# Patient Record
Sex: MALE | Race: BLACK OR AFRICAN AMERICAN | NOT HISPANIC OR LATINO | URBAN - METROPOLITAN AREA
[De-identification: names, ages, dates, MRNs, and addresses within clinical notes are randomized per-mention and may not be internally consistent; named-entity substitution may affect disease eponyms.]

---

## 2017-10-19 ENCOUNTER — HOSPITAL ENCOUNTER (OUTPATIENT)
Dept: INTENSIVE CARE | Facility: HOSPITAL | Age: 77
End: 2017-10-20
Attending: INTERNAL MEDICINE | Admitting: INTERNAL MEDICINE

## 2022-04-29 NOTE — DISCHARGE SUMMARY
DISCHARGE DATE:  10/20/2017    FINAL DIAGNOSES:    1. Atypical chest pain.  2. Hypertension.  3. Hypercholesterolemia.  4. Anemia.  5. Diabetes mellitus type 2.  6. Thrombocytopenia.    HOSPITAL COURSE:  Mr. Morgan is a 76-year-old  male who was admitted because of chest pain lasting about approximately one-half an hour off and on.  At this point, we did troponin levels on the patient, which has been basically negative on two occasions.  The EKG revealed no acute changes and we consulted cardiology.  The cardiologist saw the patient and thinks that he can work with the patient as an outpatient to do a nuclear stress test as an outpatient.  Since in the hospital, he has been fairly stable.  He is not complaining of any chest pain.  He seems to have responded to all the treatment, cooperative and patient, at this point in time, is ready to be discharged.    FINAL DISPOSITION:  Discharge to home in satisfactory physical condition with the following recommendation to see me in my office in two weeks and to follow up also with the cardiologist.        ______________________________  MD ROBYN Paz/PL  DD:  10/20/2017  Time:  04:42PM  DT:  10/21/2017  Time:  11:06AM  Job #:  [id number]

## 2022-04-29 NOTE — CONSULTS
DATE OF CONSULTATION:  10/19/2017    ATTENDING PHYSICIAN:  Dr. Abdulaziz Almazan MD  CONSULTING PHYSICIAN:  Emile Hopkins MD    REASON FOR CONSULTATION:  This consultation was requested by Dr. Almazan on 10/19/17.  The patient was seen and examined by me on 10/19/17.  Reason for consultation is evaluation of chest pain and shortness of breath for cardiovascular evaluation and management.    HISTORY OF PRESENT ILLNESS:  The patient is a very pleasant 76-year-old male patient well known to me with the following past medical history:  1. Hypertension, hyperlipidemia, diabetes mellitus and mildly elevated body mass index.  2. History of coronary artery disease.  The patient has had previous stents and also has had some symptoms suggestive of spasms in the past.  He also has very significant small vessel disease.  Last heart catheterization was 6/16/16.  At that time, he had diffuse small vessel in the mid and distal LAD, which was 1 mm in the mid LAD and 0.5 mm in the distal LAD.  This was to be treated medically. The patient is on high dose ______, as well as Ranexa.  At that time, the other arteries were okay.  There was 30 to 40% in the diagonal and 30% in small high OM and small OM2, and in the distal circumflex artery and in 30 to 40% in the right coronary artery, PD with small vessel disease.  3. Hypertensive heart disease with left ventricular hypertrophy and left ventricular diastolic dysfunction.  4. Moderate severe left ventricular hypertrophy, diastolic dysfunction and he has moderate left atrial enlargement.  He is on hydrochlorothiazide as part of lisinopril/hydrochlorothiazide, which his used also has a diuretic to help with shortness of breath.  He may have a mild component of mild chronic diastolic heart failure.  5. History of valvular heart disease with mild to moderate mitral regurgitation, mild tricuspid regurgitation and mild to moderate pulmonary hypertension.    6. History of iron deficiency  anemia.  The patient has had previous admissions where iron level was as low as 8.  Today, it is around 12, which is still mildly anemic, but much better than previous admissions.  The patient does receive IV iron by the hematology service and is due for an effusion soon.  7. History of gastritis.  8. History of obstructive sleep apnea.  The patient uses a CPAP.  9. History of slow GI bleed.  The patient needed 4 units of blood for hemoglobin of 7 in 11/16.    10. History of appendectomy, cataract extraction OU, colonoscopy, _____ stents as detailed.     Mr. Morgan has the history as detailed above, who presented to the hospital because of chest discomfort, as well as shortness of breath.  He has noted that around two to three weeks, the patient had ______ which she takes daily, and Effient which she takes two days a week for about one week to undergo teeth extraction for some bad teeth.  He got back on the medication after that.  The patient does report the discomfort is in the mid chest area, it is associated with some shortness of breath.  No nausea, vomiting or sweating.  These episodes have been intermittent.  They had occurred with exertion, as well as at rest at times.  Cardiac enzymes have so far been negative.  Alkaline phosphatase was mildly elevated, which is higher than all previous admission.  The patient does report he still has is gallbladder.  He does not know of any history of gallstones.  Amylase and lipase were okay.  The patient does not report any specific relationship with this discomfort with eating.  The patient does admit that he feels weak when he stands and walks.  He feels like his legs become like jelly and he feels weakness in the legs and some discomfort in the legs and feels like falling.  Blood pressure has been relatively in the low normal side lately.  I feel that it may have been too low and maybe he is getting some orthostasis.  I will decrease some of his medications.  Some of  his symptoms seem to be suggestive of this.       I discussed with him the different options regarding his coronary artery disease in this pattern of symptoms.  We discussed if does show any evidence of _____, such has _____ cardiac enzymes or ongoing dynamic changes in EKG or ongoing symptoms, we may consider an angiogram. However, if no high risk features develop, we may adjust his medications and consider doing some outpatient ischemia evaluation.  Last stress test was done two years ago, and his last angiogram was around 1-1/2 years ago.  The patient, however, given his alkaline phosphatase will check him out also for some gallbladder pathology with right upper quadrant ultrasound.  Given his orthostatic symptoms, I am going to discontinue hydralazine and discontinue Diltiazem as the isosorbide dose is quite high and should sufficing to take care of his spasms.  I will change Coreg from 3.5 to 5 b.i.d. to three times a day given his extensive small vessel disease.  I will also check D-dimer, and if positive will obtain CTA of chest and venous upper and lower extremities.    PAST MEDICAL HISTORY:  As above.    PAST SURGICAL HISTORY:  As above.    REVIEW OF SYSTEMS:  Complete comprehensive 12-system review of systems was obtained.  It is all negative, except for what is mentioned below and what is mentioned above in history of present illness.  I am not listing all of those 12 systems as I obtained comprehensively and listing some of them here.   GENERAL:   Generalized weakness and fatigue.   PULMONARY:  No cough or pleuritic chest pain.   CARDIOVASCULAR:  Dyspnea on exertion and chest discomfort, weakness in legs with some discomfort in legs with walker.   GI:  No hematochezia, melena or hematemesis.  History of GI bleeding.  History of chronic slow oozing with anemia and receives IV iron.   :  No dysuria, frequency or urgency.   MUSCULOSKELETAL:  Nonspecific aches and pains.    SOCIAL HISTORY:  Denies any  alcohol use.  Denies any tobacco use.  He is  with four children.  He lives in [place].  He is retired.  He used to work at the [employer].    FAMILY HISTORY:  Positive for coronary artery disease in father and mother.    MEDICATIONS:  Reviewed. I performed his MedRec for all medications since I was the first physician to come and see him to make sure he gets his medications.    ALLERGIES:  REVIEWED.    PHYSICAL EXAMINATION:  VITAL SIGNS:  Pulse 50s, respirations 18, pulse ox 96% on room air, blood pressure 138/83.  Despite the fact that he has not taken his medications since morning, blood pressure was only 144/85, thus supporting the possibility with may have dropped blood pressure too low contributing to orthostasis.     GENERAL:   Well-developed, well-nourished, in no acute distress.     HEENT:   Extraocular movements are intact.     NECK:  Supple.  No lymphadenopathy.  No appreciable jugular venous distention.  soft carotid bruit.   LUNGS:  Clear to auscultation bilaterally.   HEART:  Regular rate and rhythm.  No rubs.  Soft S4.  He has a holosystolic murmur, grade 1 to 2/6 at the apex on lateral sternal border.   ABDOMEN:  Soft, nontender and nondistended with normal active bowel sounds.  No Hooper's sign.   EXTREMITIES:  No clubbing, cyanosis or edema. Mildly decreased pulses in feet.    LABORATORY DATA:  Sodium 137, potassium 4.5, chloride 104, CO2 22, BUN 16, creatinine 1.2, glucose 199.  Alkaline phosphatase is elevated at 184.  On previous admission, 56, 62 and 69, so this is definitely a sudden increase.  I do not see any D-dimer ordered.  We will order that.  I do not see any BNP ordered, I will order that.  Lipase was 277, within normal limits.  Three sets of cardiac enzymes are so far negative.  White blood cell count 3.0, hemoglobin mildly decreased at 12.2, although on previous admissions, he was 10.5 and one time down to 6.7, platelet count 260.       Urinalysis was no suggestive of a  urinary tract infection.    ASSESSMENT/PLAN:    1. Chest pain appears to be multifactorial, partly could be angina.  We need to also rule out the possibility of pulmonary embolism.  We will check a screening D-dimer.  It could be related to his gallbladder given his alkaline phosphatase.  We will rule him out for myocardial infarction with serial EKG and enzymes.  I told the patient if any high risk features developed, then we will consider an angiogram.  However, if the cardiac enzymes are negative, and he continues to be chest pain free and no features develop on EKG, then we will proceed with adjustment with the medications and with outpatient ischemia evaluation.  This was the patient's wishes and we will respect that.  In the meantime, we will adjust his medications given his orthostasis.  I will go ahead and discontinue Diltiazem as the isosorbide should be taking care of his possibility of coronary vasospasm.  I will increase Coreg to 3.25 three times a day to take care of his significant small vessel disease in the mid and distal LAD.  I feel that she can better control with exertion of symptoms. I am not afraid of it causing any spasm.  It was just suspected because of his recurrent symptoms, although those could be coming from small vessel disease as well.  Again the possibility of him coming off of aspirin and Effient for one week to undergo his teeth extraction, may have ______ some pathology as well in the coronary arteries.  However, there is no evidence of any acute coronary syndrome at this stage yet.  2. Angina pectoris, as detailed above.  3. Elevated alkaline phosphatase, as detailed above.  Will obtain right upper quadrant ultrasound to make sure there is no gallbladder pathology.  This is a new findings in the labs for the patient.  4. Will check D-dimer and if it is positive, will obtain CTA of chest and venous Doppler of lower extremities.  5. Orthostasis.  I will discontinue hydralazine.  I  will discontinue Diltiazem.  I will change Coreg from 3.1 to 5 b.i.d. to three times a day.  Will monitor blood pressure and check on systolic in the morning.  I will discontinue lisinopril hydrochlorothiazide and will put him on lisinopril 5 mg, and instead of the hydrochlorothiazide for diuresis, we will use Lasix 10 mg daily.  That will achieve the amount of diuresis needed without dropping his blood pressure further.  6. History of hypertensive heart disease with left ventricular hypertrophy and diastolic dysfunction, possibility of a history of mild chronic diastolic heart failure whereby he had been on hydrochlorothiazide to achieve some amount of diuresis to prevent swelling in the legs and shortness of breath.  Will check BMP.  Will use Lasix instead of hydrochlorothiazide to avoid dropping blood pressure.  7. Valvular heart disease with mitral regurgitation, tricuspid regurgitation and pulmonary hypertension.    8. Mild anemia, much better than previous admission.  The patient is on chronic IV iron, he has slow GI bleed.  However, nothing to do interventionally, that is why he is on the chronic iron.  Effient has been decreased to two days a week because of that.  9. Diabetes, I will cover the patient with insulin sliding scale.  10.   Hypertension.  Will adjust medications as detailed above.  11. Hyperlipidemia.  Will resume the patient's statin.        ______________________________  MD YUDITH Mazariegos/SIMEON  DD:  10/19/2017  Time:  08:29PM  DT:  10/20/2017  Time:  07:19AM  Job #:  [id number]    cc: Dr. Abdulaziz Almazan MD

## 2022-04-29 NOTE — ED PROVIDER NOTES
"   Patient:   Alex Morgan             MRN: [id number]            FIN: [id number]               Age:   76 years     Sex:  Male     :  1940   Associated Diagnoses:   Acute chest pain; History of myocardial infarction   Author:   Malena Phelps MD      Basic Information   Time seen: Date & time 10/19/2017 09:48:00.   History source: Patient.   History limitation: None.   Additional information: Patient's physician(s): Sandeep PERERA, Emile JUÁREZ, Tha PERERA, Abdulaziz SENIOR, Chief Complaint from Nursing Triage Note : Chief Complaint   10/19/2017 9:43 CDT      Chief Complaint           pt c/o SOB and midline CP that started at 0900; reports pain is "tight", took 3 nitro PTA, CP intermettent x days with +weakness; hx of stents, MI, anemia; +effient  , Room #29.      History of Present Illness   The patient presents with 75 yo M who presents with CC of chest pain, pt took nitro.  Hx of anemia.  A michael pac  and       I, Dr. Phelps, assumed care for this patient at 1133.    76 year old black male with a hx of DM, HLD, and anemia presents to the ED complaining of intermittent centralized chest tightness with associated weakness and SOB onset 1 week ago. Patient states he was at Dr. Almazan' office at 0850 this morning when his symptoms began to worsen and he was told to come to the ED. He reports taking 3 NTG this morning and an ASA when he arrived at the ED and is currently pain free. He does not recall the date of his last angiogram. Patient denies n/v or diaphoresis.  The onset was 1  weeks ago.  The course/duration of symptoms is fluctuating in intensity.  Location: Central chest. Radiating pain: none. The character of symptoms is tightness.  The degree at onset was moderate.  The degree at maximum was moderate.  The degree at present is none.  The exacerbating factor is none.  The relieving factor is nitroglycerin.  Risk factors consist of diabetes mellitus and hyperlipidemia.  Prior episodes: none.  Therapy today " Nitroglycerin and Aspirin.  Associated symptoms: shortness of breath, denies nausea, denies vomiting and denies diaphoresis.        Review of Systems   Constitutional symptoms:  Weakness, no fever, no chills.    Skin symptoms:  No jaundice, no rash.    Eye symptoms:  Vision unchanged, No blurred vision,    Respiratory symptoms:  Shortness of breath, no orthopnea, no cough, no sputum production.    Cardiovascular symptoms:  Chest pain, central, moderate pain, tightness, no palpitations, no diaphoresis, no peripheral edema.    Gastrointestinal symptoms:  No abdominal pain, no nausea, no vomiting, no diarrhea, no constipation.    Genitourinary symptoms:  No dysuria, no hematuria.    Neurologic symptoms:  No headache, no dizziness.    Hematologic/Lymphatic symptoms:  Bleeding tendency negative,    Allergy/immunologic symptoms:  No impaired immunity,              Additional review of systems information: All other systems reviewed and otherwise negative.      Health Status   Allergies:    Allergic Reactions (Selected)  No Known Allergies.   Medications:  (Selected)   Prescriptions  Prescribed  Imdur 120 mg oral tablet, extended release: 120 mg = 1 tab(s), Oral, At Bedtime, # 30 tab(s), 6 Refill(s)  ferrous sulfate 220 mg/5 mL (44 mg elemental iron) oral elixir: 220 mg = 5 mL, Oral, Daily, # 480 mL, 2 Refill(s), Pharmacy: [facility]  Documented Medications  Documented  B-12 Resin 1000 mcg oral tablet: 1,000 mcg = 1 tab(s), Oral, Daily, # 90 tab(s), 0 Refill(s)  COMBIGAN SOL 0.2/0.5%: 1 drop(s), Eye-Both, q12hr  Effient 10 mg oral tablet: See Instructions, 1 tab(s) Oral Daily, 0 Refill(s)  GLIPIZIDE TAB 5MG: 10 mg = 2 tab(s), Oral, BID  MAGNESIUM OXIDE 400 MG TABLET: 400 mg, 1 tab(s) Oral bid  NITROSTAT SUB 0.4M.4 mg = 1 tab(s), SL, q5min  PANTOPRAZOLE SOD DR 40 MG TAB: 40 mg = 1 tab(s), Oral, Daily  RANITIDINE TAB 150M mg = 1 tab(s), Oral, BID  Ranexa 1000 mg oral tablet, extended release: 1,000 mg = 1  tab(s), Oral, BID, # 60 tab(s), 0 Refill(s)  SIMVASTATIN TAB 40M mg = 1 tab(s), Oral, Once a day (at bedtime)  carvedilol 3.125 mg oral tablet: 3.125 mg = 1 tab(s), Oral, BID, # 180 tab(s), 0 Refill(s)  diltiazem 120 mg/24 hours oral CAPsule extended release: 120 mg = 1 cap(s), Oral, Daily, # 30 cap(s), 0 Refill(s)  hydrALAZINE 10 mg oral tablet: 10 mg = 1 tab(s), Oral, TID, # 90 tab(s), 0 Refill(s)  hydrochlorothiazide-lisinopril 12.5 mg-10 mg oral tablet: 1/2 tab(s), Oral, Daily, 0 Refill(s).      Past Medical/ Family/ Social History   Medical history:    Active  Anemia ([id number])  Diabetes mellitus type II (12384373)  Hyperlipidemia (94584874)  Resolved  Angina (721816861):  Resolved.  Cataracts (5110821679):  Resolved.  CAD - Coronary artery disease (4906603643):  Resolved.  HTN (hypertension) ([id number]):  Resolved.  Heartburn ([id number]):  Resolved.  History of stomach ulcers ([id number]):  Resolved..   Surgical history:    stent placement.  appenedectomy.  Angiogram (600524370)..   Family history:    Hypertension  Father  Diabetes mellitus type 2  Father  Brother  Sister  CHF (congestive heart failure)...  Father  Pacemaker...  Mother  .      Physical Examination               Vital Signs   Vital Signs   10/19/2017 11:33 CDT     Peripheral Pulse Rate     55 bpm  LOW                             Heart Rate Monitored      55 bpm  LOW                             Respiratory Rate          23 br/min                             SpO2                      98 %                             Systolic Blood Pressure   146 mmHg  HI                             Diastolic Blood Pressure  79 mmHg                             Mean Arterial Pressure, Cuff              101 mmHg    10/19/2017 11:01 CDT     Peripheral Pulse Rate     56 bpm  LOW                             Heart Rate Monitored      55 bpm  LOW                             Respiratory  Rate          16 br/min                             SpO2                      99 %                             Oxygen Therapy            Room air                             Systolic Blood Pressure   136 mmHg                             Diastolic Blood Pressure  80 mmHg                             Mean Arterial Pressure, Cuff              99 mmHg    10/19/2017 10:15 CDT     Peripheral Pulse Rate     55 bpm  LOW                             Heart Rate Monitored      55 bpm  LOW                             Respiratory Rate          19 br/min                             SpO2                      100 %                             Oxygen Therapy            Room air                             Systolic Blood Pressure   152 mmHg  HI                             Diastolic Blood Pressure  79 mmHg                             Mean Arterial Pressure, Cuff              103 mmHg    10/19/2017 9:43 CDT      Temperature Oral          36.4 DegC                             Peripheral Pulse Rate     54 bpm  LOW                             Respiratory Rate          18 br/min                             SpO2                      96 %                             Oxygen Therapy            Room air                             Systolic Blood Pressure   138 mmHg                             Diastolic Blood Pressure  83 mmHg  .   Measurements   10/19/2017 9:43 CDT      Weight Dosing             88.5 kg                             Weight Measured and Calculated in Lbs     195.11 lb                             Weight Estimated          88.5 kg                             Height/Length Dosing      173 cm                             Height/Length Estimated   173 cm                             Body Mass Index Estimated 29.57 kg/m2  .   Basic Oxygen Information   10/19/2017 11:33 CDT     SpO2                      98 %    10/19/2017 11:01 CDT     SpO2                      99 %                             Oxygen Therapy            Room air    10/19/2017  10:15 CDT     SpO2                      100 %                             Oxygen Therapy            Room air    10/19/2017 9:43 CDT      SpO2                      96 %                             Oxygen Therapy            Room air  .   General:  Alert, no acute distress.    Skin:  Warm, dry.    Head:  Normocephalic, atraumatic.    Neck:  Supple, trachea midline.    Eye:  Normal conjunctiva.   Ears, nose, mouth and throat:  Oral mucosa moist.   Cardiovascular:  Regular rate and rhythm, No murmur, Normal peripheral perfusion, No edema.    Respiratory:  Lungs are clear to auscultation, respirations are non-labored.    Gastrointestinal:  Soft, Nontender, Non distended, Normal bowel sounds.    Neurological:  Alert and oriented to person, place, time, and situation.   Psychiatric:  Cooperative.      Medical Decision Making   Documents reviewed:  Emergency department nurses' notes.   Orders  Laboratory    CBC w/ Auto Diff, Phuong Albarran PA-C, 10/19/17, 09:49, Completed    CMP, Phuong Albarran PA-C, 10/19/17, 09:49, Completed    Urinalysis Complete a reflex to culture, Phuong Albarran PA-C, 10/19/17, 09:49, Completed    Troponin-I, Malena Phelps MD, 10/19/17, 10:11, Completed    Lipase Level, Malena Phelps MD, 10/19/17, 10:11, Completed    POC Istat ER Troponin, ER, Physician, 10/19/17, 10:40, Completed    Estimated Glomerular Filtration Rate, Phuong Albarran PA-C, 10/19/17, 11:19, Completed    POC CBG, ER, Physician, 10/19/17, 12:10, Completed    POC iSTAT ER Troponin request, Phuong Albarran PA-C, 10/19/17, 09:49, Ordered    Manual Diff, Phuong Albarran PA-C, 10/19/17, 10:53, Ordered  Xray    XR Chest 2 Views, Phuong Albarran PA-C, 10/19/17, 09:49, Completed .   Electrocardiogram:  Time 10/19/2017 09:45:00, rate 57, No ST-T changes, no ectopy, EP Interp, The Rhythm is sinus bradycardia.  , QT interval Lateral Q waves, QRS interval Right bundle branch block, Previous EKG available No changes, compared with 10/23/2015  04:30:00.    Electrocardiogram:  Time 10/19/2017 13:49:00, rate 56, The Rhythm is sinus bradycardia.  , QRS interval Right bundle branch block, Previous EKG available No changes, compared with 10/19/2017 09:45:00, Interpretation by Emergency Physician No significant interval changes.    Results review:  Lab results : Lab View   10/19/2017 13:47 CDT     Troponin-I                0.09 ng/mL    10/19/2017 10:35 CDT     Lipase Lvl                277 unit/L                             Troponin-I                0.08 ng/mL    10/19/2017 10:28 CDT     POC Troponin              0.00 ng/mL    10/19/2017 10:17 CDT     Sodium Lvl                137 mmol/L                             Potassium Lvl             4.5 mmol/L                             Chloride                  104 mmol/L                             CO2                       22.0 mmol/L                             Calcium Lvl               8.8 mg/dL                             Glucose Lvl               199 mg/dL  HI                             BUN                       16.0 mg/dL                             Creatinine                1.20 mg/dL                             eGFR-AA                   >60 mL/min/1.73 m2  NA                             eGFR-JAIME                  >60 mL/min/1.73 m2  NA                             Bili Total                0.4 mg/dL                             Bili Direct               0.10 mg/dL                             Bili Indirect             0.30 mg/dL                             AST                       27 unit/L                             ALT                       26 unit/L                             Alk Phos                  184 unit/L  HI                             Total Protein             7.4 gm/dL                             Albumin Lvl               4.10 gm/dL                             Globulin                  3.30 gm/dL                             A/G Ratio                 1.2  NA                             WBC                        3.0 x10(3)/mcL  LOW                             RBC                       3.77 x10(6)/mcL  LOW                             Hgb                       12.2 gm/dL  LOW                             Hct                       36.7 %  LOW                             Platelet                  260 x10(3)/mcL                             MCV                       97.3 fL  HI                             MCH                       32.4 pg  HI                             MCHC                      33.2 gm/dL                             RDW                       14.5 %                             MPV                       9.5 fL                             Abs Neut                  1.59 x10(3)/mcL  LOW                             Neut Man                  62 %                             Lymph Man                 15 %                             Monocyte Man              9 %                             Eos Man                   5 %                             Basophil Man              1 %                             Abn Lymph Man             7 %  HI                             Platelet Est              Normal                             Anisocyte                 1  NA                             Poik                      1  NA                             [place] Cells                1    10/19/2017 9:39 CDT      UA Appear                 CLOUDY                             UA Color                  YELLOW                             UA Spec Grav              1.019                             UA Bili                   1+                             UA pH                     6.0                             UA Urobilinogen           0.2                             UA Blood                  Negative                             UA Glucose                1+                             UA Ketones                Negative                             UA Protein                Negative                             UA Nitrite                 Negative                             UA Leuk Est               Negative                             UA WBC                    NONE SEEN /HPF                             UA RBC                    NONE SEEN                             UA Bacteria               NONE SEEN /HPF                             UA Squam Epithelial       NONE SEEN  , Lab results : Lab View   10/19/2017 13:47 CDT     Troponin-I                0.09 ng/mL  .    Chest X-Ray:  Time reported 10/19/2017 10:28:00,     * Final Report *    Reason For Exam  Chest Pain    Radiology Report     EXAM: XR Chest 2 Views     INDICATION: Chest Pain     TECHNIQUE: Frontal and lateral views of the chest are obtained.     COMPARISON: 10/22/2015 and CT thorax on 12/2/2016     FINDINGS: The cardiomediastinal silhouette is normal in size and  contour. The thoracic aorta is mildly atherosclerotic. Mild elevation  of the left hemidiaphragm is unchanged. There is no focal  consolidation, pleural effusion or pneumothorax. Degenerative changes  are noted along the spine, with multilevel ventral bridging  osteophytes, suggestive of diffuse idiopathic skeletal hyperostosis.        IMPRESSION:   No acute cardiopulmonary process.       Signature Line  Electronically Signed By: Jaclyn Crisostomo DO  Date/Time Signed: 10/19/2017 10:28  .       Reexamination/ Reevaluation   Vital signs   results included from flowsheet : Vital Signs   10/19/2017 16:30 CDT     Peripheral Pulse Rate     62 bpm                             Heart Rate Monitored      63 bpm                             Respiratory Rate          21 br/min                             SpO2                      98 %                             Oxygen Therapy            Room air                             Systolic Blood Pressure   142 mmHg  HI                             Diastolic Blood Pressure  79 mmHg                             Mean Arterial Pressure, Cuff              100 mmHg    10/19/2017 13:29 CDT      Peripheral Pulse Rate     60 bpm                             Heart Rate Monitored      60 bpm                             Respiratory Rate          20 br/min                             SpO2                      98 %                             Oxygen Therapy            Room air    10/19/2017 12:30 CDT     Peripheral Pulse Rate     57 bpm  LOW                             Heart Rate Monitored      57 bpm  LOW                             Respiratory Rate          19 br/min                             SpO2                      100 %                             Oxygen Therapy            Room air                             Systolic Blood Pressure   133 mmHg                             Diastolic Blood Pressure  84 mmHg                             Mean Arterial Pressure, Cuff              100 mmHg    10/19/2017 11:33 CDT     Peripheral Pulse Rate     55 bpm  LOW                             Heart Rate Monitored      55 bpm  LOW                             Respiratory Rate          23 br/min                             SpO2                      98 %                             Systolic Blood Pressure   146 mmHg  HI                             Diastolic Blood Pressure  79 mmHg                             Mean Arterial Pressure, Cuff              101 mmHg     Course: improving.   Notes: Patient's chest pain still intermittently occurring, lasts only seconds to minutes. ECG on arrival and after 3 hour interval w/ no acute abnormalities. Labs notable for minimally elevated troponin but still within the normal range, no change on interval measurement. Remainder of labsa nd CXR w/ no acute abnormalities. Discussed iwnaveed guy, given hx CAD w/ prior MI, ongoing episodic chest pain, will admit for further observation, serial cardiac enzymes while in house to r/o ACS. Consideration of inpatient cardiology consultation. Findings and plan discussed with the patient, and he is agreeable to admission at this time.   .      Impression  and Plan   Diagnosis   Acute chest pain (KRO72-IZ R07.9)   History of myocardial infarction (WSS28-HV I25.2)   Plan   Disposition: Admit time  10/19/2017 15:20:00, Place in Observation Telemetry Unit, Abdulaziz Almazan MD, Reason for delay: observation.    Counseled: Patient, Regarding diagnosis, Regarding diagnostic results, Regarding treatment plan, Patient indicated understanding of instructions.    Notes: IGonzalo, acted solely as a scribe for and in the presence of Dr. Phelps who performed the service., I, Malena Phelps, have independently performed the history, physical, medical decision making and procedures as documented above and agree with the scribe's documentation. .

## 2022-04-29 NOTE — H&P
CHIEF COMPLAINT:  Chest pain.    HISTORY OF PRESENT ILLNESS:  Mr. Johnson is a 76-year-old  male states that since over the weekend he started to experience chest pain lasting 1/2 hour or less, however, the pain continued to be on the a frequent basis off and on and he came to the Emergency Room for evaluation and treatment.  I was notified due the patient's history and due to at that troponin appeared at that time to be somewhat elevated.  We admitted the patient and consulted the cardiologist.    PAST MEDICAL HISTORY:  Significant for diabetes mellitus, pulmonary artery disease, anemia and hypercholesterolemia.    SOCIAL HISTORY:  Patient is , has two boys and one girl.  Completed 12th grade at school.  He doesn't drink, doesn't smoke, denies IV drug abuse.    FAMILY HISTORY:  Significant for hypertension, heart problems, diabetes mellitus.  No cancer in the family.    ALLERGIES:  No known allergies    CURRENT MEDICATIONS:  Patient is taking ranitidine 150 mg twice a day, isosorbide mono 120 mg once a day, diltiazem 10 mg b.i.d., hydralazine 10 mg t.i.d., taking also Coreg 2.125 mg twice a day, aspirin 325 mg once a day, lisinopril 2.5 mg b.i.d., ferrous sulfate 220 once a day, he is on glipizide 10 mg b.i.d..    REVIEW OF SYSTEMS:  CARDIOVASCULAR:  Negative for chest pain. Cardiologist describes the patient has frequent chest pain lasting approximately 1/2 hour.  RESPIRATORY: Negative for shortness of breath.  GASTROINTESTINAL:  No diarrhea or vomiting.  ENDOCRINE:  Patient has diabetes mellitus type II.  NEUROLOGIC:  Negative.  PSYCHIATRIC:  Negative.    PHYSICAL EXAMINATION:  VITAL SIGNS:  At the time the patient was seen, blood pressure 128/69, pulse 59, respirations 16, temperature 36.6.     HEAD:  Normocephalic with no acute trauma to the head.       EYES:  Pupils react to light and accommodation, seem to be satisfactory.       EARS, NOSE, AND THROAT:  No current pathology.      NECK:  Supple.  No JVD or adenopathy.     HEART:  S1, S2.  No significant murmur or gallop.     CHEST:  Appeared to be clear.  No wheezing or rales.     ABDOMEN:  Soft, nontender, good bowel sounds.     EXTREMITIES:  Superior and inferior, fairly good range of motion.     NEUROLOGICAL:  No neurological deficit.  Cranial nerves 2-12 appear to be intact.    LABORATORY:  Patient has basically a troponin level of 0.08, that keeps increasing slightly, today 0.14.  Hemoglobin 11.7, hematocrit 36.8, white blood cell count 2.2.       I ordered a chest x-ray for this patient which showed that she had no acute cardiopulmonary process.    IMPRESSION:    1. Atypical chest pain.  2. History of coronary artery disease.  3. Hypercholesterolemia.  4. Hypertension.  5. Anemia.  6. Diabetes mellitus type II.  7. Thrombocytopenia.      PLAN:  We will wait for cardiologist decision.  If his troponin continues to be elevated the patient will need a left heart catheterization.        ______________________________  MD ROBYN Paz/JOSE ANTONIO  DD:  10/20/2017  Time:  08:16AM  DT:  10/20/2017  Time:  09:51AM  Job #:  [id number]    The H&P was reviewed, the patient was examined, and the following changes to the patients condition are noted:  ______________________________________________________________________________  ______________________________________________________________________________  ______________________________________________________________________________  [  ] No changes to the patient's condition:      ______________________________                                             ___________________  PHYSICIAN SIGNATURE                                                             DATE/TIME

## 2022-05-10 ENCOUNTER — OFFICE VISIT (OUTPATIENT)
Dept: HEMATOLOGY/ONCOLOGY | Facility: CLINIC | Age: 82
End: 2022-05-10
Payer: MEDICARE

## 2022-05-10 VITALS — BODY MASS INDEX: 31.34 KG/M2 | WEIGHT: 195 LBS | HEIGHT: 66 IN

## 2022-05-10 DIAGNOSIS — D50.0 IRON DEFICIENCY ANEMIA DUE TO CHRONIC BLOOD LOSS: Primary | ICD-10-CM

## 2022-05-10 PROCEDURE — 99442 PR PHYSICIAN TELEPHONE EVALUATION 11-20 MIN: CPT | Mod: 95,,, | Performed by: INTERNAL MEDICINE

## 2022-05-10 PROCEDURE — 99442 PR PHYSICIAN TELEPHONE EVALUATION 11-20 MIN: ICD-10-PCS | Mod: 95,,, | Performed by: INTERNAL MEDICINE

## 2022-05-10 RX ORDER — FUROSEMIDE 20 MG/1
TABLET ORAL
COMMUNITY
Start: 2022-02-07

## 2022-05-10 RX ORDER — FERROUS SULFATE TAB 325 MG (65 MG ELEMENTAL FE) 325 (65 FE) MG
325 TAB ORAL 3 TIMES DAILY
Qty: 90 TABLET | Refills: 11 | Status: SHIPPED | OUTPATIENT
Start: 2022-05-10 | End: 2022-06-09

## 2022-05-10 RX ORDER — LISINOPRIL 20 MG/1
20 TABLET ORAL
COMMUNITY
Start: 2022-01-24

## 2022-05-10 RX ORDER — TRIAMCINOLONE ACETONIDE 0.25 MG/ML
LOTION TOPICAL
COMMUNITY
Start: 2022-01-29

## 2022-05-10 RX ORDER — METFORMIN HYDROCHLORIDE 500 MG/1
TABLET, EXTENDED RELEASE ORAL
COMMUNITY
Start: 2022-03-17

## 2022-05-10 RX ORDER — GLIPIZIDE 10 MG/1
10 TABLET ORAL 2 TIMES DAILY
COMMUNITY
Start: 2022-04-03

## 2022-05-10 RX ORDER — AMLODIPINE BESYLATE 5 MG/1
TABLET ORAL
COMMUNITY
Start: 2022-04-12

## 2022-05-10 RX ORDER — CARVEDILOL 6.25 MG/1
TABLET ORAL
COMMUNITY
Start: 2022-02-11

## 2022-05-10 RX ORDER — ISOSORBIDE MONONITRATE 60 MG/1
120 TABLET, EXTENDED RELEASE ORAL NIGHTLY
COMMUNITY
Start: 2022-04-12

## 2022-05-10 RX ORDER — ALBUTEROL SULFATE 90 UG/1
1 AEROSOL, METERED RESPIRATORY (INHALATION) 2 TIMES DAILY
COMMUNITY
Start: 2022-03-24

## 2022-05-10 RX ORDER — CLONIDINE HYDROCHLORIDE 0.1 MG/1
TABLET ORAL
COMMUNITY
Start: 2022-02-15

## 2022-05-10 RX ORDER — ROSUVASTATIN CALCIUM 20 MG/1
20 TABLET, COATED ORAL NIGHTLY
COMMUNITY
Start: 2022-05-03

## 2022-05-10 RX ORDER — LANOLIN ALCOHOL/MO/W.PET/CERES
1 CREAM (GRAM) TOPICAL 2 TIMES DAILY
COMMUNITY
Start: 2022-05-03

## 2022-05-10 RX ORDER — SPIRONOLACTONE 25 MG/1
TABLET ORAL
COMMUNITY
Start: 2022-02-07

## 2022-05-10 RX ORDER — BRIMONIDINE TARTRATE, TIMOLOL MALEATE 2; 5 MG/ML; MG/ML
SOLUTION/ DROPS OPHTHALMIC
COMMUNITY
Start: 2022-02-15

## 2022-05-10 RX ORDER — FERROUS SULFATE TAB 325 MG (65 MG ELEMENTAL FE) 325 (65 FE) MG
TAB ORAL 2 TIMES DAILY
COMMUNITY
Start: 2022-04-01 | End: 2022-05-10 | Stop reason: SDUPTHER

## 2022-05-10 NOTE — PROGRESS NOTES
Established Patient - Audio Only Telehealth Visit     The patient location is: home  The chief complaint leading to consultation is: iron deficiency anemia due to blood loss and thrombocytopenia  Visit type: Virtual visit with audio only (telephone)  Total time spent with patient: 15 minutes     The reason for the audio only service rather than synchronous audio and video virtual visit was related to technical difficulties or patient preference/necessity.     Each patient to whom I provide medical services by telemedicine is:  (1) informed of the relationship between the physician and patient and the respective role of any other health care provider with respect to management of the patient; and (2) notified that they may decline to receive medical services by telemedicine and may withdraw from such care at any time. Patient verbally consented to receive this service via voice-only telephone call.    PCP: Dr. 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 Lost to follow up    Has previously received multiple courses of IV iron. Multiple PRBC transfusions.    In 6/2019 he had EGD with Dr Fajardo that showed hiatal hernia, schatzki's ring, esophagitis, gastric ulcer and gastritis.    Most recently presented to [facility] 8/2019 with CP and found to have severe anemia with Hg of 3.2, FOBT+. He was admitted and received a total of 6u of PRBC. He had EGD with Dr Hilario and found to have a few benign sessile polyps in antrum and fundus. He also had colonoscopy with Dr Fajardo that showed colon polyp and internal and external hemorrhoids. 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  During admission he was noted to have thrombocytopenia as well w/ PLT count dropping to ~50k prior to discharge.    He was referred for outpatient capsule endoscopy which as per pt he had 9/2019, results not available.    Interval History  Today 5/10/22: Patient via telephone today for follow up visit. He is taking oral iron twice daily as directed without any issues but states he has been out for about 1 week. Denies any excessive fatigue, abnormal bleeding or bruising. Denies N/V/D/C, melena, or hematochezia. Continues to have chronic ALVES unchanged from prior. He continues to follow up with cardiology. He reports improvement in SOB with albuterol inhaler and rest. He reports compliance with BP medications.  Denies headaches or dizziness. No other complaints or concerns reported.    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93%  6/7/21 Cr 1.16, Alb 4.1, TP 7.3, iron 52, TIBC 371, Ferritin 21, Folic acid 13.76, B12 >2000, WBC 3.74, Hgb 11.9, MCV 93.2, RDW 13.7, , retic 3.34  4/12/21 Cr 1.17 Alb 4.2 TP 7.6 Iron 55 TIBC 342. Ferritin 40 Folate 12.68 B12>2000 WBC 3.72 RBC 4.56 Hg 14 MCV 92.1 RDW 13.6  Retic 120k ANC 2.06  2/18/21 Glu 239, Cr 1.24, TP 7.1 Alb 4.0, Ca 9.1 Iron 86 TIBC 322 Iron sat 27% Ferritin 35 Folic acid 17.37, B12 1204, WBC 4.08,  Hgb 13.1, , ANC  2.51, retic 2.43%  12/9/20 , CR 1.18, TP 6.5, ALB 3.7, calcium 8.6, iron 100, TIBC 295, iron sat 34%, ferritin 31, folic acid 15.54, B12 1187, WBC 3.64, Hg 12.3, , ANC 2.16, retic 1K  8/18/20 Cr 1.3 Alb 4 TP 6.9 AST 12 ALT 10 Iron 83 TIBC 295 Ferritin 50 Folate 14.91 B12 1671 WBC 4.46 Hg 12.6 RBC 4.13 MCV 95.6 RDW 14.1  Retic % 2.53 Abs retic 100k  3/25/20 Cr 1.13 iron 86 TIBC 278 Ferritin 119 Folate 16 B12 >2000 WBC 2.80 RBC 3.91 Hgb 12.2 MCV 97  retic 2.45% ANC 1.70  1/31/20 Cr 1.23 Alb 4.2 Iron 57 TIBC 285 Ferritin 158 WBC 3.31 Hg 13 RBC 4.16 MCV 97.1  Retic ~85k ANC 1.75  12/6/19 Iron 89, TIBC 288, iron sat 31% Ferritin 378, Folic acid 18.04, b12 1535, Hgb 12.4, , WBC 3.54, ANC 1.96  10/22/19 WBC 3.5 Hg 11.1 MCV 92 RDW 17.6  Ferritin 12 Iron 61 TIBC 378  9/25/19 Iron 55 TIBC 393 Ferritin 32 Folate 18.8 B12 1453  8/13/19 Hg 9.5 MCV 80.4 WBC 4.75 PLT 52k RBC 3.78  8/9/19 Hg 3.2 MCV 65 RBC 1.89 RDW 22.4 PLT 119k    Lab History:  Iron 114, ferritin 10-- Hgb 10.5-- 1/21/2014  Iron 48, ferritin 9, iron sat 12%, Hgb 9.6-- 3/25/2014  Iron 31, ferritin 6, iron sat 8%, Hgb 8.7--5/22/2014  Iron 60, ferritin 49, iron sat 20%, Hgb 12.7- TIBC  305- 7/23/2014  Iron 12, ferritin 5, iron sat 3%, Hgb 6.4 Hct 22.5 TIBC 388--12/22/2014.  Iron 11, TIBC 446, iron sat 2.5%, ferritin 4.8-- 5/28/2015  Iron 75, TIBC 317, iron sat 24%, ferritin 538-- 6/23/2015  Iron 71, TIBC 355, iron sat 20%, ferritin 17--9/03/2015  Iron 107, TIBC 285, iron sat 38%, ferritin 255-- 10/15/2015  Iron 11, TIBC 305, iron sat 4%, ferritin 6 -- 02/12/2016  Iron 126, TIBC 335, iron sat 38%, ferritin 18- 3/14/16  Retic 4%, Ferritin 18- 4/19/16  Ferritin 14, retic 3%- 5/10/16  Iron  141, TIBC 333 , Iron sat  42%, Ferritin 18,  Retic 2.66  -6/6/16  Iron 209, TIBC 360, iron sat 58%, ferritin 21 - 8/30/16  10/2016 Ferritin 14, iron 24, iron sat 7% -->Injectafer ordered 12/7/16 11/29/16  HGB 7.0--:> transfused  1/9/17  HGB 11.8, MCV 96, Ferritin 257  2/20/17 Ferritin 18, HGB 11.8  4/12/17 WBC 3.1, HGB 7.1, MCV 80, PLT 282K, Ferritin 6--> Injectafer 4/12/17 and 4/16/17 4/29/17 Ferritin 266  6/5/17 WBC 2.4, Hgb 7.4, MCV 87, , ferritin 10.--> Injectafer for 6/5/17 and 2 units packed red blood cells  7/5/17 WBC 8.1, Hgb 10.9, PLT 2:30 1K, MCV 96, ferritin 247  9/11/2017 WBC 2.8, Hgb 11.5, , ferritin 421  1/24/17 WBC 3.71, HGB 11.3, PLT 319K Ferritin 319  3/28/18 WBC 2.5, HGB 11.3, MCV 93, PLT 151K Ferritin 294    EGD 11/8/2010--diffuse hemorrhagic gastritis, dede negative  EGD 10/09/12 -- hemorrhagic gastritis  Colonoscopies done in 2004 and 2010 (2010 benign polyps found)        [facility]  EGD 6/2019 esophageal hiatal hernia, ring in GEJ, erythema in lower 3rd of esophagus, ulcer in antrum, gastritis. Normal duodenum. Repeat EGD 8/10/19 normal stomach and esophagus with few benign gastric polyps  Colonoscopy 8/13/19 with polyps, internal and external hemorrhoids       Imaging: none pertinent    Path:  8/9/19 sigmoid colon biopsy: polypoid colonic mucosa with benign lymphoid aggregate and reactive mucosal changes. no hyperplastic polyp, adenomatous change or malignancy    Review of Systems  CONSTITUTIONAL: no fevers, no chills, no weight loss, +mild chronic fatigue, no weakness  HEMATOLOGIC: no abnormal bleeding, no abnormal bruising, no drenching  night sweats  ONCOLOGIC: no new masses or lumps  HEENT: no vision loss, no tinnitus or hearing loss, no nose bleeding, no dysphagia, no odynophagia  CVS: no chest pain, no palpitations, +mild chronic dyspnea on exertion  RESP: + shortness of breath, no hemoptysis, no cough  GI: no nausea, no vomiting, no diarrhea, no constipation, no melena, no hematochezia, no hematemesis, no abdominal pain, no increase in abdominal girth  : no dysuria, no hematuria, no hesitancy, no scrotal swelling, no  discharge  INTEGUMENT: no rashes, no abnormal bruising, no nail pitting, no hyperpigmentation  NEURO: no falls, no memory loss, no paresthesias or dysesthesias, no urofecal incontinence or retention, no loss of strength on any extremity  MSK: no back pain, no new joint pain, no joint swelling  PSYCH: no suicidal or homicidal ideation, no depression, no insomnia, no anhedonia  ENDOCRINE: no heat or cold intolerance, no polyuria, no polydipsia    Physical Exam  Telephone visit, not performed    Assessment/Plan  1. Anemia due to iron deficiency D50.0  Given chronic CAD Hg should be >8/dl  MJ is due to recurrent GI blood loss.  Received IV Injectafer x 2 on 11/13/19 and 11/20/19 with appropriate response.  Ferritin remains relatively stable though somewhat lower than last visit, Hg also somewhat lower than last visit though still adequate. Retic remains elevated suggesting ongoing blood loss  C/w oral iron BID, he will attempt to increase to TID if tolerated  Referred back to GI but he wishes to hold off on referral at this time. Will continue to monitor closely  Creatinine stable  Continue to f/u with cardiology as recommended, C/w inhaler PRN SOB  Return in 4 months with CBC, CMP, Iron panel, Ferritin and retic, folate, B12  Call if any questions or concerns    2. Thrombocytopenia D69.6  Multifactorial. During hospitalization at least partially dilutional due to transfusion of 6 u of PRBC as well as consumptive due to ongoing bleeding. Also likely due to medications including lasix, PPI and h2 blockers  PLT count has now normalized    3. Hypertension I10  States good control at home, c/w BP meds as prescribed     This service was not originating from a related E/M service provided within the previous 7 days nor will  to an E/M service or procedure within the next 24 hours or my soonest available appointment.  Prevailing standard of care was able to be met in this audio-only visit.

## 2022-09-08 PROBLEM — D50.0 IRON DEFICIENCY ANEMIA DUE TO CHRONIC BLOOD LOSS: Status: ACTIVE | Noted: 2022-09-08

## 2022-09-08 PROBLEM — D69.6 THROMBOCYTOPENIA: Status: ACTIVE | Noted: 2022-09-08

## 2022-09-08 PROBLEM — I10 HYPERTENSION: Status: ACTIVE | Noted: 2022-09-08

## 2022-09-08 NOTE — PROGRESS NOTES
PCP: Dr. 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 Lost to follow up    Has previously received multiple courses of IV iron. Multiple PRBC transfusions.    In 6/2019 he had EGD with Dr Fajardo that showed hiatal hernia, schatzki's ring, esophagitis, gastric ulcer and gastritis.    Most recently [place] [facility] [place] with CP and found to have severe anemia with Hg of 3.2, FOBT+. He was admitted and received a total of 6u of PRBC. He had EGD with Dr Hilario and found to have a few benign sessile polyps in antrum and fundus. He also had colonoscopy with Dr Fajardo that showed colon polyp and internal and external hemorrhoids. 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 During admission he was noted to have thrombocytopenia as well w/ PLT count dropping to ~50k prior to discharge.    He was referred for outpatient capsule endoscopy which as per pt he had 9/2019, results not available.    Interval History  Today 9/9/22: Patient here today for follow up visit. He is taking oral iron three times daily as directed without any issues. Denies any excessive fatigue, abnormal bleeding or bruising. Denies N/V/D/C, melena, or hematochezia. Continues to have chronic ALVES unchanged from prior. He continues to follow up with cardiology. He reports improvement in SOB with albuterol inhaler and rest. He reports compliance with BP medications.  Denies headaches or dizziness. He does not wish to see GI at this time. No other complaints or concerns reported.    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, hemorrhagic gastritis  PSHx: PCA, cataract,  appendectomy  Social Hx: no ETOH, drugs, or tobacco  Allergies: NKDA  Meds: reviewed    Family Hx: none pertinent    Labs:  9/8/22 Cr 1.18, Alb 3.9, , iron 56, TIBC 333, Ferritin 34, folic acid 10.87, B12 1872, WBC 3.46, Hgb 11.7,  MCV 93, , retic 2.13  5/6/22 Cr 1.33 Alb 3.9  Iron 77 TIBC 345 Ferritin 42 WBC 3.41 RBC 3.91 Hg 11.9 MCV 94.1 RDW 13.5  Retic 2.65%  1/17/22 Cr 1.24, Alb 4.1, Iron 57, TIBC 324, iron sat 18%, Ferritin 56, folic acid 9.29, B12 1799, WBC 3.60, Hgb 13.0, MCV  93.8,   9/20/21 Cr 1.25 Alb 4.3  Iron 75 TIBC 345 Ferritin 48 WBC 4.09 Hg 13.3   7/20/21 Cr 1.55, Alb 3.8, TP 6.8, iron 47, TIBC 334, Ferritin 30, WBC 3.71, Hgb 12.6, MCV 92.3, RDW 13.0, , retic 1.93%  6/7/21 Cr 1.16, Alb 4.1, TP 7.3, iron 52, TIBC 371, Ferritin 21, Folic acid 13.76, B12 >2000, WBC 3.74, Hgb 11.9, MCV 93.2, RDW 13.7, , retic 3.34  4/12/21 Cr 1.17 Alb 4.2 TP 7.6 Iron 55 TIBC 342. Ferritin 40 Folate 12.68 B12>2000 WBC 3.72 RBC 4.56 Hg 14 MCV 92.1 RDW 13.6  Retic 120k ANC 2.06  2/18/21 Glu 239, Cr 1.24, TP 7.1 Alb 4.0, Ca 9.1 Iron 86 TIBC 322 Iron sat 27% Ferritin 35 Folic acid 17.37, B12 1204, WBC 4.08, Hgb 13.1, , ANC  2.51, retic 2.43%  12/9/20 , CR 1.18, TP 6.5, ALB 3.7, calcium 8.6, iron 100, TIBC 295, iron sat 34%, ferritin 31, folic acid 15.54, B12 1187, WBC 3.64, Hg 12.3, , ANC 2.16, retic 1K  8/18/20 Cr 1.3 Alb 4 TP 6.9 AST 12 ALT 10 Iron 83 TIBC 295 Ferritin 50 Folate 14.91 B12 1671 WBC 4.46 Hg 12.6 RBC 4.13 MCV 95.6 RDW 14.1  Retic % 2.53 Abs retic 100k  3/25/20 Cr 1.13 iron 86 TIBC 278 Ferritin 119 Folate 16 B12 >2000 WBC 2.80 RBC 3.91 Hgb 12.2 MCV 97  retic 2.45% ANC 1.70  1/31/20 Cr 1.23 Alb 4.2 Iron 57 TIBC 285 Ferritin 158 WBC 3.31 Hg 13 RBC 4.16 MCV 97.1  Retic ~85k ANC 1.75  12/6/19 Iron 89, TIBC 288, iron sat 31% Ferritin 378, Folic acid 18.04, b12 1535, Hgb 12.4, , WBC 3.54, ANC  1.96  10/22/19 WBC 3.5 Hg 11.1 MCV 92 RDW 17.6  Ferritin 12 Iron 61 TIBC 378  9/25/19 Iron 55 TIBC 393 Ferritin 32 Folate 18.8 B12 1453  8/13/19 Hg 9.5 MCV 80.4 WBC 4.75 PLT 52k RBC 3.78  8/9/19 Hg 3.2 MCV 65 RBC 1.89 RDW 22.4 PLT 119k    Lab History:  Iron 114, ferritin 10-- Hgb 10.5-- 1/21/2014  Iron 48, ferritin 9, iron sat 12%, Hgb 9.6-- 3/25/2014  Iron 31, ferritin 6, iron sat 8%, Hgb 8.7--5/22/2014  Iron 60, ferritin 49, iron sat 20%, Hgb 12.7- TIBC  305- 7/23/2014  Iron 12, ferritin 5, iron sat 3%, Hgb 6.4 Hct 22.5 TIBC 388--12/22/2014.  Iron 11, TIBC 446, iron sat 2.5%, ferritin 4.8-- 5/28/2015  Iron 75, TIBC 317, iron sat 24%, ferritin 538-- 6/23/2015  Iron 71, TIBC 355, iron sat 20%, ferritin 17--9/03/2015  Iron 107, TIBC 285, iron sat 38%, ferritin 255-- 10/15/2015  Iron 11, TIBC 305, iron sat 4%, ferritin 6 -- 02/12/2016  Iron 126, TIBC 335, iron sat 38%, ferritin 18- 3/14/16  Retic 4%, Ferritin 18- 4/19/16  Ferritin 14, retic 3%- 5/10/16  Iron  141, TIBC 333 , Iron sat  42%, Ferritin 18,  Retic 2.66  -6/6/16  Iron 209, TIBC 360, iron sat 58%, ferritin 21 - 8/30/16  10/2016 Ferritin 14, iron 24, iron sat 7% -->Injectafer ordered 12/7/16 11/29/16 HGB 7.0--:> transfused  1/9/17  HGB 11.8, MCV 96, Ferritin 257  2/20/17 Ferritin 18, HGB 11.8  4/12/17 WBC 3.1, HGB 7.1, MCV 80, PLT 282K, Ferritin 6--> Injectafer 4/12/17 and 4/16/17 4/29/17 Ferritin 266  6/5/17 WBC 2.4, Hgb 7.4, MCV 87, , ferritin 10.--> Injectafer for 6/5/17 and 2 units packed red blood cells  7/5/17 WBC 8.1, Hgb 10.9, PLT 2:30 1K, MCV 96, ferritin 247  9/11/2017 WBC 2.8, Hgb 11.5, , ferritin 421  1/24/17 WBC 3.71, HGB 11.3, PLT 319K Ferritin 319  3/28/18 WBC 2.5, HGB 11.3, MCV 93, PLT 151K Ferritin 294    EGD 11/8/2010--diffuse hemorrhagic gastritis, dede negative  EGD 10/09/12 -- hemorrhagic gastritis  Colonoscopies done in 2004 and 2010 (2010 benign polyps found)        EGD 04/07/15 -- 4 bleeding AVMs in  [place]  EGD 6/2019 esophageal hiatal hernia, ring in GEJ, erythema in lower 3rd of esophagus, ulcer in antrum, gastritis. Normal duodenum. Repeat EGD 8/10/19 normal stomach and esophagus with few benign gastric polyps  Colonoscopy 8/13/19 with polyps, internal and external hemorrhoids       Imaging: none pertinent    Path:  8/9/19 sigmoid colon biopsy: polypoid colonic mucosa with benign lymphoid aggregate and reactive mucosal changes. no hyperplastic polyp, adenomatous change or malignancy    Review of Systems  CONSTITUTIONAL: no fevers, no chills, no weight loss, +mild chronic fatigue, no weakness  HEMATOLOGIC: no abnormal bleeding, no abnormal bruising, no drenching  night sweats  ONCOLOGIC: no new masses or lumps  HEENT: no vision loss, no tinnitus or hearing loss, no nose bleeding, no dysphagia, no odynophagia  CVS: no chest pain, no palpitations, +mild chronic dyspnea on exertion  RESP: + shortness of breath, no hemoptysis, no cough  GI: no nausea, no vomiting, no diarrhea, no constipation, no melena, no hematochezia, no hematemesis, no abdominal pain, no increase in abdominal girth  : no dysuria, no hematuria, no hesitancy, no scrotal swelling, no discharge  INTEGUMENT: no rashes, no abnormal bruising, no nail pitting, no hyperpigmentation  NEURO: no falls, no memory loss, no paresthesias or dysesthesias, no urofecal incontinence or retention, no loss of strength on any extremity  MSK: no back pain, no new joint pain, no joint swelling  PSYCH: no suicidal or homicidal ideation, no depression, no insomnia, no anhedonia  ENDOCRINE: no heat or cold intolerance, no polyuria, no polydipsia    Physical Exam  Vitals:    09/09/22 1021   BP: (!) 154/80   Pulse: 61   Resp: 18   Temp: 98.2 °F (36.8 °C)      ECOG PS 1  GA: AAOx3, NAD  HEENT: NCAT, PERRLA, EOMI, no oral ulcers  LYMPH: no cervical, axillary or supraclavicular adenopathy  CVS: s1s2 RRR, no M/R/G  RESP: CTA b/l, expiratory wheeze (noted prior), no  crackles or rhonchi  ABD: soft, NT, ND, BS+, no hepatosplenomegaly  EXT: no deformities, mild b/l pitting pedal edema  SKIN: no rashes, no bruises or purpura, warm and dry  NEURO: normal mentation, strength 5/5 on all 4 extremities, no sensory deficits     Assessment/Plan  1. Anemia due to iron deficiency D50.0  Given chronic CAD Hg should be >8/dl  MJ is due to recurrent GI blood loss.  Received IV Injectafer x 2 on 11/13/19 and 11/20/19 with appropriate response.  Ferritin remains relatively stable though somewhat lower than last visit, Hg stable from prior.  Retic remains elevated suggesting ongoing blood loss  C/w oral iron TID for now, tolerating well  Will consider IV iron on return if ferritin continues to drop  Referred back to GI previously but he did not follow up, Recommended he follow up with GI again, however he wishes to hold off on referral at this time. Will continue to monitor closely  Continue to f/u with cardiology as recommended, C/w inhaler PRN SOB  Return in 2 months with CBC, CMP, Iron panel, Ferritin and retic, folate, B12  Call if any questions or concerns    2. Thrombocytopenia D69.6  Multifactorial. During hospitalization at least partially dilutional due to transfusion of 6 u of PRBC as well as consumptive due to ongoing bleeding. Also likely due to medications including lasix, PPI and h2 blockers  PLT count has now normalized    3. Hypertension I10  States good control at home, c/w BP meds as prescribed       CINDY Gray

## 2022-09-09 ENCOUNTER — OFFICE VISIT (OUTPATIENT)
Dept: HEMATOLOGY/ONCOLOGY | Facility: CLINIC | Age: 82
End: 2022-09-09
Payer: MEDICARE

## 2022-09-09 VITALS
BODY MASS INDEX: 28.49 KG/M2 | TEMPERATURE: 98 F | SYSTOLIC BLOOD PRESSURE: 154 MMHG | DIASTOLIC BLOOD PRESSURE: 80 MMHG | HEART RATE: 61 BPM | RESPIRATION RATE: 18 BRPM | HEIGHT: 68 IN | WEIGHT: 188 LBS | OXYGEN SATURATION: 100 %

## 2022-09-09 DIAGNOSIS — I10 HYPERTENSION, UNSPECIFIED TYPE: ICD-10-CM

## 2022-09-09 DIAGNOSIS — D69.6 THROMBOCYTOPENIA: ICD-10-CM

## 2022-09-09 DIAGNOSIS — D50.0 IRON DEFICIENCY ANEMIA DUE TO CHRONIC BLOOD LOSS: Primary | ICD-10-CM

## 2022-09-09 PROCEDURE — 99213 PR OFFICE/OUTPT VISIT, EST, LEVL III, 20-29 MIN: ICD-10-PCS | Mod: ,,, | Performed by: NURSE PRACTITIONER

## 2022-09-09 PROCEDURE — 99213 OFFICE O/P EST LOW 20 MIN: CPT | Mod: ,,, | Performed by: NURSE PRACTITIONER

## 2022-09-09 RX ORDER — RANOLAZINE 1000 MG/1
500 TABLET, EXTENDED RELEASE ORAL 2 TIMES DAILY
COMMUNITY

## 2022-09-09 RX ORDER — FERROUS SULFATE 325(65) MG
325 TABLET ORAL 3 TIMES DAILY
COMMUNITY
End: 2023-03-16 | Stop reason: SDUPTHER

## 2022-11-30 ENCOUNTER — EXTERNAL HOME HEALTH (OUTPATIENT)
Dept: HOME HEALTH SERVICES | Facility: HOSPITAL | Age: 82
End: 2022-11-30
Payer: MEDICARE

## 2022-12-09 ENCOUNTER — DOCUMENT SCAN (OUTPATIENT)
Dept: HOME HEALTH SERVICES | Facility: HOSPITAL | Age: 82
End: 2022-12-09
Payer: MEDICARE

## 2023-01-13 ENCOUNTER — OFFICE VISIT (OUTPATIENT)
Dept: HEMATOLOGY/ONCOLOGY | Facility: CLINIC | Age: 83
End: 2023-01-13
Payer: MEDICARE

## 2023-01-13 VITALS
DIASTOLIC BLOOD PRESSURE: 77 MMHG | HEIGHT: 68 IN | OXYGEN SATURATION: 100 % | WEIGHT: 187.88 LBS | SYSTOLIC BLOOD PRESSURE: 139 MMHG | RESPIRATION RATE: 18 BRPM | BODY MASS INDEX: 28.47 KG/M2 | TEMPERATURE: 99 F | HEART RATE: 63 BPM

## 2023-01-13 DIAGNOSIS — D50.0 IRON DEFICIENCY ANEMIA DUE TO CHRONIC BLOOD LOSS: Primary | ICD-10-CM

## 2023-01-13 PROCEDURE — 99215 OFFICE O/P EST HI 40 MIN: CPT | Mod: ,,, | Performed by: STUDENT IN AN ORGANIZED HEALTH CARE EDUCATION/TRAINING PROGRAM

## 2023-01-13 PROCEDURE — 99215 PR OFFICE/OUTPT VISIT, EST, LEVL V, 40-54 MIN: ICD-10-PCS | Mod: ,,, | Performed by: STUDENT IN AN ORGANIZED HEALTH CARE EDUCATION/TRAINING PROGRAM

## 2023-01-13 RX ORDER — ISOSORBIDE MONONITRATE 60 MG/1
2 TABLET, EXTENDED RELEASE ORAL NIGHTLY
COMMUNITY

## 2023-01-13 RX ORDER — LANOLIN ALCOHOL/MO/W.PET/CERES
400 CREAM (GRAM) TOPICAL
COMMUNITY

## 2023-01-13 RX ORDER — METFORMIN HYDROCHLORIDE 500 MG/1
500 TABLET ORAL
COMMUNITY

## 2023-01-13 RX ORDER — MULTIVIT WITH MINERALS/HERBS
1 TABLET ORAL DAILY
COMMUNITY

## 2023-01-13 RX ORDER — LISINOPRIL 20 MG/1
20 TABLET ORAL
COMMUNITY

## 2023-01-13 RX ORDER — FLUTICASONE FUROATE, UMECLIDINIUM BROMIDE AND VILANTEROL TRIFENATATE 200; 62.5; 25 UG/1; UG/1; UG/1
POWDER RESPIRATORY (INHALATION)
COMMUNITY

## 2023-01-13 RX ORDER — RANOLAZINE 1000 MG/1
1000 TABLET, EXTENDED RELEASE ORAL
COMMUNITY

## 2023-01-13 RX ORDER — ROSUVASTATIN CALCIUM 20 MG/1
1 TABLET, COATED ORAL NIGHTLY
COMMUNITY

## 2023-01-13 RX ORDER — CARVEDILOL 6.25 MG/1
6.25 TABLET ORAL
COMMUNITY
Start: 2021-09-27 | End: 2023-10-24

## 2023-01-13 RX ORDER — GLIPIZIDE 10 MG/1
10 TABLET ORAL
COMMUNITY

## 2023-01-13 RX ORDER — AMLODIPINE BESYLATE 5 MG/1
7.5 TABLET ORAL
COMMUNITY
Start: 2021-09-27 | End: 2023-10-24

## 2023-01-13 NOTE — PROGRESS NOTES
PCP: Dr. 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 Lost to follow up    Has previously received multiple courses of IV iron. Multiple PRBC transfusions.    In 6/2019 he had EGD with Dr Fajardo that showed hiatal hernia, schatzki's ring, esophagitis, gastric ulcer and gastritis.    Most recently presented to [facility] [place] ER 8/2019 with CP and found to have severe anemia with Hg of 3.2, FOBT+. He was admitted and received a total of 6u of PRBC. He had EGD with Dr Hilario and found to have a few benign sessile polyps in antrum and fundus. He also had colonoscopy with Dr Fajardo that showed colon polyp and internal and external hemorrhoids. 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 During admission he was noted to have thrombocytopenia as well w/ PLT count dropping to ~50k prior to discharge.    He was referred for outpatient capsule endoscopy which as per pt he had 9/2019, results not available.    Interval History  Today, 01/13/2023, patient is status post cardiac catheterization in November 2022.  He reports being on aspirin currently under the direction of his cardiologist.  Patient reports intermittent symptoms of dark tarry stools.  He denies any chest pain, shortness of breath or chest tightness.  He reports compliance with oral iron.    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  9/8/22 Cr 1.18, Alb 3.9, , iron 56, TIBC 333, Ferritin 34, folic acid 10.87, B12 1872, WBC 3.46, Hgb 11.7,  MCV 93, , retic 2.13  5/6/22 Cr 1.33 Alb 3.9  Iron 77 TIBC 345 Ferritin 42 WBC 3.41 RBC 3.91 Hg 11.9 MCV 94.1 RDW 13.5  Retic 2.65%  1/17/22 Cr 1.24, Alb 4.1, Iron 57, TIBC 324, iron sat 18%, Ferritin 56, folic acid 9.29, B12 1799, WBC 3.60, Hgb 13.0, MCV  93.8,   9/20/21 Cr 1.25 Alb 4.3  Iron 75 TIBC 345 Ferritin 48 WBC 4.09 Hg 13.3   7/20/21 Cr 1.55, Alb 3.8, TP 6.8, iron 47, TIBC 334, Ferritin 30, WBC 3.71, Hgb 12.6, MCV 92.3, RDW 13.0, , retic 1.93%  6/7/21 Cr 1.16, Alb 4.1, TP 7.3, iron 52, TIBC 371, Ferritin 21, Folic acid 13.76, B12 >2000, WBC 3.74, Hgb 11.9, MCV 93.2, RDW 13.7, , retic 3.34  4/12/21 Cr 1.17 Alb 4.2 TP 7.6 Iron 55 TIBC 342. Ferritin 40 Folate 12.68 B12>2000 WBC 3.72 RBC 4.56 Hg 14 MCV 92.1 RDW 13.6  Retic 120k ANC 2.06  2/18/21 Glu 239, Cr 1.24, TP 7.1 Alb 4.0, Ca 9.1 Iron 86 TIBC 322 Iron sat 27% Ferritin 35 Folic acid 17.37, B12 1204, WBC 4.08, Hgb 13.1, , ANC  2.51, retic 2.43%  12/9/20 , CR 1.18, TP 6.5, ALB 3.7, calcium 8.6, iron 100, TIBC 295, iron sat 34%, ferritin 31, folic acid 15.54, B12 1187, WBC 3.64, Hg 12.3, , ANC 2.16, retic 1K  8/18/20 Cr 1.3 Alb 4 TP 6.9 AST 12 ALT 10 Iron 83 TIBC 295 Ferritin 50 Folate 14.91 B12 1671 WBC 4.46 Hg 12.6 RBC 4.13 MCV 95.6 RDW 14.1  Retic % 2.53 Abs retic 100k  3/25/20 Cr 1.13 iron 86 TIBC 278 Ferritin 119 Folate 16 B12 >2000 WBC 2.80 RBC 3.91 Hgb 12.2 MCV 97  retic 2.45% ANC 1.70  1/31/20 Cr 1.23 Alb 4.2 Iron 57 TIBC 285 Ferritin 158 WBC 3.31 Hg 13 RBC 4.16 MCV 97.1  Retic ~85k ANC 1.75  12/6/19 Iron 89, TIBC 288, iron sat 31% Ferritin 378, Folic acid 18.04, b12  1535, Hgb 12.4, , WBC 3.54, ANC 1.96  10/22/19 WBC 3.5 Hg 11.1 MCV 92 RDW 17.6  Ferritin 12 Iron 61 TIBC 378  9/25/19 Iron 55 TIBC 393 Ferritin 32 Folate 18.8 B12 1453  8/13/19 Hg 9.5 MCV 80.4 WBC 4.75 PLT 52k RBC 3.78  8/9/19 Hg 3.2 MCV 65 RBC 1.89 RDW 22.4 PLT 119k    Lab History:  Iron 114, ferritin 10-- Hgb 10.5-- 1/21/2014  Iron 48, ferritin 9, iron sat 12%, Hgb 9.6-- 3/25/2014  Iron 31, ferritin 6, iron sat 8%, Hgb 8.7--5/22/2014  Iron 60, ferritin 49, iron sat 20%, Hgb 12.7- TIBC  305- 7/23/2014  Iron 12, ferritin 5, iron sat 3%, Hgb 6.4 Hct 22.5 TIBC 388--12/22/2014.  Iron 11, TIBC 446, iron sat 2.5%, ferritin 4.8-- 5/28/2015  Iron 75, TIBC 317, iron sat 24%, ferritin 538-- 6/23/2015  Iron 71, TIBC 355, iron sat 20%, ferritin 17--9/03/2015  Iron 107, TIBC 285, iron sat 38%, ferritin 255-- 10/15/2015  Iron 11, TIBC 305, iron sat 4%, ferritin 6 -- 02/12/2016  Iron 126, TIBC 335, iron sat 38%, ferritin 18- 3/14/16  Retic 4%, Ferritin 18- 4/19/16  Ferritin 14, retic 3%- 5/10/16  Iron  141, TIBC 333 , Iron sat  42%, Ferritin 18,  Retic 2.66  -6/6/16  Iron 209, TIBC 360, iron sat 58%, ferritin 21 - 8/30/16  10/2016 Ferritin 14, iron 24, iron sat 7% -->Injectafer ordered 12/7/16 11/29/16 HGB 7.0--:> transfused  1/9/17  HGB 11.8, MCV 96, Ferritin 257  2/20/17 Ferritin 18, HGB 11.8  4/12/17 WBC 3.1, HGB 7.1, MCV 80, PLT 282K, Ferritin 6--> Injectafer 4/12/17 and 4/16/17 4/29/17 Ferritin 266  6/5/17 WBC 2.4, Hgb 7.4, MCV 87, , ferritin 10.--> Injectafer for 6/5/17 and 2 units packed red blood cells  7/5/17 WBC 8.1, Hgb 10.9, PLT 2:30 1K, MCV 96, ferritin 247  9/11/2017 WBC 2.8, Hgb 11.5, , ferritin 421  1/24/17 WBC 3.71, HGB 11.3, PLT 319K Ferritin 319  3/28/18 WBC 2.5, HGB 11.3, MCV 93, PLT 151K Ferritin 294    EGD 11/8/2010--diffuse hemorrhagic gastritis, dede negative  EGD 10/09/12 -- hemorrhagic gastritis  Colonoscopies done in 2004 and 2010 (2010 benign polyps found)        EGD  04/07/15 -- 4 bleeding AVMs in [place]  EGD 6/2019 esophageal hiatal hernia, ring in GEJ, erythema in lower 3rd of esophagus, ulcer in antrum, gastritis. Normal duodenum. Repeat EGD 8/10/19 normal stomach and esophagus with few benign gastric polyps  Colonoscopy 8/13/19 with polyps, internal and external hemorrhoids       Imaging: none pertinent    Path:  8/9/19 sigmoid colon biopsy: polypoid colonic mucosa with benign lymphoid aggregate and reactive mucosal changes. no hyperplastic polyp, adenomatous change or malignancy    Review of Systems  CONSTITUTIONAL: no fevers, no chills, no weight loss, +mild chronic fatigue, no weakness  HEMATOLOGIC: no abnormal bleeding, no abnormal bruising, no drenching  night sweats  ONCOLOGIC: no new masses or lumps  HEENT: no vision loss, no tinnitus or hearing loss, no nose bleeding, no dysphagia, no odynophagia  CVS: no chest pain, no palpitations, +mild chronic dyspnea on exertion  RESP: + shortness of breath, no hemoptysis, no cough  GI: no nausea, no vomiting, no diarrhea, no constipation, no melena, no hematochezia, no hematemesis, no abdominal pain, no increase in abdominal girth  : no dysuria, no hematuria, no hesitancy, no scrotal swelling, no discharge  INTEGUMENT: no rashes, no abnormal bruising, no nail pitting, no hyperpigmentation  NEURO: no falls, no memory loss, no paresthesias or dysesthesias, no urofecal incontinence or retention, no loss of strength on any extremity  MSK: no back pain, no new joint pain, no joint swelling  PSYCH: no suicidal or homicidal ideation, no depression, no insomnia, no anhedonia  ENDOCRINE: no heat or cold intolerance, no polyuria, no polydipsia    Physical Exam  Vitals:    01/13/23 1039   BP: 139/77   Pulse: 63   Resp: 18   Temp: 98.8 °F (37.1 °C)        ECOG PS 1  GA: AAOx3, NAD  HEENT: NCAT, PERRLA, EOMI, no oral ulcers  LYMPH: no cervical, axillary or supraclavicular adenopathy  CVS: s1s2 RRR, no M/R/G  RESP: CTA b/l, expiratory  wheeze (noted prior), no crackles or rhonchi  ABD: soft, NT, ND, BS+, no hepatosplenomegaly  EXT: no deformities, mild b/l pitting pedal edema  SKIN: no rashes, no bruises or purpura, warm and dry  NEURO: normal mentation, strength 5/5 on all 4 extremities, no sensory deficits     Assessment/Plan  1. Anemia due to iron deficiency D50.0  Given chronic CAD Hg should be >8/dl  MJ is due to recurrent GI blood loss.  Received IV Injectafer x 2 on 11/13/19 and 11/20/19 with appropriate response.  Continue to f/u with cardiology as recommended, C/w inhaler PRN SOB    2. Thrombocytopenia D69.6  Multifactorial. During hospitalization at least partially dilutional due to transfusion of 6 u of PRBC as well as consumptive due to ongoing bleeding. Also likely due to medications including lasix, PPI and h2 blockers  PLT count has now normalized      Plan   Reviewed labs, noted declining hemoglobin 9.3 grams/deciliter with a ferritin at 14, with % saturation 21 and TIBC 418.  Will plan for IV Injectafer x2 given decrease in ferritin and hemoglobin despite oral iron repletion   Referral to Dr. Fajardo, GI for EGD/colonoscopy given symptoms of dark tarry stools.  Follow-up in clinic in 8 weeks with repeat CBC, CMP, iron panel, ferritin,       A total of  40 minutes were spent in review of records, interpretation of test, coordination of care, discussion and counseling with the patient.          Portions of the record may have been created with voice recognition software. Occasional wrong-word or sound-a-like substitutions may have occurred due to the inherent limitations of voice recognition software. Read the chart carefully and recognize, using context, where substitutions have occurred.

## 2023-01-23 RX ORDER — SODIUM CHLORIDE 9 MG/ML
INJECTION, SOLUTION INTRAVENOUS CONTINUOUS
Status: CANCELLED | OUTPATIENT
Start: 2023-01-23

## 2023-01-23 RX ORDER — HEPARIN 100 UNIT/ML
5 SYRINGE INTRAVENOUS
Status: CANCELLED | OUTPATIENT
Start: 2023-01-23

## 2023-01-23 RX ORDER — SODIUM CHLORIDE 0.9 % (FLUSH) 0.9 %
10 SYRINGE (ML) INJECTION
Status: CANCELLED | OUTPATIENT
Start: 2023-01-23

## 2023-03-16 ENCOUNTER — OFFICE VISIT (OUTPATIENT)
Dept: HEMATOLOGY/ONCOLOGY | Facility: CLINIC | Age: 83
End: 2023-03-16
Payer: MEDICARE

## 2023-03-16 VITALS
TEMPERATURE: 98 F | HEART RATE: 89 BPM | WEIGHT: 184.38 LBS | HEIGHT: 68 IN | OXYGEN SATURATION: 100 % | SYSTOLIC BLOOD PRESSURE: 120 MMHG | BODY MASS INDEX: 27.94 KG/M2 | DIASTOLIC BLOOD PRESSURE: 69 MMHG | RESPIRATION RATE: 18 BRPM

## 2023-03-16 DIAGNOSIS — D50.0 IRON DEFICIENCY ANEMIA DUE TO CHRONIC BLOOD LOSS: Primary | ICD-10-CM

## 2023-03-16 PROCEDURE — 99215 PR OFFICE/OUTPT VISIT, EST, LEVL V, 40-54 MIN: ICD-10-PCS | Mod: ,,,

## 2023-03-16 PROCEDURE — 99215 OFFICE O/P EST HI 40 MIN: CPT | Mod: ,,,

## 2023-03-16 RX ORDER — ASCORBIC ACID 500 MG
TABLET ORAL
Qty: 60 TABLET | Refills: 3 | Status: SHIPPED | OUTPATIENT
Start: 2023-03-16 | End: 2023-05-11 | Stop reason: SDUPTHER

## 2023-03-16 RX ORDER — FERROUS SULFATE 325(65) MG
TABLET ORAL
Qty: 60 TABLET | Refills: 2 | Status: SHIPPED | OUTPATIENT
Start: 2023-03-16 | End: 2023-05-11 | Stop reason: SDUPTHER

## 2023-03-16 RX ORDER — BRIMONIDINE TARTRATE 2 MG/ML
1 SOLUTION/ DROPS OPHTHALMIC EVERY 8 HOURS
COMMUNITY

## 2023-03-16 NOTE — PROGRESS NOTES
PCP: Dr. 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 Lost to follow up    Has previously received multiple courses of IV iron. Multiple PRBC transfusions.    In 6/2019 he had EGD with Dr Fajardo that showed hiatal hernia, schatzki's ring, esophagitis, gastric ulcer and gastritis.    Most recently presented to [facility] [place] ER 8/2019 with CP and found to have severe anemia with Hg of 3.2, FOBT+. He was admitted and received a total of 6u of PRBC. He had EGD with Dr Hilario and found to have a few benign sessile polyps in antrum and fundus. He also had colonoscopy with Dr Fajardo that showed colon polyp and internal and external hemorrhoids. 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 During admission he was noted to have thrombocytopenia as well w/ PLT count dropping to ~50k prior to discharge.    He was referred for outpatient capsule endoscopy which as per pt he had 9/2019, results not available.    Interval History  Today, 03/16/23,  Patient continues with intermittent symptoms of dark tarry stools.  He was referred to GI last visit but was unaware of an appt. For visit. He denies any chest pain, shortness of breath or chest tightness.  He is s/p IV Injectafer received at end of January 2023.      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  9/8/22 Cr 1.18, Alb 3.9, , iron 56, TIBC 333, Ferritin 34, folic acid 10.87, B12 1872, WBC 3.46, Hgb 11.7,  MCV 93, , retic 2.13  5/6/22 Cr 1.33 Alb 3.9  Iron 77 TIBC 345 Ferritin 42 WBC 3.41 RBC 3.91 Hg 11.9 MCV 94.1 RDW 13.5  Retic 2.65%  1/17/22 Cr 1.24, Alb 4.1, Iron 57, TIBC 324, iron sat 18%, Ferritin 56, folic acid 9.29, B12 1799, WBC 3.60, Hgb 13.0, MCV  93.8,   9/20/21 Cr 1.25 Alb 4.3  Iron 75 TIBC 345 Ferritin 48 WBC 4.09 Hg 13.3   7/20/21 Cr 1.55, Alb 3.8, TP 6.8, iron 47, TIBC 334, Ferritin 30, WBC 3.71, Hgb 12.6, MCV 92.3, RDW 13.0, , retic 1.93%  6/7/21 Cr 1.16, Alb 4.1, TP 7.3, iron 52, TIBC 371, Ferritin 21, Folic acid 13.76, B12 >2000, WBC 3.74, Hgb 11.9, MCV 93.2, RDW 13.7, , retic 3.34  4/12/21 Cr 1.17 Alb 4.2 TP 7.6 Iron 55 TIBC 342. Ferritin 40 Folate 12.68 B12>2000 WBC 3.72 RBC 4.56 Hg 14 MCV 92.1 RDW 13.6  Retic 120k ANC 2.06  2/18/21 Glu 239, Cr 1.24, TP 7.1 Alb 4.0, Ca 9.1 Iron 86 TIBC 322 Iron sat 27% Ferritin 35 Folic acid 17.37, B12 1204, WBC 4.08, Hgb 13.1, , ANC  2.51, retic 2.43%  12/9/20 , CR 1.18, TP 6.5, ALB 3.7, calcium 8.6, iron 100, TIBC 295, iron sat 34%, ferritin 31, folic acid 15.54, B12 1187, WBC 3.64, Hg 12.3, , ANC 2.16, retic 1K  8/18/20 Cr 1.3 Alb 4 TP 6.9 AST 12 ALT 10 Iron 83 TIBC 295 Ferritin 50 Folate 14.91 B12 1671 WBC 4.46 Hg 12.6 RBC 4.13 MCV 95.6 RDW 14.1  Retic % 2.53 Abs retic 100k  3/25/20 Cr 1.13 iron 86 TIBC 278 Ferritin 119 Folate 16 B12 >2000 WBC 2.80 RBC 3.91 Hgb 12.2 MCV 97  retic 2.45% ANC 1.70  1/31/20 Cr 1.23 Alb 4.2 Iron 57 TIBC 285 Ferritin 158 WBC 3.31 Hg 13 RBC 4.16 MCV 97.1  Retic ~85k ANC 1.75  12/6/19 Iron 89, TIBC  288, iron sat 31% Ferritin 378, Folic acid 18.04, b12 1535, Hgb 12.4, , WBC 3.54, ANC 1.96  10/22/19 WBC 3.5 Hg 11.1 MCV 92 RDW 17.6  Ferritin 12 Iron 61 TIBC 378  9/25/19 Iron 55 TIBC 393 Ferritin 32 Folate 18.8 B12 1453  8/13/19 Hg 9.5 MCV 80.4 WBC 4.75 PLT 52k RBC 3.78  8/9/19 Hg 3.2 MCV 65 RBC 1.89 RDW 22.4 PLT 119k    Lab History:  Iron 114, ferritin 10-- Hgb 10.5-- 1/21/2014  Iron 48, ferritin 9, iron sat 12%, Hgb 9.6-- 3/25/2014  Iron 31, ferritin 6, iron sat 8%, Hgb 8.7--5/22/2014  Iron 60, ferritin 49, iron sat 20%, Hgb 12.7- TIBC  305- 7/23/2014  Iron 12, ferritin 5, iron sat 3%, Hgb 6.4 Hct 22.5 TIBC 388--12/22/2014.  Iron 11, TIBC 446, iron sat 2.5%, ferritin 4.8-- 5/28/2015  Iron 75, TIBC 317, iron sat 24%, ferritin 538-- 6/23/2015  Iron 71, TIBC 355, iron sat 20%, ferritin 17--9/03/2015  Iron 107, TIBC 285, iron sat 38%, ferritin 255-- 10/15/2015  Iron 11, TIBC 305, iron sat 4%, ferritin 6 -- 02/12/2016  Iron 126, TIBC 335, iron sat 38%, ferritin 18- 3/14/16  Retic 4%, Ferritin 18- 4/19/16  Ferritin 14, retic 3%- 5/10/16  Iron  141, TIBC 333 , Iron sat  42%, Ferritin 18,  Retic 2.66  -6/6/16  Iron 209, TIBC 360, iron sat 58%, ferritin 21 - 8/30/16  10/2016 Ferritin 14, iron 24, iron sat 7% -->Injectafer ordered 12/7/16 11/29/16 HGB 7.0--:> transfused  1/9/17  HGB 11.8, MCV 96, Ferritin 257  2/20/17 Ferritin 18, HGB 11.8  4/12/17 WBC 3.1, HGB 7.1, MCV 80, PLT 282K, Ferritin 6--> Injectafer 4/12/17 and 4/16/17 4/29/17 Ferritin 266  6/5/17 WBC 2.4, Hgb 7.4, MCV 87, , ferritin 10.--> Injectafer for 6/5/17 and 2 units packed red blood cells  7/5/17 WBC 8.1, Hgb 10.9, PLT 2:30 1K, MCV 96, ferritin 247  9/11/2017 WBC 2.8, Hgb 11.5, , ferritin 421  1/24/17 WBC 3.71, HGB 11.3, PLT 319K Ferritin 319  3/28/18 WBC 2.5, HGB 11.3, MCV 93, PLT 151K Ferritin 294    EGD 11/8/2010--diffuse hemorrhagic gastritis, dede negative  EGD 10/09/12 -- hemorrhagic gastritis  Colonoscopies done  in 2004 and 2010 (2010 benign polyps found)        EGD 04/07/15 -- 4 bleeding AVMs in [place]  EGD 6/2019 esophageal hiatal hernia, ring in GEJ, erythema in lower 3rd of esophagus, ulcer in antrum, gastritis. Normal duodenum. Repeat EGD 8/10/19 normal stomach and esophagus with few benign gastric polyps  Colonoscopy 8/13/19 with polyps, internal and external hemorrhoids       Imaging: none pertinent    Path:  8/9/19 sigmoid colon biopsy: polypoid colonic mucosa with benign lymphoid aggregate and reactive mucosal changes. no hyperplastic polyp, adenomatous change or malignancy    Review of Systems  CONSTITUTIONAL: no fevers, no chills, no weight loss, +mild chronic fatigue, no weakness  HEMATOLOGIC: no abnormal bleeding, no abnormal bruising, no drenching  night sweats  ONCOLOGIC: no new masses or lumps  HEENT: no vision loss, no tinnitus or hearing loss, no nose bleeding, no dysphagia, no odynophagia  CVS: no chest pain, no palpitations, +mild chronic dyspnea on exertion  RESP: + shortness of breath, no hemoptysis, no cough  GI: no nausea, no vomiting, no diarrhea, no constipation, no melena, no hematochezia, no hematemesis, no abdominal pain, no increase in abdominal girth  : no dysuria, no hematuria, no hesitancy, no scrotal swelling, no discharge  INTEGUMENT: no rashes, no abnormal bruising, no nail pitting, no hyperpigmentation  NEURO: no falls, no memory loss, no paresthesias or dysesthesias, no urofecal incontinence or retention, no loss of strength on any extremity  MSK: no back pain, no new joint pain, no joint swelling  PSYCH: no suicidal or homicidal ideation, no depression, no insomnia, no anhedonia  ENDOCRINE: no heat or cold intolerance, no polyuria, no polydipsia    Physical Exam  Vitals:    03/16/23 1317   BP: 120/69   Pulse: 89   Resp: 18   Temp: 98.2 °F (36.8 °C)        ECOG PS 1  GA: AAOx3, NAD  HEENT: NCAT, PERRLA, EOMI, no oral ulcers  LYMPH: no cervical, axillary or supraclavicular  adenopathy  CVS: s1s2 RRR, no M/R/G  RESP: CTA b/l, expiratory wheeze (noted prior), no crackles or rhonchi  ABD: soft, NT, ND, BS+, no hepatosplenomegaly  EXT: no deformities, mild b/l pitting pedal edema  SKIN: no rashes, no bruises or purpura, warm and dry  NEURO: normal mentation, strength 5/5 on all 4 extremities, no sensory deficits     Assessment/Plan  1. Anemia due to iron deficiency D50.0  Given chronic CAD Hg should be >8/dl  MJ is due to recurrent GI blood loss.  Received IV Injectafer x 2 on 11/13/19 and 11/20/19 with appropriate response.  Continue to f/u with cardiology as recommended, C/w inhaler PRN SOB    2. Thrombocytopenia D69.6  Multifactorial. During hospitalization at least partially dilutional due to transfusion of 6 u of PRBC as well as consumptive due to ongoing bleeding. Also likely due to medications including lasix, PPI and h2 blockers  PLT count has now normalized      Plan   Reviewed labs, iron low 56 mcg/dL and hgb 10.0  Will resume oral iron to keep iron levels stable if possible  Resend referral to Dr. Fajardo, GI for EGD/colonoscopy given symptoms of dark tarry stools.  Oral iron refill sent to pharmacy with vit C 500 mg tablets.  Follow-up in clinic in 8 weeks with repeat CBC, CMP, iron panel, ferritin

## 2023-05-11 ENCOUNTER — OFFICE VISIT (OUTPATIENT)
Dept: HEMATOLOGY/ONCOLOGY | Facility: CLINIC | Age: 83
End: 2023-05-11
Payer: MEDICARE

## 2023-05-11 VITALS
OXYGEN SATURATION: 100 % | BODY MASS INDEX: 28.34 KG/M2 | RESPIRATION RATE: 18 BRPM | HEIGHT: 68 IN | SYSTOLIC BLOOD PRESSURE: 123 MMHG | TEMPERATURE: 98 F | WEIGHT: 187 LBS | DIASTOLIC BLOOD PRESSURE: 68 MMHG | HEART RATE: 60 BPM

## 2023-05-11 DIAGNOSIS — D50.0 IRON DEFICIENCY ANEMIA DUE TO CHRONIC BLOOD LOSS: ICD-10-CM

## 2023-05-11 PROCEDURE — 99214 OFFICE O/P EST MOD 30 MIN: CPT | Mod: ,,,

## 2023-05-11 PROCEDURE — 99214 PR OFFICE/OUTPT VISIT, EST, LEVL IV, 30-39 MIN: ICD-10-PCS | Mod: ,,,

## 2023-05-11 RX ORDER — FERROUS SULFATE 325(65) MG
1 TABLET ORAL DAILY
COMMUNITY
End: 2023-05-11

## 2023-05-11 RX ORDER — POLYETHYLENE GLYCOL 3350 17 G/17G
17 POWDER, FOR SOLUTION ORAL
COMMUNITY
Start: 2023-03-23

## 2023-05-11 RX ORDER — FERROUS SULFATE 325(65) MG
TABLET ORAL
Qty: 60 TABLET | Refills: 3 | Status: SHIPPED | OUTPATIENT
Start: 2023-05-11 | End: 2023-06-22 | Stop reason: SDUPTHER

## 2023-05-11 RX ORDER — ASCORBIC ACID 500 MG
TABLET ORAL
Qty: 60 TABLET | Refills: 3 | Status: SHIPPED | OUTPATIENT
Start: 2023-05-11 | End: 2023-06-22 | Stop reason: SDUPTHER

## 2023-05-11 NOTE — PROGRESS NOTES
PCP: Dr. 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 Lost to follow up    Has previously received multiple courses of IV iron. Multiple PRBC transfusions.    In 6/2019 he had EGD with Dr Fajardo that showed hiatal hernia, schatzki's ring, esophagitis, gastric ulcer and gastritis.    Most recently presented to [facility] [place] ER 8/2019 with CP and found to have severe anemia with Hg of 3.2, FOBT+. He was admitted and received a total of 6u of PRBC. He had EGD with Dr Hilario and found to have a few benign sessile polyps in antrum and fundus. He also had colonoscopy with Dr Fajardo that showed colon polyp and internal and external hemorrhoids. 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 During admission he was noted to have thrombocytopenia as well w/ PLT count dropping to ~50k prior to discharge.    He was referred for outpatient capsule endoscopy which as per pt he had 9/2019, results not available.    Interval History  Today, 05/11/23,  Patient continues with intermittent symptoms of dark tarry stools.  He had a consult with GI several weeks ago and is scheduled for a colonoscopy 5/15/23. He does report sob.  He denies any chest pain or chest tightness.  He continues to take oral iron BID.    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, hemorrhagic gastritis  PSHx: PCA, cataract, appendectomy  Social Hx: no ETOH, drugs, or tobacco  Allergies: NKDA  Meds: reviewed    Family Hx: none pertinent    Labs:  05/09/23: CMP unremarkable, iron 67, iron sat 21%, ferritin 21, wbc 4.82, hgb 11.1, plt 203, ANC 3.01  03/07/23: cr 1.16, ca 8.7, iron 56, ironsat 20%, ferritin  97, wbc 3.94, hgb 10.0, plt 228, ANC 2.59  01/05/2023:  Creatinine 1.26, albumin 4.1, calcium 9.2, alkaline phosphatase 56, total bilirubin 0.2, AST 12, ALT 13, , iron 89, TIBC 418, % saturation 21, ferritin 14, folic acid 10.7, vitamin B12 983, WBC count 3.47, hemoglobin 9.3, MCV 90.5, platelet count 237.  9/8/22 Cr 1.18, Alb 3.9, , iron 56, TIBC 333, Ferritin 34, folic acid 10.87, B12 1872, WBC 3.46, Hgb 11.7,  MCV 93, , retic 2.13  5/6/22 Cr 1.33 Alb 3.9  Iron 77 TIBC 345 Ferritin 42 WBC 3.41 RBC 3.91 Hg 11.9 MCV 94.1 RDW 13.5  Retic 2.65%  1/17/22 Cr 1.24, Alb 4.1, Iron 57, TIBC 324, iron sat 18%, Ferritin 56, folic acid 9.29, B12 1799, WBC 3.60, Hgb 13.0, MCV  93.8,   9/20/21 Cr 1.25 Alb 4.3  Iron 75 TIBC 345 Ferritin 48 WBC 4.09 Hg 13.3   7/20/21 Cr 1.55, Alb 3.8, TP 6.8, iron 47, TIBC 334, Ferritin 30, WBC 3.71, Hgb 12.6, MCV 92.3, RDW 13.0, , retic 1.93%  6/7/21 Cr 1.16, Alb 4.1, TP 7.3, iron 52, TIBC 371, Ferritin 21, Folic acid 13.76, B12 >2000, WBC 3.74, Hgb 11.9, MCV 93.2, RDW 13.7, , retic 3.34  4/12/21 Cr 1.17 Alb 4.2 TP 7.6 Iron 55 TIBC 342. Ferritin 40 Folate 12.68 B12>2000 WBC 3.72 RBC 4.56 Hg 14 MCV 92.1 RDW 13.6  Retic 120k ANC 2.06  2/18/21 Glu 239, Cr 1.24, TP 7.1 Alb 4.0, Ca 9.1 Iron 86 TIBC 322 Iron sat 27% Ferritin 35 Folic acid 17.37, B12 1204, WBC 4.08, Hgb 13.1, , ANC  2.51, retic 2.43%  12/9/20 , CR 1.18, TP 6.5, ALB 3.7, calcium 8.6, iron 100, TIBC 295, iron sat 34%, ferritin 31, folic acid 15.54, B12 1187, WBC 3.64, Hg 12.3, , ANC 2.16, retic 1K  8/18/20 Cr 1.3 Alb 4 TP 6.9 AST 12 ALT 10 Iron 83 TIBC 295 Ferritin 50 Folate 14.91 B12 1671 WBC 4.46 Hg 12.6 RBC 4.13 MCV 95.6 RDW 14.1  Retic % 2.53 Abs retic 100k  3/25/20 Cr 1.13 iron 86 TIBC 278 Ferritin 119 Folate 16 B12 >2000 WBC 2.80 RBC 3.91 Hgb 12.2 MCV 97  retic 2.45% ANC 1.70  1/31/20 Cr 1.23 Alb 4.2 Iron 57 TIBC 285  Ferritin 158 WBC 3.31 Hg 13 RBC 4.16 MCV 97.1  Retic ~85k ANC 1.75  12/6/19 Iron 89, TIBC 288, iron sat 31% Ferritin 378, Folic acid 18.04, b12 1535, Hgb 12.4, , WBC 3.54, ANC 1.96  10/22/19 WBC 3.5 Hg 11.1 MCV 92 RDW 17.6  Ferritin 12 Iron 61 TIBC 378  9/25/19 Iron 55 TIBC 393 Ferritin 32 Folate 18.8 B12 1453  8/13/19 Hg 9.5 MCV 80.4 WBC 4.75 PLT 52k RBC 3.78  8/9/19 Hg 3.2 MCV 65 RBC 1.89 RDW 22.4 PLT 119k    Lab History:  Iron 114, ferritin 10-- Hgb 10.5-- 1/21/2014  Iron 48, ferritin 9, iron sat 12%, Hgb 9.6-- 3/25/2014  Iron 31, ferritin 6, iron sat 8%, Hgb 8.7--5/22/2014  Iron 60, ferritin 49, iron sat 20%, Hgb 12.7- TIBC  305- 7/23/2014  Iron 12, ferritin 5, iron sat 3%, Hgb 6.4 Hct 22.5 TIBC 388--12/22/2014.  Iron 11, TIBC 446, iron sat 2.5%, ferritin 4.8-- 5/28/2015  Iron 75, TIBC 317, iron sat 24%, ferritin 538-- 6/23/2015  Iron 71, TIBC 355, iron sat 20%, ferritin 17--9/03/2015  Iron 107, TIBC 285, iron sat 38%, ferritin 255-- 10/15/2015  Iron 11, TIBC 305, iron sat 4%, ferritin 6 -- 02/12/2016  Iron 126, TIBC 335, iron sat 38%, ferritin 18- 3/14/16  Retic 4%, Ferritin 18- 4/19/16  Ferritin 14, retic 3%- 5/10/16  Iron  141, TIBC 333 , Iron sat  42%, Ferritin 18,  Retic 2.66  -6/6/16  Iron 209, TIBC 360, iron sat 58%, ferritin 21 - 8/30/16  10/2016 Ferritin 14, iron 24, iron sat 7% -->Injectafer ordered 12/7/16 11/29/16 HGB 7.0--:> transfused  1/9/17  HGB 11.8, MCV 96, Ferritin 257  2/20/17 Ferritin 18, HGB 11.8  4/12/17 WBC 3.1, HGB 7.1, MCV 80, PLT 282K, Ferritin 6--> Injectafer 4/12/17 and 4/16/17 4/29/17 Ferritin 266  6/5/17 WBC 2.4, Hgb 7.4, MCV 87, , ferritin 10.--> Injectafer for 6/5/17 and 2 units packed red blood cells  7/5/17 WBC 8.1, Hgb 10.9, PLT 2:30 1K, MCV 96, ferritin 247  9/11/2017 WBC 2.8, Hgb 11.5, , ferritin 421  1/24/17 WBC 3.71, HGB 11.3, PLT 319K Ferritin 319  3/28/18 WBC 2.5, HGB 11.3, MCV 93, PLT 151K Ferritin 294    EGD  11/8/2010--diffuse hemorrhagic gastritis, dede negative  EGD 10/09/12 -- hemorrhagic gastritis  Colonoscopies done in 2004 and 2010 (2010 benign polyps found)        EGD 04/07/15 -- 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 Normal duodenum. Repeat EGD 8/10/19 normal stomach and esophagus with few benign gastric polyps  Colonoscopy 8/13/19 with polyps, internal and external hemorrhoids       Imaging: none pertinent    Path:  8/9/19 sigmoid colon biopsy: polypoid colonic mucosa with benign lymphoid aggregate and reactive mucosal changes. no hyperplastic polyp, adenomatous change or malignancy    Review of Systems  CONSTITUTIONAL: no fevers, no chills, no weight loss, +mild chronic fatigue, no weakness  HEMATOLOGIC: no abnormal bleeding, no abnormal bruising, no drenching  night sweats  ONCOLOGIC: no new masses or lumps  HEENT: no vision loss, no tinnitus or hearing loss, no nose bleeding, no dysphagia, no odynophagia  CVS: no chest pain, no palpitations, +mild chronic dyspnea on exertion  RESP: + shortness of breath, no hemoptysis, no cough  GI: no nausea, no vomiting, no diarrhea, no constipation, no melena, no hematochezia, no hematemesis, no abdominal pain, no increase in abdominal girth  : no dysuria, no hematuria, no hesitancy, no scrotal swelling, no discharge  INTEGUMENT: no rashes, no abnormal bruising, no nail pitting, no hyperpigmentation  NEURO: no falls, no memory loss, no paresthesias or dysesthesias, no urofecal incontinence or retention, no loss of strength on any extremity  MSK: no back pain, no new joint pain, no joint swelling  PSYCH: no suicidal or homicidal ideation, no depression, no insomnia, no anhedonia  ENDOCRINE: no heat or cold intolerance, no polyuria, no polydipsia    Physical Exam  Vitals:    05/11/23 1445   BP: 123/68   Pulse: 60   Resp: 18   Temp: 97.9 °F (36.6 °C)          ECOG PS  1  GA: AAOx3, NAD  HEENT: NCAT, PERRLA, EOMI, no oral ulcers  LYMPH: no cervical, axillary or supraclavicular adenopathy  CVS: s1s2 RRR, no M/R/G  RESP: CTA b/l, expiratory wheeze (noted prior), no crackles or rhonchi  ABD: soft, NT, ND, BS+, no hepatosplenomegaly  EXT: no deformities, mild b/l pitting pedal edema  SKIN: no rashes, no bruises or purpura, warm and dry  NEURO: normal mentation, strength 5/5 on all 4 extremities, no sensory deficits     Assessment/Plan  1. Anemia due to iron deficiency D50.0  Given chronic CAD Hg should be >8/dl  MJ is due to recurrent GI blood loss.  Received IV Injectafer x 2 on 11/13/19 and 11/20/19 with appropriate response.  Continue to f/u with cardiology as recommended, C/w inhaler PRN SOB  Currently taking oral iron BID    2. Thrombocytopenia D69.6  Multifactorial. During hospitalization at least partially dilutional due to transfusion of 6 u of PRBC as well as consumptive due to ongoing bleeding. Also likely due to medications including lasix, PPI and h2 blockers  PLT count has now normalized      Plan   Reviewed labs, iron low 67 mcg/dL and hgb 11.1  Continue oral iron BID to keep iron levels stable if possible  Follow-up with colonoscopy with Dr Fajardo on 5/11/23  Oral iron refill sent to pharmacy with vit C 500 mg tablets today  Follow-up in clinic in 6 weeks with repeat CBC, CMP, iron panel, ferritin

## 2023-06-22 ENCOUNTER — OFFICE VISIT (OUTPATIENT)
Dept: HEMATOLOGY/ONCOLOGY | Facility: CLINIC | Age: 83
End: 2023-06-22
Payer: MEDICARE

## 2023-06-22 VITALS
DIASTOLIC BLOOD PRESSURE: 65 MMHG | HEART RATE: 78 BPM | BODY MASS INDEX: 27.96 KG/M2 | RESPIRATION RATE: 20 BRPM | OXYGEN SATURATION: 98 % | WEIGHT: 184.5 LBS | TEMPERATURE: 98 F | SYSTOLIC BLOOD PRESSURE: 112 MMHG | HEIGHT: 68 IN

## 2023-06-22 DIAGNOSIS — D50.0 IRON DEFICIENCY ANEMIA DUE TO CHRONIC BLOOD LOSS: Primary | ICD-10-CM

## 2023-06-22 PROCEDURE — 99215 PR OFFICE/OUTPT VISIT, EST, LEVL V, 40-54 MIN: ICD-10-PCS | Mod: ,,,

## 2023-06-22 PROCEDURE — 99215 OFFICE O/P EST HI 40 MIN: CPT | Mod: ,,,

## 2023-06-22 RX ORDER — PANTOPRAZOLE SODIUM 40 MG/1
40 TABLET, DELAYED RELEASE ORAL EVERY MORNING
COMMUNITY
Start: 2023-05-15

## 2023-06-22 RX ORDER — FERROUS SULFATE 325(65) MG
TABLET ORAL
Qty: 60 TABLET | Refills: 3 | Status: SHIPPED | OUTPATIENT
Start: 2023-06-22 | End: 2024-01-03

## 2023-06-22 RX ORDER — ASCORBIC ACID 500 MG
TABLET ORAL
Qty: 60 TABLET | Refills: 3 | Status: SHIPPED | OUTPATIENT
Start: 2023-06-22 | End: 2024-01-03 | Stop reason: SDUPTHER

## 2023-06-22 NOTE — PROGRESS NOTES
PCP: Dr. 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 Lost to follow up    Has previously received multiple courses of IV iron. Multiple PRBC transfusions.    In 6/2019 he had EGD with Dr Fajardo that showed hiatal hernia, schatzki's ring, esophagitis, gastric ulcer and gastritis.    Most recently presented to [facility] [place] ER 8/2019 with CP and found to have severe anemia with Hg of 3.2, FOBT+. He was admitted and received a total of 6u of PRBC. He had EGD with Dr Hilario and found to have a few benign sessile polyps in antrum and fundus. He also had colonoscopy with Dr Fajardo that showed colon polyp and internal and external hemorrhoids. 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 During admission he was noted to have thrombocytopenia as well w/ PLT count dropping to ~50k prior to discharge.    He was referred for outpatient capsule endoscopy which as per pt he had 9/2019, results not available.    Interval History  Today, 06/22/23,  Patient continues with intermittent symptoms of dark tarry stools.  He had a consult with GI several weeks ago and is scheduled for a colonoscopy 5/15/23. He does report sob.  He denies any chest pain or chest tightness.  He continues to take oral iron BID.    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, hemorrhagic gastritis  PSHx: PCA, cataract, appendectomy  Social Hx: no ETOH, drugs, or tobacco  Allergies: NKDA  Meds: reviewed    Family Hx: none pertinent    Labs:  CMP unremarkable, iron 115, iron sat 31%, ferritin 17, wbc 3.59, hgb 10.1, plt 187, ANC 2.07   05/09/23: CMP unremarkable, iron 67, iron sat 21%, ferritin 21, wbc  4.82, hgb 11.1, plt 203, ANC 3.01  03/07/23: cr 1.16, ca 8.7, iron 56, ironsat 20%, ferritin 97, wbc 3.94, hgb 10.0, plt 228, ANC 2.59  01/05/2023:  Creatinine 1.26, albumin 4.1, calcium 9.2, alkaline phosphatase 56, total bilirubin 0.2, AST 12, ALT 13, , iron 89, TIBC 418, % saturation 21, ferritin 14, folic acid 10.7, vitamin B12 983, WBC count 3.47, hemoglobin 9.3, MCV 90.5, platelet count 237.  9/8/22 Cr 1.18, Alb 3.9, , iron 56, TIBC 333, Ferritin 34, folic acid 10.87, B12 1872, WBC 3.46, Hgb 11.7,  MCV 93, , retic 2.13  5/6/22 Cr 1.33 Alb 3.9  Iron 77 TIBC 345 Ferritin 42 WBC 3.41 RBC 3.91 Hg 11.9 MCV 94.1 RDW 13.5  Retic 2.65%  1/17/22 Cr 1.24, Alb 4.1, Iron 57, TIBC 324, iron sat 18%, Ferritin 56, folic acid 9.29, B12 1799, WBC 3.60, Hgb 13.0, MCV  93.8,   9/20/21 Cr 1.25 Alb 4.3  Iron 75 TIBC 345 Ferritin 48 WBC 4.09 Hg 13.3   7/20/21 Cr 1.55, Alb 3.8, TP 6.8, iron 47, TIBC 334, Ferritin 30, WBC 3.71, Hgb 12.6, MCV 92.3, RDW 13.0, , retic 1.93%  6/7/21 Cr 1.16, Alb 4.1, TP 7.3, iron 52, TIBC 371, Ferritin 21, Folic acid 13.76, B12 >2000, WBC 3.74, Hgb 11.9, MCV 93.2, RDW 13.7, , retic 3.34  4/12/21 Cr 1.17 Alb 4.2 TP 7.6 Iron 55 TIBC 342. Ferritin 40 Folate 12.68 B12>2000 WBC 3.72 RBC 4.56 Hg 14 MCV 92.1 RDW 13.6  Retic 120k ANC 2.06  2/18/21 Glu 239, Cr 1.24, TP 7.1 Alb 4.0, Ca 9.1 Iron 86 TIBC 322 Iron sat 27% Ferritin 35 Folic acid 17.37, B12 1204, WBC 4.08, Hgb 13.1, , ANC  2.51, retic 2.43%  12/9/20 , CR 1.18, TP 6.5, ALB 3.7, calcium 8.6, iron 100, TIBC 295, iron sat 34%, ferritin 31, folic acid 15.54, B12 1187, WBC 3.64, Hg 12.3, , ANC 2.16, retic 1K  8/18/20 Cr 1.3 Alb 4 TP 6.9 AST 12 ALT 10 Iron 83 TIBC 295 Ferritin 50 Folate 14.91 B12 1671 WBC 4.46 Hg 12.6 RBC 4.13 MCV 95.6 RDW 14.1  Retic % 2.53 Abs retic 100k  3/25/20 Cr 1.13 iron 86 TIBC 278 Ferritin 119 Folate 16 B12 >2000 WBC 2.80 RBC 3.91  Hgb 12.2 MCV 97  retic 2.45% ANC 1.70  1/31/20 Cr 1.23 Alb 4.2 Iron 57 TIBC 285 Ferritin 158 WBC 3.31 Hg 13 RBC 4.16 MCV 97.1  Retic ~85k ANC 1.75  12/6/19 Iron 89, TIBC 288, iron sat 31% Ferritin 378, Folic acid 18.04, b12 1535, Hgb 12.4, , WBC 3.54, ANC 1.96  10/22/19 WBC 3.5 Hg 11.1 MCV 92 RDW 17.6  Ferritin 12 Iron 61 TIBC 378  9/25/19 Iron 55 TIBC 393 Ferritin 32 Folate 18.8 B12 1453  8/13/19 Hg 9.5 MCV 80.4 WBC 4.75 PLT 52k RBC 3.78  8/9/19 Hg 3.2 MCV 65 RBC 1.89 RDW 22.4 PLT 119k    Lab History:  Iron 114, ferritin 10-- Hgb 10.5-- 1/21/2014  Iron 48, ferritin 9, iron sat 12%, Hgb 9.6-- 3/25/2014  Iron 31, ferritin 6, iron sat 8%, Hgb 8.7--5/22/2014  Iron 60, ferritin 49, iron sat 20%, Hgb 12.7- TIBC  305- 7/23/2014  Iron 12, ferritin 5, iron sat 3%, Hgb 6.4 Hct 22.5 TIBC 388--12/22/2014.  Iron 11, TIBC 446, iron sat 2.5%, ferritin 4.8-- 5/28/2015  Iron 75, TIBC 317, iron sat 24%, ferritin 538-- 6/23/2015  Iron 71, TIBC 355, iron sat 20%, ferritin 17--9/03/2015  Iron 107, TIBC 285, iron sat 38%, ferritin 255-- 10/15/2015  Iron 11, TIBC 305, iron sat 4%, ferritin 6 -- 02/12/2016  Iron 126, TIBC 335, iron sat 38%, ferritin 18- 3/14/16  Retic 4%, Ferritin 18- 4/19/16  Ferritin 14, retic 3%- 5/10/16  Iron  141, TIBC 333 , Iron sat  42%, Ferritin 18,  Retic 2.66  -6/6/16  Iron 209, TIBC 360, iron sat 58%, ferritin 21 - 8/30/16  10/2016 Ferritin 14, iron 24, iron sat 7% -->Injectafer ordered 12/7/16 11/29/16 HGB 7.0--:> transfused  1/9/17  HGB 11.8, MCV 96, Ferritin 257  2/20/17 Ferritin 18, HGB 11.8  4/12/17 WBC 3.1, HGB 7.1, MCV 80, PLT 282K, Ferritin 6--> Injectafer 4/12/17 and 4/16/17 4/29/17 Ferritin 266  6/5/17 WBC 2.4, Hgb 7.4, MCV 87, , ferritin 10.--> Injectafer for 6/5/17 and 2 units packed red blood cells  7/5/17 WBC 8.1, Hgb 10.9, PLT 2:30 1K, MCV 96, ferritin 247  9/11/2017 WBC 2.8, Hgb 11.5, , ferritin 421  1/24/17 WBC 3.71, HGB 11.3, PLT 319K Ferritin  319  3/28/18 WBC 2.5, HGB 11.3, MCV 93, PLT 151K Ferritin 294    EGD 11/8/2010--diffuse hemorrhagic gastritis, dede negative  EGD 10/09/12 -- hemorrhagic gastritis  Colonoscopies done in 2004 and 2010 (2010 benign polyps found)        EGD 04/07/15 -- 4 bleeding AVMs in domingajeunum  EGD 6/2019 esophageal hiatal hernia, ring in GEJ, erythema in lower 3rd of esophagus, ulcer in antrum, gastritis. Normal duodenum. Repeat EGD 8/10/19 normal stomach and esophagus with few benign gastric polyps  Colonoscopy 8/13/19 with polyps, internal and external hemorrhoids       Imaging: None    Path:  8/9/19 sigmoid colon biopsy: polypoid colonic mucosa with benign lymphoid aggregate and reactive mucosal changes. no hyperplastic polyp, adenomatous change or malignancy    Review of Systems  CONSTITUTIONAL: no fevers, no chills, no weight loss, +mild chronic fatigue, no weakness  HEMATOLOGIC: no abnormal bleeding, no abnormal bruising, no drenching  night sweats  ONCOLOGIC: no new masses or lumps  HEENT: no vision loss, no tinnitus or hearing loss, no nose bleeding, no dysphagia, no odynophagia  CVS: no chest pain, no palpitations, +mild chronic dyspnea on exertion  RESP: + shortness of breath, no hemoptysis, no cough  GI: no nausea, no vomiting, no diarrhea, no constipation, no melena, no hematochezia, no hematemesis, no abdominal pain, no increase in abdominal girth  : no dysuria, no hematuria, no hesitancy, no scrotal swelling, no discharge  INTEGUMENT: no rashes, no abnormal bruising, no nail pitting, no hyperpigmentation  NEURO: no falls, no memory loss, no paresthesias or dysesthesias, no urofecal incontinence or retention, no loss of strength on any extremity  MSK: no back pain, no new joint pain, no joint swelling  PSYCH: no suicidal or homicidal ideation, no depression, no insomnia, no anhedonia  ENDOCRINE: no heat or cold intolerance, no polyuria, no polydipsia    Physical Exam  Vitals:    06/22/23 1314   BP: 112/65   Pulse: 78    Resp: 20   Temp: 97.9 °F (36.6 °C)          ECOG PS 1  GA: AAOx3, NAD  HEENT: NCAT, PERRLA, EOMI, no oral ulcers  LYMPH: no cervical, axillary or supraclavicular adenopathy  CVS: s1s2 RRR, no M/R/G  RESP: CTA b/l, expiratory wheeze (noted prior), no crackles or rhonchi  ABD: soft, NT, ND, BS+, no hepatosplenomegaly  EXT: no deformities, mild b/l pitting pedal edema  SKIN: no rashes, no bruises or purpura, warm and dry  NEURO: normal mentation, strength 5/5 on all 4 extremities, no sensory deficits     Assessment/Plan  1. Anemia due to iron deficiency D50.0  Given chronic CAD Hg should be >8/dl  MJ is due to recurrent GI blood loss.  Received IV Injectafer x 2 on 11/13/19 and 11/20/19 with appropriate response.  Continue to f/u with cardiology as recommended, C/w inhaler PRN SOB  Currently taking oral iron BID    2. Thrombocytopenia D69.6  Multifactorial. During hospitalization at least partially dilutional due to transfusion of 6 u of PRBC as well as consumptive due to ongoing bleeding. Also likely due to medications including lasix, PPI and h2 blockers  PLT count has now normalized      Plan   Reviewed labs, Normal iron level with deficient ferritin at 17. Hgb decreased from 11.1 to 10.1.   Continue oral iron BID to keep iron levels stable if possible  Records requested from colonoscopy with Dr Fajardo on 5/11/23  Referral placed to the [facility] for capsule endoscopy recommended by Dr. Fajardo (referral not sent)  Oral iron refill sent to pharmacy with vit C 500 mg tablets today  Follow-up in clinic in 8 weeks with repeat CBC, CMP, iron panel, ferritin      Instructed patient to contact clinic for questions or concerns.

## 2023-08-14 NOTE — PROGRESS NOTES
PCP: Dr. 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 Lost to follow up    Has previously received multiple courses of IV iron. Multiple PRBC transfusions.    In 6/2019 he had EGD with Dr Fajardo that showed hiatal hernia, schatzki's ring, esophagitis, gastric ulcer and gastritis.    Most recently presented to [facility] 8/2019 with CP and found to have severe anemia with Hg of 3.2, FOBT+. He was admitted and received a total of 6u of PRBC. He had EGD with Dr Hilario and found to have a few benign sessile polyps in antrum and fundus. He also had colonoscopy with Dr Fajardo that showed colon polyp and internal and external hemorrhoids. 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 During admission he was noted to have thrombocytopenia as well w/ PLT count dropping to ~50k prior to discharge.    He was referred for outpatient capsule endoscopy which as per pt he had 9/2019, results not available.    Interval History  Today, 08/15/23,  Patient continues with intermittent symptoms of dark tarry stools.  He had  a capsule endoscopy via the gastro clinic about 6 weeks ago, but patient did not get results of exam. He states Dr. Fajardo would like to repeat an EGD in a few weeks. He continues with ALVES. He denies any chest pain or chest tightness.  He continues to take oral iron BID.    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, hemorrhagic gastritis  PSHx: PCA, cataract, appendectomy  Social Hx: no ETOH, drugs, or tobacco  Allergies: NKDA  Meds: reviewed    Family Hx: none pertinent    Labs:  08/09/2023: CMP unremarkable, iron59, iron sat 16%, ferritin 14, wbc  3.21, hgb 9.2, plt 233, ANC 1.84  06/2023: CMP unremarkable, iron 115, iron sat 31%, ferritin 17, wbc 3.59, hgb 10.1, plt 187, ANC 2.07   05/09/23: CMP unremarkable, iron 67, iron sat 21%, ferritin 21, wbc 4.82, hgb 11.1, plt 203, ANC 3.01  03/07/23: cr 1.16, ca 8.7, iron 56, ironsat 20%, ferritin 97, wbc 3.94, hgb 10.0, plt 228, ANC 2.59  01/05/2023:  Creatinine 1.26, albumin 4.1, calcium 9.2, alkaline phosphatase 56, total bilirubin 0.2, AST 12, ALT 13, , iron 89, TIBC 418, % saturation 21, ferritin 14, folic acid 10.7, vitamin B12 983, WBC count 3.47, hemoglobin 9.3, MCV 90.5, platelet count 237.  9/8/22 Cr 1.18, Alb 3.9, , iron 56, TIBC 333, Ferritin 34, folic acid 10.87, B12 1872, WBC 3.46, Hgb 11.7,  MCV 93, , retic 2.13  5/6/22 Cr 1.33 Alb 3.9  Iron 77 TIBC 345 Ferritin 42 WBC 3.41 RBC 3.91 Hg 11.9 MCV 94.1 RDW 13.5  Retic 2.65%  1/17/22 Cr 1.24, Alb 4.1, Iron 57, TIBC 324, iron sat 18%, Ferritin 56, folic acid 9.29, B12 1799, WBC 3.60, Hgb 13.0, MCV  93.8,   9/20/21 Cr 1.25 Alb 4.3  Iron 75 TIBC 345 Ferritin 48 WBC 4.09 Hg 13.3   7/20/21 Cr 1.55, Alb 3.8, TP 6.8, iron 47, TIBC 334, Ferritin 30, WBC 3.71, Hgb 12.6, MCV 92.3, RDW 13.0, , retic 1.93%  6/7/21 Cr 1.16, Alb 4.1, TP 7.3, iron 52, TIBC 371, Ferritin 21, Folic acid 13.76, B12 >2000, WBC 3.74, Hgb 11.9, MCV 93.2, RDW 13.7, , retic 3.34  4/12/21 Cr 1.17 Alb 4.2 TP 7.6 Iron 55 TIBC 342. Ferritin 40 Folate 12.68 B12>2000 WBC 3.72 RBC 4.56 Hg 14 MCV 92.1 RDW 13.6  Retic 120k ANC 2.06  2/18/21 Glu 239, Cr 1.24, TP 7.1 Alb 4.0, Ca 9.1 Iron 86 TIBC 322 Iron sat 27% Ferritin 35 Folic acid 17.37, B12 1204, WBC 4.08, Hgb 13.1, , ANC  2.51, retic 2.43%  12/9/20 , CR 1.18, TP 6.5, ALB 3.7, calcium 8.6, iron 100, TIBC 295, iron sat 34%, ferritin 31, folic acid 15.54, B12 1187, WBC 3.64, Hg 12.3, , ANC 2.16, retic 1K  8/18/20 Cr 1.3 Alb 4 TP 6.9 AST 12 ALT 10 Iron 83  TIBC 295 Ferritin 50 Folate 14.91 B12 1671 WBC 4.46 Hg 12.6 RBC 4.13 MCV 95.6 RDW 14.1  Retic % 2.53 Abs retic 100k  3/25/20 Cr 1.13 iron 86 TIBC 278 Ferritin 119 Folate 16 B12 >2000 WBC 2.80 RBC 3.91 Hgb 12.2 MCV 97  retic 2.45% ANC 1.70  1/31/20 Cr 1.23 Alb 4.2 Iron 57 TIBC 285 Ferritin 158 WBC 3.31 Hg 13 RBC 4.16 MCV 97.1  Retic ~85k ANC 1.75  12/6/19 Iron 89, TIBC 288, iron sat 31% Ferritin 378, Folic acid 18.04, b12 1535, Hgb 12.4, , WBC 3.54, ANC 1.96  10/22/19 WBC 3.5 Hg 11.1 MCV 92 RDW 17.6  Ferritin 12 Iron 61 TIBC 378  9/25/19 Iron 55 TIBC 393 Ferritin 32 Folate 18.8 B12 1453  8/13/19 Hg 9.5 MCV 80.4 WBC 4.75 PLT 52k RBC 3.78  8/9/19 Hg 3.2 MCV 65 RBC 1.89 RDW 22.4 PLT 119k    Lab History:  Iron 114, ferritin 10-- Hgb 10.5-- 1/21/2014  Iron 48, ferritin 9, iron sat 12%, Hgb 9.6-- 3/25/2014  Iron 31, ferritin 6, iron sat 8%, Hgb 8.7--5/22/2014  Iron 60, ferritin 49, iron sat 20%, Hgb 12.7- TIBC  305- 7/23/2014  Iron 12, ferritin 5, iron sat 3%, Hgb 6.4 Hct 22.5 TIBC 388--12/22/2014.  Iron 11, TIBC 446, iron sat 2.5%, ferritin 4.8-- 5/28/2015  Iron 75, TIBC 317, iron sat 24%, ferritin 538-- 6/23/2015  Iron 71, TIBC 355, iron sat 20%, ferritin 17--9/03/2015  Iron 107, TIBC 285, iron sat 38%, ferritin 255-- 10/15/2015  Iron 11, TIBC 305, iron sat 4%, ferritin 6 -- 02/12/2016  Iron 126, TIBC 335, iron sat 38%, ferritin 18- 3/14/16  Retic 4%, Ferritin 18- 4/19/16  Ferritin 14, retic 3%- 5/10/16  Iron  141, TIBC 333 , Iron sat  42%, Ferritin 18,  Retic 2.66  -6/6/16  Iron 209, TIBC 360, iron sat 58%, ferritin 21 - 8/30/16  10/2016 Ferritin 14, iron 24, iron sat 7% -->Injectafer ordered 12/7/16 11/29/16 HGB 7.0--:> transfused  1/9/17  HGB 11.8, MCV 96, Ferritin 257  2/20/17 Ferritin 18, HGB 11.8  4/12/17 WBC 3.1, HGB 7.1, MCV 80, PLT 282K, Ferritin 6--> Injectafer 4/12/17 and 4/16/17 4/29/17 Ferritin 266  6/5/17 WBC 2.4, Hgb 7.4, MCV 87, , ferritin 10.-->  Injectafer for 6/5/17 and 2 units packed red blood cells  7/5/17 WBC 8.1, Hgb 10.9, PLT 2:30 1K, MCV 96, ferritin 247  9/11/2017 WBC 2.8, Hgb 11.5, , ferritin 421  1/24/17 WBC 3.71, HGB 11.3, PLT 319K Ferritin 319  3/28/18 WBC 2.5, HGB 11.3, MCV 93, PLT 151K Ferritin 294    EGD 11/8/2010--diffuse hemorrhagic gastritis, dede negative  EGD 10/09/12 -- hemorrhagic gastritis  Colonoscopies done in 2004 and 2010 (2010 benign polyps found)        EGD 04/07/15 -- 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 Normal duodenum. Repeat EGD 8/10/19 normal stomach and esophagus with few benign gastric polyps  Colonoscopy 8/13/19 with polyps, internal and external hemorrhoids       Imaging: None    Path:  8/9/19 sigmoid colon biopsy: polypoid colonic mucosa with benign lymphoid aggregate and reactive mucosal changes. no hyperplastic polyp, adenomatous change or malignancy    Review of Systems  CONSTITUTIONAL: no fevers, no chills, no weight loss, +mild chronic fatigue, no weakness  HEMATOLOGIC: no abnormal bleeding, no abnormal bruising, no drenching  night sweats  ONCOLOGIC: no new masses or lumps  HEENT: no vision loss, no tinnitus or hearing loss, no nose bleeding, no dysphagia, no odynophagia  CVS: no chest pain, no palpitations, +mild chronic dyspnea on exertion  RESP: + shortness of breath, no hemoptysis, no cough  GI: no nausea, no vomiting, no diarrhea, no constipation, no melena, no hematochezia, no hematemesis, no abdominal pain, no increase in abdominal girth  : no dysuria, no hematuria, no hesitancy, no scrotal swelling, no discharge  INTEGUMENT: no rashes, no abnormal bruising, no nail pitting, no hyperpigmentation  NEURO: no falls, no memory loss, no paresthesias or dysesthesias, no urofecal incontinence or retention, no loss of strength on any extremity  MSK: no back pain, no new joint pain, no joint  swelling  PSYCH: no suicidal or homicidal ideation, no depression, no insomnia, no anhedonia  ENDOCRINE: no heat or cold intolerance, no polyuria, no polydipsia    Physical Exam  Vitals:    08/15/23 1409   BP: (!) 121/58   Pulse: 61   Resp: 20   Temp: 97.9 °F (36.6 °C)          ECOG PS 1  GA: AAOx3, NAD  HEENT: NCAT, PERRLA, EOMI, no oral ulcers  LYMPH: no cervical, axillary or supraclavicular adenopathy  CVS: s1s2 RRR, no M/R/G  RESP: CTA b/l, expiratory wheeze (noted prior), no crackles or rhonchi  ABD: soft, NT, ND, BS+, no hepatosplenomegaly  EXT: no deformities, mild b/l pitting pedal edema  SKIN: no rashes, no bruises or purpura, warm and dry  NEURO: normal mentation, strength 5/5 on all 4 extremities, no sensory deficits     Assessment/Plan  1. Anemia due to iron deficiency D50.0  Given chronic CAD Hg should be >8/dl  MJ is due to recurrent GI blood loss.  Received IV Injectafer x 2 on 11/13/19 and 11/20/19 with appropriate response. IV iron also received 1/2023  Continue to f/u with cardiology as recommended, C/w inhaler PRN SOB  Currently taking oral iron BID    2. Thrombocytopenia D69.6  Multifactorial. During hospitalization at least partially dilutional due to transfusion of 6 u of PRBC as well as consumptive due to ongoing bleeding. Also likely due to medications including lasix, PPI and h2 blockers  PLT count has now normalized      Plan   Reviewed labs with worsening iron, ferritin, and hgb levels since last visit.  Ok to stop oral iron for now  Will replete iron levels, orders placed for Injectafer X 2 doses pending insurance approval  Continue with follow EGD via Dr. Fajardo.   [facility] from [facility]  Follow-up in clinic in 10 weeks with repeat CBC, CMP, iron panel, ferritin      Instructed patient to contact clinic for questions or concerns.

## 2023-08-15 ENCOUNTER — OFFICE VISIT (OUTPATIENT)
Dept: HEMATOLOGY/ONCOLOGY | Facility: CLINIC | Age: 83
End: 2023-08-15
Payer: MEDICARE

## 2023-08-15 VITALS
OXYGEN SATURATION: 98 % | RESPIRATION RATE: 20 BRPM | BODY MASS INDEX: 27.92 KG/M2 | HEART RATE: 61 BPM | DIASTOLIC BLOOD PRESSURE: 58 MMHG | TEMPERATURE: 98 F | HEIGHT: 68 IN | WEIGHT: 184.19 LBS | SYSTOLIC BLOOD PRESSURE: 121 MMHG

## 2023-08-15 DIAGNOSIS — D50.0 IRON DEFICIENCY ANEMIA DUE TO CHRONIC BLOOD LOSS: Primary | ICD-10-CM

## 2023-08-15 PROCEDURE — 99215 OFFICE O/P EST HI 40 MIN: CPT | Mod: ,,,

## 2023-08-15 PROCEDURE — 99215 PR OFFICE/OUTPT VISIT, EST, LEVL V, 40-54 MIN: ICD-10-PCS | Mod: ,,,

## 2023-08-15 RX ORDER — HEPARIN 100 UNIT/ML
5 SYRINGE INTRAVENOUS
OUTPATIENT
Start: 2023-08-16

## 2023-08-15 RX ORDER — DIPHENHYDRAMINE HYDROCHLORIDE 50 MG/ML
50 INJECTION INTRAMUSCULAR; INTRAVENOUS ONCE AS NEEDED
OUTPATIENT
Start: 2023-08-16

## 2023-08-15 RX ORDER — SODIUM CHLORIDE 0.9 % (FLUSH) 0.9 %
10 SYRINGE (ML) INJECTION
OUTPATIENT
Start: 2023-08-16

## 2023-08-15 RX ORDER — EPINEPHRINE 0.3 MG/.3ML
0.3 INJECTION SUBCUTANEOUS ONCE AS NEEDED
OUTPATIENT
Start: 2023-08-16

## 2023-08-15 RX ORDER — SODIUM CHLORIDE 9 MG/ML
INJECTION, SOLUTION INTRAVENOUS CONTINUOUS
OUTPATIENT
Start: 2023-08-16

## 2023-10-18 DIAGNOSIS — D50.0 IRON DEFICIENCY ANEMIA DUE TO CHRONIC BLOOD LOSS: Primary | ICD-10-CM

## 2023-10-24 ENCOUNTER — OFFICE VISIT (OUTPATIENT)
Dept: HEMATOLOGY/ONCOLOGY | Facility: CLINIC | Age: 83
End: 2023-10-24
Payer: MEDICARE

## 2023-10-24 VITALS
WEIGHT: 185 LBS | DIASTOLIC BLOOD PRESSURE: 68 MMHG | TEMPERATURE: 98 F | BODY MASS INDEX: 28.04 KG/M2 | SYSTOLIC BLOOD PRESSURE: 118 MMHG | HEIGHT: 68 IN | HEART RATE: 54 BPM | RESPIRATION RATE: 18 BRPM | OXYGEN SATURATION: 100 %

## 2023-10-24 DIAGNOSIS — D50.0 IRON DEFICIENCY ANEMIA DUE TO CHRONIC BLOOD LOSS: Primary | ICD-10-CM

## 2023-10-24 PROCEDURE — 99213 OFFICE O/P EST LOW 20 MIN: CPT | Mod: ,,, | Performed by: NURSE PRACTITIONER

## 2023-10-24 PROCEDURE — 99213 PR OFFICE/OUTPT VISIT, EST, LEVL III, 20-29 MIN: ICD-10-PCS | Mod: ,,, | Performed by: NURSE PRACTITIONER

## 2023-10-24 RX ORDER — FERROUS GLUCONATE 324(38)MG
324 TABLET ORAL
Qty: 90 TABLET | Refills: 3 | Status: SHIPPED | OUTPATIENT
Start: 2023-10-24 | End: 2024-01-03 | Stop reason: SDUPTHER

## 2023-10-24 NOTE — PROGRESS NOTES
PCP: Dr. 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 Lost to follow up    Has previously received multiple courses of IV iron. Multiple PRBC transfusions.    In 6/2019 he had EGD with Dr Fajardo that showed hiatal hernia, schatzki's ring, esophagitis, gastric ulcer and gastritis.    Most recently presented to [facility] 8/2019 with CP and found to have severe anemia with Hg of 3.2, FOBT+. He was admitted and received a total of 6u of PRBC. He had EGD with Dr Hilario and found to have a few benign sessile polyps in antrum and fundus. He also had colonoscopy with Dr Fajardo that showed colon polyp and internal and external hemorrhoids. 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 During admission he was noted to have thrombocytopenia as well w/ PLT count dropping to ~50k prior to discharge.    He was referred for outpatient capsule endoscopy which as per pt he had 9/2019, results not available.    Interval History:    10/24/2023:  Mr. Morgan is here today for his follow-up and labs regarding anemia with chronic blood loss.  He denies any headaches, dizziness, lightheadedness, fatigue, SOB, chest palpitations, chest pain, abnormal bleeding, no melena, PICA, leg cramps, numbness and tingling of the extremities.   We discussed his capsule endoscopy report dated 7/12/2023:  Multiple small nonbleeding AVMs in the distal duodenum and proximal jejunum and 1 actively oozing AVM in the proximal.  We reviewed labs dated 10/18/2023:  WBC 4.00, Hgb 13.2, Hct 41.0, Plt 170,000, Na+ 135, K+ 5.1, glucose 224 (non-fasting), creatinine 1.13, TIBC 293, Ferritin 168.  We discussed restarting oral  iron, as AVM's are known to rupture any time, which cause chronic bleeding with iron loss.  No recent hospitalizations, infections, or illnesses.    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, hemorrhagic gastritis  PSHx: PCA, cataract, appendectomy  Social Hx: no ETOH, drugs, or tobacco  Allergies: NKDA  Meds: reviewed    Family Hx: none pertinent    Labs:    10/    08/09/2023: CMP unremarkable, iron59, iron sat 16%, ferritin 14, wbc 3.21, hgb 9.2, plt 233, ANC 1.84  06/2023: CMP unremarkable, iron 115, iron sat 31%, ferritin 17, wbc 3.59, hgb 10.1, plt 187, ANC 2.07   05/09/23: CMP unremarkable, iron 67, iron sat 21%, ferritin 21, wbc 4.82, hgb 11.1, plt 203, ANC 3.01  03/07/23: cr 1.16, ca 8.7, iron 56, ironsat 20%, ferritin 97, wbc 3.94, hgb 10.0, plt 228, ANC 2.59  01/05/2023:  Creatinine 1.26, albumin 4.1, calcium 9.2, alkaline phosphatase 56, total bilirubin 0.2, AST 12, ALT 13, , iron 89, TIBC 418, % saturation 21, ferritin 14, folic acid 10.7, vitamin B12 983, WBC count 3.47, hemoglobin 9.3, MCV 90.5, platelet count 237.  9/8/22 Cr 1.18, Alb 3.9, , iron 56, TIBC 333, Ferritin 34, folic acid 10.87, B12 1872, WBC 3.46, Hgb 11.7,  MCV 93, , retic 2.13  5/6/22 Cr 1.33 Alb 3.9  Iron 77 TIBC 345 Ferritin 42 WBC 3.41 RBC 3.91 Hg 11.9 MCV 94.1 RDW 13.5  Retic 2.65%  1/17/22 Cr 1.24, Alb 4.1, Iron 57, TIBC 324, iron sat 18%, Ferritin 56, folic acid 9.29, B12 1799, WBC 3.60, Hgb 13.0, MCV  93.8,   9/20/21 Cr 1.25 Alb 4.3  Iron 75 TIBC 345 Ferritin 48 WBC 4.09 Hg 13.3   7/20/21 Cr 1.55, Alb 3.8, TP 6.8, iron 47, TIBC 334, Ferritin 30, WBC 3.71, Hgb 12.6, MCV 92.3, RDW 13.0, , retic 1.93%  6/7/21 Cr 1.16, Alb 4.1, TP 7.3, iron 52, TIBC 371, Ferritin 21, Folic acid 13.76, B12 >2000, WBC 3.74, Hgb 11.9, MCV 93.2, RDW 13.7, , retic 3.34  4/12/21 Cr 1.17 Alb 4.2 TP 7.6 Iron 55 TIBC 342. Ferritin 40 Folate 12.68 B12>2000 WBC  3.72 RBC 4.56 Hg 14 MCV 92.1 RDW 13.6  Retic 120k ANC 2.06  2/18/21 Glu 239, Cr 1.24, TP 7.1 Alb 4.0, Ca 9.1 Iron 86 TIBC 322 Iron sat 27% Ferritin 35 Folic acid 17.37, B12 1204, WBC 4.08, Hgb 13.1, , ANC  2.51, retic 2.43%  12/9/20 , CR 1.18, TP 6.5, ALB 3.7, calcium 8.6, iron 100, TIBC 295, iron sat 34%, ferritin 31, folic acid 15.54, B12 1187, WBC 3.64, Hg 12.3, , ANC 2.16, retic 1K  8/18/20 Cr 1.3 Alb 4 TP 6.9 AST 12 ALT 10 Iron 83 TIBC 295 Ferritin 50 Folate 14.91 B12 1671 WBC 4.46 Hg 12.6 RBC 4.13 MCV 95.6 RDW 14.1  Retic % 2.53 Abs retic 100k  3/25/20 Cr 1.13 iron 86 TIBC 278 Ferritin 119 Folate 16 B12 >2000 WBC 2.80 RBC 3.91 Hgb 12.2 MCV 97  retic 2.45% ANC 1.70  1/31/20 Cr 1.23 Alb 4.2 Iron 57 TIBC 285 Ferritin 158 WBC 3.31 Hg 13 RBC 4.16 MCV 97.1  Retic ~85k ANC 1.75  12/6/19 Iron 89, TIBC 288, iron sat 31% Ferritin 378, Folic acid 18.04, b12 1535, Hgb 12.4, , WBC 3.54, ANC 1.96  10/22/19 WBC 3.5 Hg 11.1 MCV 92 RDW 17.6  Ferritin 12 Iron 61 TIBC 378  9/25/19 Iron 55 TIBC 393 Ferritin 32 Folate 18.8 B12 1453  8/13/19 Hg 9.5 MCV 80.4 WBC 4.75 PLT 52k RBC 3.78  8/9/19 Hg 3.2 MCV 65 RBC 1.89 RDW 22.4 PLT 119k    Lab History:  Iron 114, ferritin 10-- Hgb 10.5-- 1/21/2014  Iron 48, ferritin 9, iron sat 12%, Hgb 9.6-- 3/25/2014  Iron 31, ferritin 6, iron sat 8%, Hgb 8.7--5/22/2014  Iron 60, ferritin 49, iron sat 20%, Hgb 12.7- TIBC  305- 7/23/2014  Iron 12, ferritin 5, iron sat 3%, Hgb 6.4 Hct 22.5 TIBC 388--12/22/2014.  Iron 11, TIBC 446, iron sat 2.5%, ferritin 4.8-- 5/28/2015  Iron 75, TIBC 317, iron sat 24%, ferritin 538-- 6/23/2015  Iron 71, TIBC 355, iron sat 20%, ferritin 17--9/03/2015  Iron 107, TIBC 285, iron sat 38%, ferritin 255-- 10/15/2015  Iron 11, TIBC 305, iron sat 4%, ferritin 6 -- 02/12/2016  Iron 126, TIBC 335, iron sat 38%, ferritin 18- 3/14/16  Retic 4%, Ferritin 18- 4/19/16  Ferritin 14, retic 3%- 5/10/16  Iron  141, TIBC 333 ,  Iron sat  42%, Ferritin 18,  Retic 2.66  -6/6/16  Iron 209, TIBC 360, iron sat 58%, ferritin 21 - 8/30/16  10/2016 Ferritin 14, iron 24, iron sat 7% -->Injectafer ordered 12/7/16 11/29/16 HGB 7.0--:> transfused  1/9/17  HGB 11.8, MCV 96, Ferritin 257  2/20/17 Ferritin 18, HGB 11.8  4/12/17 WBC 3.1, HGB 7.1, MCV 80, PLT 282K, Ferritin 6--> Injectafer 4/12/17 and 4/16/17 4/29/17 Ferritin 266  6/5/17 WBC 2.4, Hgb 7.4, MCV 87, , ferritin 10.--> Injectafer for 6/5/17 and 2 units packed red blood cells  7/5/17 WBC 8.1, Hgb 10.9, PLT 2:30 1K, MCV 96, ferritin 247  9/11/2017 WBC 2.8, Hgb 11.5, , ferritin 421  1/24/17 WBC 3.71, HGB 11.3, PLT 319K Ferritin 319  3/28/18 WBC 2.5, HGB 11.3, MCV 93, PLT 151K Ferritin 294  10/18/2023:  WBC 4.00, Hgb 13.2, Hct 41.0, Plt 170,000, Na+ 135, K+ 5.1, glucose 224 (non-fasting), creatinine 1.13, TIBC 293, Ferritin 168.     Procedures:  Capsule endoscopy report dated 7/12/2023:  Multiple small nonbleeding AVMs in the distal duodenum and proximal jejunum and 1 actively oozing AVM in the proximal.   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 Normal duodenum. Repeat EGD 8/10/19 normal stomach and esophagus with few benign gastric polyps  Colonoscopy 8/13/19 with polyps, internal and external hemorrhoids       Imaging: None    Path:  8/9/19 sigmoid colon biopsy: polypoid colonic mucosa with benign lymphoid aggregate and reactive mucosal changes. no hyperplastic polyp, adenomatous change or malignancy    Review of Systems  CONSTITUTIONAL: no fevers, no chills, no weight loss, +mild chronic fatigue, no weakness  HEMATOLOGIC: no abnormal bleeding, no abnormal bruising, no drenching  night  sweats  ONCOLOGIC: no new masses or lumps  HEENT: no vision loss, no tinnitus or hearing loss, no nose bleeding, no dysphagia, no odynophagia  CVS: no chest pain, no palpitations, +mild chronic dyspnea on exertion  RESP: + shortness of breath, no hemoptysis, no cough  GI: no nausea, no vomiting, no diarrhea, no constipation, no melena, no hematochezia, no hematemesis, no abdominal pain, no increase in abdominal girth  : no dysuria, no hematuria, no hesitancy, no scrotal swelling, no discharge  INTEGUMENT: no rashes, no abnormal bruising, no nail pitting, no hyperpigmentation  NEURO: no falls, no memory loss, no paresthesias or dysesthesias, no urofecal incontinence or retention, no loss of strength on any extremity  MSK: no back pain, no new joint pain, no joint swelling  PSYCH: no suicidal or homicidal ideation, no depression, no insomnia, no anhedonia  ENDOCRINE: no heat or cold intolerance, no polyuria, no polydipsia    Physical Exam  Vitals:    10/24/23 1109   BP: 118/68   Pulse: (!) 54   Resp: 18   Temp: 98.2 °F (36.8 °C)            ECOG PS 1  GA: AAOx3, NAD  HEENT: NCAT, PERRLA, EOMI, no oral ulcers  LYMPH: no cervical, axillary or supraclavicular adenopathy  CVS: s1s2 RRR, no M/R/G  RESP: CTA b/l, expiratory wheeze (noted prior), no crackles or rhonchi  ABD: soft, NT, ND, BS+, no hepatosplenomegaly  EXT: no deformities, mild b/l pitting pedal edema  SKIN: no rashes, no bruises or purpura, warm and dry  NEURO: normal mentation, strength 5/5 on all 4 extremities, no sensory deficits     Assessment/Plan  1. Anemia due to iron deficiency D50.0  Given chronic CAD Hg should be >8/dl  MJ is due to recurrent GI blood loss.  Received IV Injectafer x 2 on 11/13/19 and 11/20/19 with appropriate response. IV iron also received 1/2023  Continue to f/u with cardiology as recommended, C/w inhaler PRN SOB  Currently taking oral iron BID    10/24/2023 Assessment:    Patient doing well by his report.  No s/s of MJ.  No  melena.  Capsule endoscopy with AVM's, one actively bleeding.  Labs Stable.    2. Thrombocytopenia D69.6  Multifactorial. During hospitalization at least partially dilutional due to transfusion of 6 u of PRBC as well as consumptive due to ongoing bleeding. Also likely due to medications including lasix, PPI and h2 blockers  PLT count has now normalized      Plan   Will send prescription for Ferrous Gluconate 324 mg PO QD.  Continue to follow with Dr. Fajardo, GI.   Follow-up in clinic in 3 months for office visit and repeat CBC, CMP, iron panel, ferritin.      The patient is in agreement with today's plan of care.  All questions answered.  Patient is aware to contact our office for any problems or concerns prior to next visit.      Sarita Bernal, MSN-ED, APRN, AGACNP-BC, OCN

## 2024-01-03 ENCOUNTER — OFFICE VISIT (OUTPATIENT)
Dept: HEMATOLOGY/ONCOLOGY | Facility: CLINIC | Age: 84
End: 2024-01-03
Payer: MEDICARE

## 2024-01-03 VITALS
BODY MASS INDEX: 27.81 KG/M2 | WEIGHT: 183.5 LBS | HEIGHT: 68 IN | HEART RATE: 63 BPM | RESPIRATION RATE: 20 BRPM | TEMPERATURE: 98 F | DIASTOLIC BLOOD PRESSURE: 68 MMHG | OXYGEN SATURATION: 99 % | SYSTOLIC BLOOD PRESSURE: 144 MMHG

## 2024-01-03 DIAGNOSIS — D50.0 IRON DEFICIENCY ANEMIA DUE TO CHRONIC BLOOD LOSS: Primary | ICD-10-CM

## 2024-01-03 PROCEDURE — 99214 OFFICE O/P EST MOD 30 MIN: CPT | Mod: ,,,

## 2024-01-03 RX ORDER — ASCORBIC ACID 500 MG
TABLET ORAL
Qty: 60 TABLET | Refills: 6 | Status: SHIPPED | OUTPATIENT
Start: 2024-01-03

## 2024-01-03 RX ORDER — FERROUS GLUCONATE 324(38)MG
324 TABLET ORAL 2 TIMES DAILY
Qty: 60 TABLET | Refills: 6 | Status: SHIPPED | OUTPATIENT
Start: 2024-01-03

## 2024-01-03 RX ORDER — LEVOCETIRIZINE DIHYDROCHLORIDE 5 MG/1
5 TABLET, FILM COATED ORAL
COMMUNITY
Start: 2023-12-06

## 2024-01-03 RX ORDER — RANOLAZINE 1000 MG/1
1000 TABLET, EXTENDED RELEASE ORAL 2 TIMES DAILY
COMMUNITY

## 2024-01-03 NOTE — PROGRESS NOTES
PCP: Dr. 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 Lost to follow up    Has previously received multiple courses of IV iron. Multiple PRBC transfusions.    In 6/2019 he had EGD with Dr Fajardo that showed hiatal hernia, schatzki's ring, esophagitis, gastric ulcer and gastritis.    Most recently presented to [facility] 8/2019 with CP and found to have severe anemia with Hg of 3.2, FOBT+. He was admitted and received a total of 6u of PRBC. He had EGD with Dr Hilario and found to have a few benign sessile polyps in antrum and fundus. He also had colonoscopy with Dr Fajardo that showed colon polyp and internal and external hemorrhoids. 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 During admission he was noted to have thrombocytopenia as well w/ PLT count dropping to ~50k prior to discharge.    He was referred for outpatient capsule endoscopy which as per pt he had 9/2019, results not available.    Interval History:    01/03/2024:  Mr. Morgan is here today for his follow-up and labs regarding anemia with chronic blood loss. He does complain of fatigue but admits he has not been sleeping well.  He denies any headaches, dizziness, lightheadedness, SOB, chest palpitations, chest pain, abnormal bleeding, no melena, PICA, leg cramps, numbness and tingling of the extremities.  He is currently taking ferrous gluconate daily. No recent hospitalizations, infections, or illnesses.    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, hemorrhagic gastritis  PSHx: PCA, cataract, appendectomy  Social Hx: no ETOH, drugs, or tobacco  Allergies: NKDA  Meds: reviewed    Family  Hx: none pertinent    Labs:    12/26/23: iron 52, iron sat 16, ferritin 61, wbc 3.50, hgb 12.4, plt 190, ANC 1.88  10/18/23: iron 74, ferritin 168, wbc 4.00, hgb 13.2, plt 170  08/09/2023: CMP unremarkable, iron59, iron sat 16%, ferritin 14, wbc 3.21, hgb 9.2, plt 233, ANC 1.84  06/2023: CMP unremarkable, iron 115, iron sat 31%, ferritin 17, wbc 3.59, hgb 10.1, plt 187, ANC 2.07   05/09/23: CMP unremarkable, iron 67, iron sat 21%, ferritin 21, wbc 4.82, hgb 11.1, plt 203, ANC 3.01  03/07/23: cr 1.16, ca 8.7, iron 56, ironsat 20%, ferritin 97, wbc 3.94, hgb 10.0, plt 228, ANC 2.59  01/05/2023:  Creatinine 1.26, albumin 4.1, calcium 9.2, alkaline phosphatase 56, total bilirubin 0.2, AST 12, ALT 13, , iron 89, TIBC 418, % saturation 21, ferritin 14, folic acid 10.7, vitamin B12 983, WBC count 3.47, hemoglobin 9.3, MCV 90.5, platelet count 237.  9/8/22 Cr 1.18, Alb 3.9, , iron 56, TIBC 333, Ferritin 34, folic acid 10.87, B12 1872, WBC 3.46, Hgb 11.7,  MCV 93, , retic 2.13  5/6/22 Cr 1.33 Alb 3.9  Iron 77 TIBC 345 Ferritin 42 WBC 3.41 RBC 3.91 Hg 11.9 MCV 94.1 RDW 13.5  Retic 2.65%  1/17/22 Cr 1.24, Alb 4.1, Iron 57, TIBC 324, iron sat 18%, Ferritin 56, folic acid 9.29, B12 1799, WBC 3.60, Hgb 13.0, MCV  93.8,   9/20/21 Cr 1.25 Alb 4.3  Iron 75 TIBC 345 Ferritin 48 WBC 4.09 Hg 13.3   7/20/21 Cr 1.55, Alb 3.8, TP 6.8, iron 47, TIBC 334, Ferritin 30, WBC 3.71, Hgb 12.6, MCV 92.3, RDW 13.0, , retic 1.93%  6/7/21 Cr 1.16, Alb 4.1, TP 7.3, iron 52, TIBC 371, Ferritin 21, Folic acid 13.76, B12 >2000, WBC 3.74, Hgb 11.9, MCV 93.2, RDW 13.7, , retic 3.34  4/12/21 Cr 1.17 Alb 4.2 TP 7.6 Iron 55 TIBC 342. Ferritin 40 Folate 12.68 B12>2000 WBC 3.72 RBC 4.56 Hg 14 MCV 92.1 RDW 13.6  Retic 120k ANC 2.06  2/18/21 Glu 239, Cr 1.24, TP 7.1 Alb 4.0, Ca 9.1 Iron 86 TIBC 322 Iron sat 27% Ferritin 35 Folic acid 17.37, B12 1204, WBC 4.08, Hgb 13.1, , ANC   2.51, retic 2.43%  12/9/20 , CR 1.18, TP 6.5, ALB 3.7, calcium 8.6, iron 100, TIBC 295, iron sat 34%, ferritin 31, folic acid 15.54, B12 1187, WBC 3.64, Hg 12.3, , ANC 2.16, retic 1K  8/18/20 Cr 1.3 Alb 4 TP 6.9 AST 12 ALT 10 Iron 83 TIBC 295 Ferritin 50 Folate 14.91 B12 1671 WBC 4.46 Hg 12.6 RBC 4.13 MCV 95.6 RDW 14.1  Retic % 2.53 Abs retic 100k  3/25/20 Cr 1.13 iron 86 TIBC 278 Ferritin 119 Folate 16 B12 >2000 WBC 2.80 RBC 3.91 Hgb 12.2 MCV 97  retic 2.45% ANC 1.70  1/31/20 Cr 1.23 Alb 4.2 Iron 57 TIBC 285 Ferritin 158 WBC 3.31 Hg 13 RBC 4.16 MCV 97.1  Retic ~85k ANC 1.75  12/6/19 Iron 89, TIBC 288, iron sat 31% Ferritin 378, Folic acid 18.04, b12 1535, Hgb 12.4, , WBC 3.54, ANC 1.96  10/22/19 WBC 3.5 Hg 11.1 MCV 92 RDW 17.6  Ferritin 12 Iron 61 TIBC 378  9/25/19 Iron 55 TIBC 393 Ferritin 32 Folate 18.8 B12 1453  8/13/19 Hg 9.5 MCV 80.4 WBC 4.75 PLT 52k RBC 3.78  8/9/19 Hg 3.2 MCV 65 RBC 1.89 RDW 22.4 PLT 119k    Lab History:  Iron 114, ferritin 10-- Hgb 10.5-- 1/21/2014  Iron 48, ferritin 9, iron sat 12%, Hgb 9.6-- 3/25/2014  Iron 31, ferritin 6, iron sat 8%, Hgb 8.7--5/22/2014  Iron 60, ferritin 49, iron sat 20%, Hgb 12.7- TIBC  305- 7/23/2014  Iron 12, ferritin 5, iron sat 3%, Hgb 6.4 Hct 22.5 TIBC 388--12/22/2014.  Iron 11, TIBC 446, iron sat 2.5%, ferritin 4.8-- 5/28/2015  Iron 75, TIBC 317, iron sat 24%, ferritin 538-- 6/23/2015  Iron 71, TIBC 355, iron sat 20%, ferritin 17--9/03/2015  Iron 107, TIBC 285, iron sat 38%, ferritin 255-- 10/15/2015  Iron 11, TIBC 305, iron sat 4%, ferritin 6 -- 02/12/2016  Iron 126, TIBC 335, iron sat 38%, ferritin 18- 3/14/16  Retic 4%, Ferritin 18- 4/19/16  Ferritin 14, retic 3%- 5/10/16  Iron  141, TIBC 333 , Iron sat  42%, Ferritin 18,  Retic 2.66  -6/6/16  Iron 209, TIBC 360, iron sat 58%, ferritin 21 - 8/30/16  10/2016 Ferritin 14, iron 24, iron sat 7% -->Injectafer ordered 12/7/16 11/29/16 HGB 7.0--:>  transfused  1/9/17  HGB 11.8, MCV 96, Ferritin 257  2/20/17 Ferritin 18, HGB 11.8  4/12/17 WBC 3.1, HGB 7.1, MCV 80, PLT 282K, Ferritin 6--> Injectafer 4/12/17 and 4/16/17 4/29/17 Ferritin 266  6/5/17 WBC 2.4, Hgb 7.4, MCV 87, , ferritin 10.--> Injectafer for 6/5/17 and 2 units packed red blood cells  7/5/17 WBC 8.1, Hgb 10.9, PLT 2:30 1K, MCV 96, ferritin 247  9/11/2017 WBC 2.8, Hgb 11.5, , ferritin 421  1/24/17 WBC 3.71, HGB 11.3, PLT 319K Ferritin 319  3/28/18 WBC 2.5, HGB 11.3, MCV 93, PLT 151K Ferritin 294  10/18/2023:  WBC 4.00, Hgb 13.2, Hct 41.0, Plt 170,000, Na+ 135, K+ 5.1, glucose 224 (non-fasting), creatinine 1.13, TIBC 293, Ferritin 168.     Procedures:  Capsule endoscopy report dated 7/12/2023:  Multiple small nonbleeding AVMs in the distal duodenum and proximal jejunum and 1 actively oozing AVM in the proximal.   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 Normal duodenum. Repeat EGD 8/10/19 normal stomach and esophagus with few benign gastric polyps  Colonoscopy 8/13/19 with polyps, internal and external hemorrhoids       Imaging: None    Path:  8/9/19 sigmoid colon biopsy: polypoid colonic mucosa with benign lymphoid aggregate and reactive mucosal changes. no hyperplastic polyp, adenomatous change or malignancy    Review of Systems  CONSTITUTIONAL: no fevers, no chills, no weight loss, +mild chronic fatigue, no weakness  HEMATOLOGIC: no abnormal bleeding, no abnormal bruising, no drenching  night sweats  ONCOLOGIC: no new masses or lumps  HEENT: no vision loss, no tinnitus or hearing loss, no nose bleeding, no dysphagia, no odynophagia  CVS: no chest pain, no palpitations, +mild chronic dyspnea on  exertion  RESP: + shortness of breath, no hemoptysis, no cough  GI: no nausea, no vomiting, no diarrhea, no constipation, no melena, no hematochezia, no hematemesis, no abdominal pain, no increase in abdominal girth  : no dysuria, no hematuria, no hesitancy, no scrotal swelling, no discharge  INTEGUMENT: no rashes, no abnormal bruising, no nail pitting, no hyperpigmentation  NEURO: no falls, no memory loss, no paresthesias or dysesthesias, no urofecal incontinence or retention, no loss of strength on any extremity  MSK: no back pain, no new joint pain, no joint swelling  PSYCH: no suicidal or homicidal ideation, no depression, no insomnia, no anhedonia  ENDOCRINE: no heat or cold intolerance, no polyuria, no polydipsia    Physical Exam  Vitals:    01/03/24 1343   BP: (!) 144/68   Pulse: 63   Resp: 20   Temp: 97.8 °F (36.6 °C)            ECOG PS 1  GA: AAOx3, NAD  HEENT: NCAT, PERRLA, EOMI, no oral ulcers  LYMPH: no cervical, axillary or supraclavicular adenopathy  CVS: s1s2 RRR, no M/R/G  RESP: CTA b/l, expiratory wheeze (noted prior), no crackles or rhonchi  ABD: soft, NT, ND, BS+, no hepatosplenomegaly  EXT: no deformities, mild b/l pitting pedal edema  SKIN: no rashes, no bruises or purpura, warm and dry  NEURO: normal mentation, strength 5/5 on all 4 extremities, no sensory deficits     Assessment/Plan  1. Anemia due to iron deficiency D50.0  Given chronic CAD Hg should be >8/dl  MJ is due to recurrent GI blood loss.  Received IV Injectafer x 2 on 11/13/19 and 11/20/19 with appropriate response. IV iron also received 1/2023  Continue to f/u with cardiology as recommended, C/w inhaler PRN SOB  Currently taking oral iron once daily. Will change from once daily to BID. Recommend take 2 hours after eating with vit C.       2. Thrombocytopenia D69.6  Multifactorial. During hospitalization at least partially dilutional due to transfusion of 6 u of PRBC as well as consumptive due to ongoing bleeding. Also likely due  to medications including lasix, PPI and h2 blockers  PLT count has now normalized      Plan   Will send prescription for Ferrous Gluconate 324 mg PO BID. Take 2 hours after eating  Ascorbic acid 500 mg refilled also take with oral iron to improve absorption.   Continue to follow with Dr. Fajardo, GI.   Follow-up in clinic in 3 months for office visit and repeat CBC, CMP, iron panel, ferritin.      The patient is in agreement with today's plan of care.  All questions answered.  Patient is aware to contact our office for any problems or concerns prior to next visit.

## 2024-04-03 ENCOUNTER — OFFICE VISIT (OUTPATIENT)
Dept: HEMATOLOGY/ONCOLOGY | Facility: CLINIC | Age: 84
End: 2024-04-03
Payer: MEDICARE

## 2024-04-03 ENCOUNTER — TELEPHONE (OUTPATIENT)
Dept: HEMATOLOGY/ONCOLOGY | Facility: CLINIC | Age: 84
End: 2024-04-03

## 2024-04-03 VITALS
WEIGHT: 184.69 LBS | DIASTOLIC BLOOD PRESSURE: 67 MMHG | RESPIRATION RATE: 20 BRPM | SYSTOLIC BLOOD PRESSURE: 128 MMHG | OXYGEN SATURATION: 99 % | HEIGHT: 68 IN | BODY MASS INDEX: 27.99 KG/M2 | TEMPERATURE: 98 F | HEART RATE: 62 BPM

## 2024-04-03 DIAGNOSIS — D50.0 IRON DEFICIENCY ANEMIA DUE TO CHRONIC BLOOD LOSS: Primary | ICD-10-CM

## 2024-04-03 PROCEDURE — 99214 OFFICE O/P EST MOD 30 MIN: CPT | Mod: ,,,

## 2024-04-03 NOTE — PROGRESS NOTES
PCP: Dr. 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 Lost to follow up    Has previously received multiple courses of IV iron. Multiple PRBC transfusions.    In 6/2019 he had EGD with Dr Fajardo that showed hiatal hernia, schatzki's ring, esophagitis, gastric ulcer and gastritis.    Most recently presented to [facility] 8/2019 with CP and found to have severe anemia with Hg of 3.2, FOBT+. He was admitted and received a total of 6u of PRBC. He had EGD with Dr Hilario and found to have a few benign sessile polyps in antrum and fundus. He also had colonoscopy with Dr Fajardo that showed colon polyp and internal and external hemorrhoids. 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 During admission he was noted to have thrombocytopenia as well w/ PLT count dropping to ~50k prior to discharge.    He was referred for outpatient capsule endoscopy which as per pt he had 9/2019, results not available.    Interval History:    04/03/2024:  Mr. Morgan is here today for his follow-up and labs regarding anemia with chronic blood loss. He does report increased fatigue lately.  He denies any headaches, dizziness, lightheadedness, SOB, chest palpitations, chest pain, abnormal bleeding, no melena, PICA, leg cramps, numbness and tingling of the extremities.  He is compliant with ferrous gluconate BID daily. No recent hospitalizations, infections, or illnesses.    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, hemorrhagic gastritis  PSHx: PCA, cataract, appendectomy  Social Hx: no ETOH, drugs, or tobacco  Allergies: NKDA  Meds: reviewed    Family Hx: none  pertinent    Labs:    03/26/24: CMP unremarkable, iron 71, auqlotq14%, ferritin 30, wbc 3.79, hgb 11.3, plt 196,   12/26/23: iron 52, iron sat 16, ferritin 61, wbc 3.50, hgb 12.4, plt 190, ANC 1.88  10/18/23: iron 74, ferritin 168, wbc 4.00, hgb 13.2, plt 170  08/09/2023: CMP unremarkable, iron59, iron sat 16%, ferritin 14, wbc 3.21, hgb 9.2, plt 233, ANC 1.84  06/2023: CMP unremarkable, iron 115, iron sat 31%, ferritin 17, wbc 3.59, hgb 10.1, plt 187, ANC 2.07   05/09/23: CMP unremarkable, iron 67, iron sat 21%, ferritin 21, wbc 4.82, hgb 11.1, plt 203, ANC 3.01  03/07/23: cr 1.16, ca 8.7, iron 56, ironsat 20%, ferritin 97, wbc 3.94, hgb 10.0, plt 228, ANC 2.59  01/05/2023:  Creatinine 1.26, albumin 4.1, calcium 9.2, alkaline phosphatase 56, total bilirubin 0.2, AST 12, ALT 13, , iron 89, TIBC 418, % saturation 21, ferritin 14, folic acid 10.7, vitamin B12 983, WBC count 3.47, hemoglobin 9.3, MCV 90.5, platelet count 237.  9/8/22 Cr 1.18, Alb 3.9, , iron 56, TIBC 333, Ferritin 34, folic acid 10.87, B12 1872, WBC 3.46, Hgb 11.7,  MCV 93, , retic 2.13  5/6/22 Cr 1.33 Alb 3.9  Iron 77 TIBC 345 Ferritin 42 WBC 3.41 RBC 3.91 Hg 11.9 MCV 94.1 RDW 13.5  Retic 2.65%  1/17/22 Cr 1.24, Alb 4.1, Iron 57, TIBC 324, iron sat 18%, Ferritin 56, folic acid 9.29, B12 1799, WBC 3.60, Hgb 13.0, MCV  93.8,   9/20/21 Cr 1.25 Alb 4.3  Iron 75 TIBC 345 Ferritin 48 WBC 4.09 Hg 13.3   7/20/21 Cr 1.55, Alb 3.8, TP 6.8, iron 47, TIBC 334, Ferritin 30, WBC 3.71, Hgb 12.6, MCV 92.3, RDW 13.0, , retic 1.93%  6/7/21 Cr 1.16, Alb 4.1, TP 7.3, iron 52, TIBC 371, Ferritin 21, Folic acid 13.76, B12 >2000, WBC 3.74, Hgb 11.9, MCV 93.2, RDW 13.7, , retic 3.34  4/12/21 Cr 1.17 Alb 4.2 TP 7.6 Iron 55 TIBC 342. Ferritin 40 Folate 12.68 B12>2000 WBC 3.72 RBC 4.56 Hg 14 MCV 92.1 RDW 13.6  Retic 120k ANC 2.06  2/18/21 Glu 239, Cr 1.24, TP 7.1 Alb 4.0, Ca 9.1 Iron 86 TIBC 322 Iron  sat 27% Ferritin 35 Folic acid 17.37, B12 1204, WBC 4.08, Hgb 13.1, , ANC  2.51, retic 2.43%  12/9/20 , CR 1.18, TP 6.5, ALB 3.7, calcium 8.6, iron 100, TIBC 295, iron sat 34%, ferritin 31, folic acid 15.54, B12 1187, WBC 3.64, Hg 12.3, , ANC 2.16, retic 1K  8/18/20 Cr 1.3 Alb 4 TP 6.9 AST 12 ALT 10 Iron 83 TIBC 295 Ferritin 50 Folate 14.91 B12 1671 WBC 4.46 Hg 12.6 RBC 4.13 MCV 95.6 RDW 14.1  Retic % 2.53 Abs retic 100k  3/25/20 Cr 1.13 iron 86 TIBC 278 Ferritin 119 Folate 16 B12 >2000 WBC 2.80 RBC 3.91 Hgb 12.2 MCV 97  retic 2.45% ANC 1.70  1/31/20 Cr 1.23 Alb 4.2 Iron 57 TIBC 285 Ferritin 158 WBC 3.31 Hg 13 RBC 4.16 MCV 97.1  Retic ~85k ANC 1.75  12/6/19 Iron 89, TIBC 288, iron sat 31% Ferritin 378, Folic acid 18.04, b12 1535, Hgb 12.4, , WBC 3.54, ANC 1.96  10/22/19 WBC 3.5 Hg 11.1 MCV 92 RDW 17.6  Ferritin 12 Iron 61 TIBC 378  9/25/19 Iron 55 TIBC 393 Ferritin 32 Folate 18.8 B12 1453  8/13/19 Hg 9.5 MCV 80.4 WBC 4.75 PLT 52k RBC 3.78  8/9/19 Hg 3.2 MCV 65 RBC 1.89 RDW 22.4 PLT 119k    Lab History:  Iron 114, ferritin 10-- Hgb 10.5-- 1/21/2014  Iron 48, ferritin 9, iron sat 12%, Hgb 9.6-- 3/25/2014  Iron 31, ferritin 6, iron sat 8%, Hgb 8.7--5/22/2014  Iron 60, ferritin 49, iron sat 20%, Hgb 12.7- TIBC  305- 7/23/2014  Iron 12, ferritin 5, iron sat 3%, Hgb 6.4 Hct 22.5 TIBC 388--12/22/2014.  Iron 11, TIBC 446, iron sat 2.5%, ferritin 4.8-- 5/28/2015  Iron 75, TIBC 317, iron sat 24%, ferritin 538-- 6/23/2015  Iron 71, TIBC 355, iron sat 20%, ferritin 17--9/03/2015  Iron 107, TIBC 285, iron sat 38%, ferritin 255-- 10/15/2015  Iron 11, TIBC 305, iron sat 4%, ferritin 6 -- 02/12/2016  Iron 126, TIBC 335, iron sat 38%, ferritin 18- 3/14/16  Retic 4%, Ferritin 18- 4/19/16  Ferritin 14, retic 3%- 5/10/16  Iron  141, TIBC 333 , Iron sat  42%, Ferritin 18,  Retic 2.66  -6/6/16  Iron 209, TIBC 360, iron sat 58%, ferritin 21 - 8/30/16  10/2016 Ferritin 14, iron 24,  iron sat 7% -->Injectafer ordered 12/7/16 11/29/16 HGB 7.0--:> transfused  1/9/17  HGB 11.8, MCV 96, Ferritin 257  2/20/17 Ferritin 18, HGB 11.8  4/12/17 WBC 3.1, HGB 7.1, MCV 80, PLT 282K, Ferritin 6--> Injectafer 4/12/17 and 4/16/17 4/29/17 Ferritin 266  6/5/17 WBC 2.4, Hgb 7.4, MCV 87, , ferritin 10.--> Injectafer for 6/5/17 and 2 units packed red blood cells  7/5/17 WBC 8.1, Hgb 10.9, PLT 2:30 1K, MCV 96, ferritin 247  9/11/2017 WBC 2.8, Hgb 11.5, , ferritin 421  1/24/17 WBC 3.71, HGB 11.3, PLT 319K Ferritin 319  3/28/18 WBC 2.5, HGB 11.3, MCV 93, PLT 151K Ferritin 294  10/18/2023:  WBC 4.00, Hgb 13.2, Hct 41.0, Plt 170,000, Na+ 135, K+ 5.1, glucose 224 (non-fasting), creatinine 1.13, TIBC 293, Ferritin 168.     Procedures:  Capsule endoscopy report dated 7/12/2023:  Multiple small nonbleeding AVMs in the distal duodenum and proximal jejunum and 1 actively oozing AVM in the proximal.   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 Normal duodenum. Repeat EGD 8/10/19 normal stomach and esophagus with few benign gastric polyps  Colonoscopy 8/13/19 with polyps, internal and external hemorrhoids       Imaging: None    Path:  8/9/19 sigmoid colon biopsy: polypoid colonic mucosa with benign lymphoid aggregate and reactive mucosal changes. no hyperplastic polyp, adenomatous change or malignancy    Review of Systems  CONSTITUTIONAL: no fevers, no chills, no weight loss, +mild chronic fatigue, no weakness  HEMATOLOGIC: no abnormal bleeding, no abnormal bruising, no drenching  night sweats  ONCOLOGIC: no new masses or lumps  HEENT: no vision loss, no tinnitus or hearing loss, no nose bleeding, no dysphagia, no  odynophagia  CVS: no chest pain, no palpitations, +mild chronic dyspnea on exertion  RESP: + shortness of breath, no hemoptysis, no cough  GI: no nausea, no vomiting, no diarrhea, no constipation, no melena, no hematochezia, no hematemesis, no abdominal pain, no increase in abdominal girth  : no dysuria, no hematuria, no hesitancy, no scrotal swelling, no discharge  INTEGUMENT: no rashes, no abnormal bruising, no nail pitting, no hyperpigmentation  NEURO: no falls, no memory loss, no paresthesias or dysesthesias, no urofecal incontinence or retention, no loss of strength on any extremity  MSK: no back pain, no new joint pain, no joint swelling  PSYCH: no suicidal or homicidal ideation, no depression, no insomnia, no anhedonia  ENDOCRINE: no heat or cold intolerance, no polyuria, no polydipsia    Physical Exam  Vitals:    04/03/24 1102   BP: 128/67   Pulse: 62   Resp: 20   Temp: 98.3 °F (36.8 °C)              ECOG PS 1  GA: AAOx3, NAD  HEENT: NCAT, PERRLA, EOMI, no oral ulcers  LYMPH: no cervical, axillary or supraclavicular adenopathy  CVS: s1s2 RRR, no M/R/G  RESP: CTA b/l, expiratory wheeze (noted prior), no crackles or rhonchi  ABD: soft, NT, ND, BS+, no hepatosplenomegaly  EXT: no deformities, mild b/l pitting pedal edema  SKIN: no rashes, no bruises or purpura, warm and dry  NEURO: normal mentation, strength 5/5 on all 4 extremities, no sensory deficits     Assessment/Plan  1. Anemia due to iron deficiency D50.0  Given chronic CAD Hg should be >8/dl  MJ is due to recurrent GI blood loss.  Received IV Injectafer x 2 on 11/13/19 and 11/20/19 with appropriate response. IV iron also received 1/2023  Continue to f/u with cardiology as recommended, C/w inhaler PRN SOB  Currently taking oral iron BID. Recommend take 2 hours after eating with vit C.       2. Thrombocytopenia D69.6  Multifactorial. During hospitalization at least partially dilutional due to transfusion of 6 u of PRBC as well as consumptive due to  ongoing bleeding. Also likely due to medications including lasix, PPI and h2 blockers  PLT count has now normalized      Plan   Continue Ferrous Gluconate 324 mg PO BID.  Continue to follow with Dr. Fajardo, GI.   Follow-up in clinic in 3 months for office visit and repeat CBC, CMP, iron panel, ferritin, will repeat B12 and folic acid      The patient is in agreement with today's plan of care.  All questions answered.  Patient is aware to contact our office for any problems or concerns prior to next visit.

## 2024-04-03 NOTE — TELEPHONE ENCOUNTER
I spoke to Jayda at Dr. Fajardo office she states patient had EGD/Colonoscopy on 5/15/23 and follow up was on 5/29/24 has not been seen since. She is sending note from 5/29/24  ===View-only below this line===  ----- Message -----  From: Bekah Waite NP  Sent: 4/3/2024  11:27 AM CDT  To: Suzanne Connolly MA    Can we please call Dr. Fajardo office and find out if patient had any follow-up last August or september following capsule endoscopy exam. I see we sent a referral around 8/2023. Im asking because patient has AVM's within the small intestine and 1 of them is oozing. Based on capsule endoscopy report. Let me know thanks

## 2024-07-02 ENCOUNTER — OFFICE VISIT (OUTPATIENT)
Dept: HEMATOLOGY/ONCOLOGY | Facility: CLINIC | Age: 84
End: 2024-07-02
Payer: MEDICARE

## 2024-07-02 VITALS
HEART RATE: 58 BPM | DIASTOLIC BLOOD PRESSURE: 63 MMHG | BODY MASS INDEX: 28.14 KG/M2 | HEIGHT: 68 IN | RESPIRATION RATE: 18 BRPM | SYSTOLIC BLOOD PRESSURE: 107 MMHG | TEMPERATURE: 98 F | OXYGEN SATURATION: 99 % | WEIGHT: 185.69 LBS

## 2024-07-02 DIAGNOSIS — D50.0 IRON DEFICIENCY ANEMIA DUE TO CHRONIC BLOOD LOSS: Primary | ICD-10-CM

## 2024-07-02 PROCEDURE — 99214 OFFICE O/P EST MOD 30 MIN: CPT | Mod: ,,,

## 2024-07-02 RX ORDER — HEPARIN 100 UNIT/ML
500 SYRINGE INTRAVENOUS
OUTPATIENT
Start: 2024-07-03

## 2024-07-02 RX ORDER — DIPHENHYDRAMINE HYDROCHLORIDE 50 MG/ML
50 INJECTION INTRAMUSCULAR; INTRAVENOUS ONCE AS NEEDED
OUTPATIENT
Start: 2024-07-10

## 2024-07-02 RX ORDER — SODIUM CHLORIDE 0.9 % (FLUSH) 0.9 %
10 SYRINGE (ML) INJECTION
OUTPATIENT
Start: 2024-07-10

## 2024-07-02 RX ORDER — HEPARIN 100 UNIT/ML
500 SYRINGE INTRAVENOUS
OUTPATIENT
Start: 2024-07-10

## 2024-07-02 RX ORDER — DIPHENHYDRAMINE HYDROCHLORIDE 50 MG/ML
50 INJECTION INTRAMUSCULAR; INTRAVENOUS ONCE AS NEEDED
OUTPATIENT
Start: 2024-07-03

## 2024-07-02 RX ORDER — SODIUM CHLORIDE 0.9 % (FLUSH) 0.9 %
10 SYRINGE (ML) INJECTION
OUTPATIENT
Start: 2024-07-03

## 2024-07-02 RX ORDER — EPINEPHRINE 0.3 MG/.3ML
0.3 INJECTION SUBCUTANEOUS ONCE AS NEEDED
OUTPATIENT
Start: 2024-07-03

## 2024-07-02 RX ORDER — EPINEPHRINE 0.3 MG/.3ML
0.3 INJECTION SUBCUTANEOUS ONCE AS NEEDED
OUTPATIENT
Start: 2024-07-10

## 2024-07-02 NOTE — PROGRESS NOTES
PCP: Dr. 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 Lost to follow up    Has previously received multiple courses of IV iron. Multiple PRBC transfusions.    In 6/2019 he had EGD with Dr Fajardo that showed hiatal hernia, schatzki's ring, esophagitis, gastric ulcer and gastritis.    Most recently presented to [facility] 8/2019 with CP and found to have severe anemia with Hg of 3.2, FOBT+. He was admitted and received a total of 6u of PRBC. He had EGD with Dr Hilario and found to have a few benign sessile polyps in antrum and fundus. He also had colonoscopy with Dr Fajardo that showed colon polyp and internal and external hemorrhoids. 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 During admission he was noted to have thrombocytopenia as well w/ PLT count dropping to ~50k prior to discharge.    He was referred for outpatient capsule endoscopy which as per pt he had 9/2019, results not available.    Interval History:    07/02/2024:  Mr. Morgan is here today for his follow-up and labs regarding anemia with chronic blood loss. He does report increased fatigue lately.  He denies any headaches, dizziness, lightheadedness, SOB, chest palpitations, chest pain, abnormal bleeding, no melena, PICA, leg cramps, numbness and tingling of the extremities.  He is compliant with ferrous gluconate BID daily. No recent hospitalizations, infections, or illnesses.    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, hemorrhagic gastritis  PSHx: PCA, cataract, appendectomy  Social Hx: no ETOH, drugs, or tobacco  Allergies: NKDA  Meds: reviewed    Family Hx: none  pertinent    Labs:    06/25/24 cr 1.33, iron 138, ironsat 37% ferritin 12, folic acid 12.25, B12 647, wbc 3.88, hgb 11.1, plt 206, ANC 2.19  03/26/24: CMP unremarkable, iron 71, eitggyp22%, ferritin 30, wbc 3.79, hgb 11.3, plt 196,   12/26/23: iron 52, iron sat 16, ferritin 61, wbc 3.50, hgb 12.4, plt 190, ANC 1.88  10/18/23: iron 74, ferritin 168, wbc 4.00, hgb 13.2, plt 170  08/09/2023: CMP unremarkable, iron59, iron sat 16%, ferritin 14, wbc 3.21, hgb 9.2, plt 233, ANC 1.84  06/2023: CMP unremarkable, iron 115, iron sat 31%, ferritin 17, wbc 3.59, hgb 10.1, plt 187, ANC 2.07   05/09/23: CMP unremarkable, iron 67, iron sat 21%, ferritin 21, wbc 4.82, hgb 11.1, plt 203, ANC 3.01  03/07/23: cr 1.16, ca 8.7, iron 56, ironsat 20%, ferritin 97, wbc 3.94, hgb 10.0, plt 228, ANC 2.59  01/05/2023:  Creatinine 1.26, albumin 4.1, calcium 9.2, alkaline phosphatase 56, total bilirubin 0.2, AST 12, ALT 13, , iron 89, TIBC 418, % saturation 21, ferritin 14, folic acid 10.7, vitamin B12 983, WBC count 3.47, hemoglobin 9.3, MCV 90.5, platelet count 237.  9/8/22 Cr 1.18, Alb 3.9, , iron 56, TIBC 333, Ferritin 34, folic acid 10.87, B12 1872, WBC 3.46, Hgb 11.7,  MCV 93, , retic 2.13  5/6/22 Cr 1.33 Alb 3.9  Iron 77 TIBC 345 Ferritin 42 WBC 3.41 RBC 3.91 Hg 11.9 MCV 94.1 RDW 13.5  Retic 2.65%  1/17/22 Cr 1.24, Alb 4.1, Iron 57, TIBC 324, iron sat 18%, Ferritin 56, folic acid 9.29, B12 1799, WBC 3.60, Hgb 13.0, MCV  93.8,   9/20/21 Cr 1.25 Alb 4.3  Iron 75 TIBC 345 Ferritin 48 WBC 4.09 Hg 13.3   7/20/21 Cr 1.55, Alb 3.8, TP 6.8, iron 47, TIBC 334, Ferritin 30, WBC 3.71, Hgb 12.6, MCV 92.3, RDW 13.0, , retic 1.93%  6/7/21 Cr 1.16, Alb 4.1, TP 7.3, iron 52, TIBC 371, Ferritin 21, Folic acid 13.76, B12 >2000, WBC 3.74, Hgb 11.9, MCV 93.2, RDW 13.7, , retic 3.34  4/12/21 Cr 1.17 Alb 4.2 TP 7.6 Iron 55 TIBC 342. Ferritin 40 Folate 12.68 B12>2000 WBC 3.72 RBC 4.56 Hg 14  MCV 92.1 RDW 13.6  Retic 120k ANC 2.06  2/18/21 Glu 239, Cr 1.24, TP 7.1 Alb 4.0, Ca 9.1 Iron 86 TIBC 322 Iron sat 27% Ferritin 35 Folic acid 17.37, B12 1204, WBC 4.08, Hgb 13.1, , ANC  2.51, retic 2.43%  12/9/20 , CR 1.18, TP 6.5, ALB 3.7, calcium 8.6, iron 100, TIBC 295, iron sat 34%, ferritin 31, folic acid 15.54, B12 1187, WBC 3.64, Hg 12.3, , ANC 2.16, retic 1K  8/18/20 Cr 1.3 Alb 4 TP 6.9 AST 12 ALT 10 Iron 83 TIBC 295 Ferritin 50 Folate 14.91 B12 1671 WBC 4.46 Hg 12.6 RBC 4.13 MCV 95.6 RDW 14.1  Retic % 2.53 Abs retic 100k  3/25/20 Cr 1.13 iron 86 TIBC 278 Ferritin 119 Folate 16 B12 >2000 WBC 2.80 RBC 3.91 Hgb 12.2 MCV 97  retic 2.45% ANC 1.70  1/31/20 Cr 1.23 Alb 4.2 Iron 57 TIBC 285 Ferritin 158 WBC 3.31 Hg 13 RBC 4.16 MCV 97.1  Retic ~85k ANC 1.75  12/6/19 Iron 89, TIBC 288, iron sat 31% Ferritin 378, Folic acid 18.04, b12 1535, Hgb 12.4, , WBC 3.54, ANC 1.96  10/22/19 WBC 3.5 Hg 11.1 MCV 92 RDW 17.6  Ferritin 12 Iron 61 TIBC 378  9/25/19 Iron 55 TIBC 393 Ferritin 32 Folate 18.8 B12 1453  8/13/19 Hg 9.5 MCV 80.4 WBC 4.75 PLT 52k RBC 3.78  8/9/19 Hg 3.2 MCV 65 RBC 1.89 RDW 22.4 PLT 119k    Lab History:  Iron 114, ferritin 10-- Hgb 10.5-- 1/21/2014  Iron 48, ferritin 9, iron sat 12%, Hgb 9.6-- 3/25/2014  Iron 31, ferritin 6, iron sat 8%, Hgb 8.7--5/22/2014  Iron 60, ferritin 49, iron sat 20%, Hgb 12.7- TIBC  305- 7/23/2014  Iron 12, ferritin 5, iron sat 3%, Hgb 6.4 Hct 22.5 TIBC 388--12/22/2014.  Iron 11, TIBC 446, iron sat 2.5%, ferritin 4.8-- 5/28/2015  Iron 75, TIBC 317, iron sat 24%, ferritin 538-- 6/23/2015  Iron 71, TIBC 355, iron sat 20%, ferritin 17--9/03/2015  Iron 107, TIBC 285, iron sat 38%, ferritin 255-- 10/15/2015  Iron 11, TIBC 305, iron sat 4%, ferritin 6 -- 02/12/2016  Iron 126, TIBC 335, iron sat 38%, ferritin 18- 3/14/16  Retic 4%, Ferritin 18- 4/19/16  Ferritin 14, retic 3%- 5/10/16  Iron  141, TIBC 333 , Iron sat  42%,  Ferritin 18,  Retic 2.66  -6/6/16  Iron 209, TIBC 360, iron sat 58%, ferritin 21 - 8/30/16  10/2016 Ferritin 14, iron 24, iron sat 7% -->Injectafer ordered 12/7/16 11/29/16 HGB 7.0--:> transfused  1/9/17  HGB 11.8, MCV 96, Ferritin 257  2/20/17 Ferritin 18, HGB 11.8  4/12/17 WBC 3.1, HGB 7.1, MCV 80, PLT 282K, Ferritin 6--> Injectafer 4/12/17 and 4/16/17 4/29/17 Ferritin 266  6/5/17 WBC 2.4, Hgb 7.4, MCV 87, , ferritin 10.--> Injectafer for 6/5/17 and 2 units packed red blood cells  7/5/17 WBC 8.1, Hgb 10.9, PLT 2:30 1K, MCV 96, ferritin 247  9/11/2017 WBC 2.8, Hgb 11.5, , ferritin 421  1/24/17 WBC 3.71, HGB 11.3, PLT 319K Ferritin 319  3/28/18 WBC 2.5, HGB 11.3, MCV 93, PLT 151K Ferritin 294  10/18/2023:  WBC 4.00, Hgb 13.2, Hct 41.0, Plt 170,000, Na+ 135, K+ 5.1, glucose 224 (non-fasting), creatinine 1.13, TIBC 293, Ferritin 168.     Procedures:  Capsule endoscopy report dated 7/12/2023:  Multiple small nonbleeding AVMs in the distal duodenum and proximal jejunum and 1 actively oozing AVM in the proximal.   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 Normal duodenum. Repeat EGD 8/10/19 normal stomach and esophagus with few benign gastric polyps  Colonoscopy 8/13/19 with polyps, internal and external hemorrhoids       Imaging: None    Path:  8/9/19 sigmoid colon biopsy: polypoid colonic mucosa with benign lymphoid aggregate and reactive mucosal changes. no hyperplastic polyp, adenomatous change or malignancy    Review of Systems  CONSTITUTIONAL: no fevers, no chills, no weight loss, +mild chronic fatigue, no weakness  HEMATOLOGIC: no abnormal bleeding, no abnormal bruising, no drenching  night sweats  ONCOLOGIC:  no new masses or lumps  HEENT: no vision loss, no tinnitus or hearing loss, no nose bleeding, no dysphagia, no odynophagia  CVS: no chest pain, no palpitations, +mild chronic dyspnea on exertion  RESP: + shortness of breath, no hemoptysis, no cough  GI: no nausea, no vomiting, no diarrhea, no constipation, no melena, no hematochezia, no hematemesis, no abdominal pain, no increase in abdominal girth  : no dysuria, no hematuria, no hesitancy, no scrotal swelling, no discharge  INTEGUMENT: no rashes, no abnormal bruising, no nail pitting, no hyperpigmentation  NEURO: no falls, no memory loss, no paresthesias or dysesthesias, no urofecal incontinence or retention, no loss of strength on any extremity  MSK: no back pain, no new joint pain, no joint swelling  PSYCH: no suicidal or homicidal ideation, no depression, no insomnia, no anhedonia  ENDOCRINE: no heat or cold intolerance, no polyuria, no polydipsia    Physical Exam  Vitals:    07/02/24 1357   BP: 107/63   Pulse: (!) 58   Resp: 18   Temp: 98 °F (36.7 °C)              ECOG PS 1  GA: AAOx3, NAD  HEENT: NCAT, PERRLA, EOMI, no oral ulcers  LYMPH: no cervical, axillary or supraclavicular adenopathy  CVS: s1s2 RRR, no M/R/G  RESP: CTA b/l, expiratory wheeze (noted prior), no crackles or rhonchi  ABD: soft, NT, ND, BS+, no hepatosplenomegaly  EXT: no deformities, mild b/l pitting pedal edema  SKIN: no rashes, no bruises or purpura, warm and dry  NEURO: normal mentation, strength 5/5 on all 4 extremities, no sensory deficits     Assessment/Plan  1. Anemia due to iron deficiency D50.0  Given chronic CAD Hg should be >8/dl  MJ is due to recurrent GI blood loss.  Received IV Injectafer x 2 on 11/13/19 and 11/20/19 with appropriate response. IV iron also received 1/2023  Continue to f/u with cardiology as recommended, C/w inhaler PRN SOB  Currently taking oral iron BID. Recommend take 2 hours after eating with vit C.       2. Thrombocytopenia D69.6  Multifactorial. During  hospitalization at least partially dilutional due to transfusion of 6 u of PRBC as well as consumptive due to ongoing bleeding. Also likely due to medications including lasix, PPI and h2 blockers  PLT count has now normalized    Plan   Labs reviewed and noted abnormal ferritin of 12  Will plan for Injectafer X 2 doses pending insurance approval.   Continue Ferrous Gluconate 324 mg PO BID after IV infusion  Recommend patient follow-up with Dr. Fajardo, GI.   Follow-up in clinic in 3 months for office visit and repeat CBC, CMP, iron panel, ferritin, will repeat B12 and folic acid      The patient is in agreement with today's plan of care.  All questions answered.  Patient is aware to contact our office for any problems or concerns prior to next visit.

## 2024-10-03 ENCOUNTER — OFFICE VISIT (OUTPATIENT)
Dept: HEMATOLOGY/ONCOLOGY | Facility: CLINIC | Age: 84
End: 2024-10-03
Payer: MEDICARE

## 2024-10-03 VITALS
BODY MASS INDEX: 27.94 KG/M2 | OXYGEN SATURATION: 100 % | DIASTOLIC BLOOD PRESSURE: 72 MMHG | RESPIRATION RATE: 14 BRPM | TEMPERATURE: 98 F | HEART RATE: 64 BPM | SYSTOLIC BLOOD PRESSURE: 130 MMHG | HEIGHT: 68 IN | WEIGHT: 184.38 LBS

## 2024-10-03 DIAGNOSIS — D50.0 IRON DEFICIENCY ANEMIA DUE TO CHRONIC BLOOD LOSS: Primary | ICD-10-CM

## 2024-10-03 RX ORDER — FERROUS GLUCONATE 324(38)MG
324 TABLET ORAL 2 TIMES DAILY
Qty: 60 TABLET | Refills: 6 | Status: SHIPPED | OUTPATIENT
Start: 2024-10-03

## 2024-10-03 RX ORDER — ASCORBIC ACID 500 MG
TABLET ORAL
Qty: 60 TABLET | Refills: 6 | Status: SHIPPED | OUTPATIENT
Start: 2024-10-03

## 2024-10-03 NOTE — PROGRESS NOTES
PCP: Dr. 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 Lost to follow up    Has previously received multiple courses of IV iron. Multiple PRBC transfusions.    In 6/2019 he had EGD with Dr Fajardo that showed hiatal hernia, schatzki's ring, esophagitis, gastric ulcer and gastritis.    Most recently [place] [facility] [place] with CP and found to have severe anemia with Hg of 3.2, FOBT+. He was admitted and received a total of 6u of PRBC. He had EGD with Dr Hilario and found to have a few benign sessile polyps in antrum and fundus. He also had colonoscopy with Dr Fajardo that showed colon polyp and internal and external hemorrhoids. 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 During admission he was noted to have thrombocytopenia as well w/ PLT count dropping to ~50k prior to discharge.    He was referred for outpatient capsule endoscopy which as per pt he had 9/2019, results not available.    Interval History:    10/03/2024:  Mr. Morgan returns for his follow-up and labs regarding iron deficiency anemia. He continues with fatigue despite receiving 2 doses of Injectafer 7/2024.   He denies any headaches, dizziness, lightheadedness, SOB, chest palpitations, chest pain, abnormal bleeding, no melena, PICA, leg cramps, numbness and tingling of the extremities.  He is only taking ferrous gluconate once daily. No recent hospitalizations, infections, or illnesses.    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, hemorrhagic gastritis  PSHx: PCA, cataract, appendectomy  Social Hx: no ETOH, drugs, or tobacco  Allergies: NKDA  Meds: reviewed    Family Hx: none  pertinent    Labs:    09/23/24: CMP WNL, iron 40, iron sat 13%, ferritin 58, folic acid 11.02, B12 540, wbc 4.04, hgb 11.5, plt 215  06/25/24 cr 1.33, iron 138, ironsat 37% ferritin 12, folic acid 12.25, B12 647, wbc 3.88, hgb 11.1, plt 206, ANC 2.19  03/26/24: CMP unremarkable, iron 71, heajofg58%, ferritin 30, wbc 3.79, hgb 11.3, plt 196,   12/26/23: iron 52, iron sat 16, ferritin 61, wbc 3.50, hgb 12.4, plt 190, ANC 1.88  10/18/23: iron 74, ferritin 168, wbc 4.00, hgb 13.2, plt 170  08/09/2023: CMP unremarkable, iron59, iron sat 16%, ferritin 14, wbc 3.21, hgb 9.2, plt 233, ANC 1.84  06/2023: CMP unremarkable, iron 115, iron sat 31%, ferritin 17, wbc 3.59, hgb 10.1, plt 187, ANC 2.07   05/09/23: CMP unremarkable, iron 67, iron sat 21%, ferritin 21, wbc 4.82, hgb 11.1, plt 203, ANC 3.01  03/07/23: cr 1.16, ca 8.7, iron 56, ironsat 20%, ferritin 97, wbc 3.94, hgb 10.0, plt 228, ANC 2.59  01/05/2023:  Creatinine 1.26, albumin 4.1, calcium 9.2, alkaline phosphatase 56, total bilirubin 0.2, AST 12, ALT 13, , iron 89, TIBC 418, % saturation 21, ferritin 14, folic acid 10.7, vitamin B12 983, WBC count 3.47, hemoglobin 9.3, MCV 90.5, platelet count 237.  9/8/22 Cr 1.18, Alb 3.9, , iron 56, TIBC 333, Ferritin 34, folic acid 10.87, B12 1872, WBC 3.46, Hgb 11.7,  MCV 93, , retic 2.13  5/6/22 Cr 1.33 Alb 3.9  Iron 77 TIBC 345 Ferritin 42 WBC 3.41 RBC 3.91 Hg 11.9 MCV 94.1 RDW 13.5  Retic 2.65%  1/17/22 Cr 1.24, Alb 4.1, Iron 57, TIBC 324, iron sat 18%, Ferritin 56, folic acid 9.29, B12 1799, WBC 3.60, Hgb 13.0, MCV  93.8,   9/20/21 Cr 1.25 Alb 4.3  Iron 75 TIBC 345 Ferritin 48 WBC 4.09 Hg 13.3   7/20/21 Cr 1.55, Alb 3.8, TP 6.8, iron 47, TIBC 334, Ferritin 30, WBC 3.71, Hgb 12.6, MCV 92.3, RDW 13.0, , retic 1.93%  6/7/21 Cr 1.16, Alb 4.1, TP 7.3, iron 52, TIBC 371, Ferritin 21, Folic acid 13.76, B12 >2000, WBC 3.74, Hgb 11.9, MCV 93.2, RDW 13.7, , retic  3.34  4/12/21 Cr 1.17 Alb 4.2 TP 7.6 Iron 55 TIBC 342. Ferritin 40 Folate 12.68 B12>2000 WBC 3.72 RBC 4.56 Hg 14 MCV 92.1 RDW 13.6  Retic 120k ANC 2.06  2/18/21 Glu 239, Cr 1.24, TP 7.1 Alb 4.0, Ca 9.1 Iron 86 TIBC 322 Iron sat 27% Ferritin 35 Folic acid 17.37, B12 1204, WBC 4.08, Hgb 13.1, , ANC  2.51, retic 2.43%  12/9/20 , CR 1.18, TP 6.5, ALB 3.7, calcium 8.6, iron 100, TIBC 295, iron sat 34%, ferritin 31, folic acid 15.54, B12 1187, WBC 3.64, Hg 12.3, , ANC 2.16, retic 1K  8/18/20 Cr 1.3 Alb 4 TP 6.9 AST 12 ALT 10 Iron 83 TIBC 295 Ferritin 50 Folate 14.91 B12 1671 WBC 4.46 Hg 12.6 RBC 4.13 MCV 95.6 RDW 14.1  Retic % 2.53 Abs retic 100k  3/25/20 Cr 1.13 iron 86 TIBC 278 Ferritin 119 Folate 16 B12 >2000 WBC 2.80 RBC 3.91 Hgb 12.2 MCV 97  retic 2.45% ANC 1.70  1/31/20 Cr 1.23 Alb 4.2 Iron 57 TIBC 285 Ferritin 158 WBC 3.31 Hg 13 RBC 4.16 MCV 97.1  Retic ~85k ANC 1.75  12/6/19 Iron 89, TIBC 288, iron sat 31% Ferritin 378, Folic acid 18.04, b12 1535, Hgb 12.4, , WBC 3.54, ANC 1.96  10/22/19 WBC 3.5 Hg 11.1 MCV 92 RDW 17.6  Ferritin 12 Iron 61 TIBC 378  9/25/19 Iron 55 TIBC 393 Ferritin 32 Folate 18.8 B12 1453  8/13/19 Hg 9.5 MCV 80.4 WBC 4.75 PLT 52k RBC 3.78  8/9/19 Hg 3.2 MCV 65 RBC 1.89 RDW 22.4 PLT 119k    Lab History:  Iron 114, ferritin 10-- Hgb 10.5-- 1/21/2014  Iron 48, ferritin 9, iron sat 12%, Hgb 9.6-- 3/25/2014  Iron 31, ferritin 6, iron sat 8%, Hgb 8.7--5/22/2014  Iron 60, ferritin 49, iron sat 20%, Hgb 12.7- TIBC  305- 7/23/2014  Iron 12, ferritin 5, iron sat 3%, Hgb 6.4 Hct 22.5 TIBC 388--12/22/2014.  Iron 11, TIBC 446, iron sat 2.5%, ferritin 4.8-- 5/28/2015  Iron 75, TIBC 317, iron sat 24%, ferritin 538-- 6/23/2015  Iron 71, TIBC 355, iron sat 20%, ferritin 17--9/03/2015  Iron 107, TIBC 285, iron sat 38%, ferritin 255-- 10/15/2015  Iron 11, TIBC 305, iron sat 4%, ferritin 6 -- 02/12/2016  Iron 126, TIBC 335, iron sat 38%, ferritin 18-  3/14/16  Retic 4%, Ferritin 18- 4/19/16  Ferritin 14, retic 3%- 5/10/16  Iron  141, TIBC 333 , Iron sat  42%, Ferritin 18,  Retic 2.66  -6/6/16  Iron 209, TIBC 360, iron sat 58%, ferritin 21 - 8/30/16  10/2016 Ferritin 14, iron 24, iron sat 7% -->Injectafer ordered 12/7/16 11/29/16 HGB 7.0--:> transfused  1/9/17  HGB 11.8, MCV 96, Ferritin 257  2/20/17 Ferritin 18, HGB 11.8  4/12/17 WBC 3.1, HGB 7.1, MCV 80, PLT 282K, Ferritin 6--> Injectafer 4/12/17 and 4/16/17 4/29/17 Ferritin 266  6/5/17 WBC 2.4, Hgb 7.4, MCV 87, , ferritin 10.--> Injectafer for 6/5/17 and 2 units packed red blood cells  7/5/17 WBC 8.1, Hgb 10.9, PLT 2:30 1K, MCV 96, ferritin 247  9/11/2017 WBC 2.8, Hgb 11.5, , ferritin 421  1/24/17 WBC 3.71, HGB 11.3, PLT 319K Ferritin 319  3/28/18 WBC 2.5, HGB 11.3, MCV 93, PLT 151K Ferritin 294  10/18/2023:  WBC 4.00, Hgb 13.2, Hct 41.0, Plt 170,000, Na+ 135, K+ 5.1, glucose 224 (non-fasting), creatinine 1.13, TIBC 293, Ferritin 168.     Procedures:  Capsule endoscopy report dated 7/12/2023:  Multiple small nonbleeding AVMs in the distal duodenum and proximal jejunum and 1 actively oozing AVM in the proximal.   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 Normal duodenum. Repeat EGD 8/10/19 normal stomach and esophagus with few benign gastric polyps  Colonoscopy 8/13/19 with polyps, internal and external hemorrhoids       Imaging: None    Path:  8/9/19 sigmoid colon biopsy: polypoid colonic mucosa with benign lymphoid aggregate and reactive mucosal changes. no hyperplastic polyp, adenomatous change or malignancy    Review of Systems  CONSTITUTIONAL: no fevers, no chills, no weight loss, +mild chronic fatigue,  no weakness  HEMATOLOGIC: no abnormal bleeding, no abnormal bruising, no drenching  night sweats  ONCOLOGIC: no new masses or lumps  HEENT: no vision loss, no tinnitus or hearing loss, no nose bleeding, no dysphagia, no odynophagia  CVS: no chest pain, no palpitations, +mild chronic dyspnea on exertion  RESP: + shortness of breath, no hemoptysis, no cough  GI: no nausea, no vomiting, no diarrhea, no constipation, no melena, no hematochezia, no hematemesis, no abdominal pain, no increase in abdominal girth  : no dysuria, no hematuria, no hesitancy, no scrotal swelling, no discharge  INTEGUMENT: no rashes, no abnormal bruising, no nail pitting, no hyperpigmentation  NEURO: no falls, no memory loss, no paresthesias or dysesthesias, no urofecal incontinence or retention, no loss of strength on any extremity  MSK: no back pain, no new joint pain, no joint swelling  PSYCH: no suicidal or homicidal ideation, no depression, no insomnia, no anhedonia  ENDOCRINE: no heat or cold intolerance, no polyuria, no polydipsia    Physical Exam  There were no vitals filed for this visit.             ECOG PS 1  GA: AAOx3, NAD  HEENT: NCAT, PERRLA, EOMI, no oral ulcers  LYMPH: no cervical, axillary or supraclavicular adenopathy  CVS: s1s2 RRR, no M/R/G  RESP: CTA b/l, expiratory wheeze (noted prior), no crackles or rhonchi  ABD: soft, NT, ND, BS+, no hepatosplenomegaly  EXT: no deformities, mild b/l pitting pedal edema  SKIN: no rashes, no bruises or purpura, warm and dry  NEURO: normal mentation, strength 5/5 on all 4 extremities, no sensory deficits     Assessment/Plan  1. Anemia due to iron deficiency D50.0  Given chronic CAD Hg should be >8/dl  MJ is due to recurrent GI blood loss.  Received IV Injectafer x 2 on 11/13/19 and 11/20/19 with appropriate response. IV iron also received 1/2023  Continue to f/u with cardiology as recommended, C/w inhaler PRN SOB  Currently taking oral iron BID. Recommend take 2 hours after eating with  vit C.   Injectafer X 2 doses given 7/2024      2. Thrombocytopenia D69.6  Multifactorial. During hospitalization at least partially dilutional due to transfusion of 6 u of PRBC as well as consumptive due to ongoing bleeding. Also likely due to medications including lasix, PPI and h2 blockers  PLT count has now normalized    Plan   Labs reviewed and noted ferritin of 58   Reinforced take Ferrous Gluconate 324 mg PO BID with vit C 2 hours after meals  Recommend patient follow-up with Dr. Fajardo, GI.   Follow-up in clinic in 8 weeks for office visit and repeat CBC, CMP, iron panel, ferritin, will repeat B12 and folic acid      The patient is in agreement with today's plan of care.  All questions answered.  Patient is aware to contact our office for any problems or concerns prior to next visit.

## 2024-10-04 ENCOUNTER — HOSPITAL ENCOUNTER (OUTPATIENT)
Dept: TELEMEDICINE | Facility: HOSPITAL | Age: 84
Discharge: HOME | End: 2024-10-04
Payer: MEDICARE

## 2024-10-07 ENCOUNTER — TELEMEDICINE (OUTPATIENT)
Dept: NEUROLOGY | Facility: HOSPITAL | Age: 84
End: 2024-10-07
Payer: MEDICARE

## 2024-10-07 DIAGNOSIS — R55 CONVULSIVE SYNCOPE: Primary | ICD-10-CM

## 2024-10-07 PROCEDURE — G0426 INPT/ED TELECONSULT50: HCPCS | Mod: 95,,, | Performed by: PSYCHIATRY & NEUROLOGY

## 2024-10-07 NOTE — CONSULTS
"[facility] NEUROLOGY  Neurology  Consult Note    Patient Name: Alex Morgan  MRN: [id number]  Admission Date: (Not on file)  Hospital Length of Stay: 0 days  Code Status: [unfilled]   Attending Provider:  Tonio Agrawal MD  Consulting Provider: Wily Solis MD  Primary Care Physician: Emelia, Primary Doctor  Principal Problem:[unfilled]    Consults  Subjective:     Chief Complaint: "I passed out"    HPI: 82 y/o male with medical Hx of HTN, DM was brought to ED after a syncope at home. Pt was sitting on a commode when he lost consciousness and was seen having convulsive activity that reported lasted around 15 seconds. Pt is back to his baseline. No reported headaches, unilateral weakness or numbness. He does tell me that he was feeling weak that day.     Past Medical History:   Diagnosis Date    Anemia     Diabetes mellitus     Hyperlipidemia     Hypertension     Thrombocytopenia        Past Surgical History:   Procedure Laterality Date    ANGIOGRAM, CORONARY, WITH LEFT HEART CATHETERIZATION      APPENDECTOMY      Cataract Surgery      CORONARY ANGIOPLASTY WITH STENT PLACEMENT         Review of patient's allergies indicates:  No Known Allergies    Current Neurological Medications:     Current Outpatient Medications on File Prior to Visit   Medication Sig    albuterol (PROVENTIL/VENTOLIN HFA) 90 mcg/actuation inhaler Inhale 1 puff into the lungs 2 (two) times daily.    albuterol sulfate (PROAIR RESPICLICK) 90 mcg/actuation inhaler Inhale into the lungs every 4 (four) hours as needed.    amLODIPine (NORVASC) 5 MG tablet TAKE 1 TABLET BY MOUTH DAILY AT 11 AM    ascorbic acid, vitamin C, (VITAMIN C) 500 MG tablet Take 1 tablet (500 mg) by mouth twice daily with oral iron.    b complex vitamins tablet Take 1 tablet by mouth once daily.    brimonidine 0.2% (ALPHAGAN) 0.2 % Drop 1 drop every 8 (eight) hours.    carvediloL (COREG) 6.25 MG tablet TAKE 1 TABLET BY MOUTH TWICE DAILY FOR BLOOD PRESSURE AND HEART    " cloNIDine (CATAPRES) 0.1 MG tablet TAKE 1 TABLET BY MOUTH EVERY 6 HOURS AS NEEDED FOR SYSTOLIC BLOOD PRESSURE OVER 170    COMBIGAN 0.2-0.5 % Drop INSTILL 1 DROP BOTH EYES TWICE DAILY    ferrous gluconate (FERGON) 324 MG tablet Take 1 tablet (324 mg total) by mouth 2 (two) times a day. Take 2 hours after eating    fluticasone-umeclidin-vilanter (TRELEGY ELLIPTA) 200-62.5-25 mcg inhaler Inhale into the lungs.    furosemide (LASIX) 20 MG tablet TAKE 1/2 TABLET BY MOUTH IN THE MORNING    glipiZIDE (GLUCOTROL) 10 MG tablet Take 10 mg by mouth 2 (two) times daily.    glipiZIDE (GLUCOTROL) 10 MG tablet Take 10 mg by mouth.    isosorbide mononitrate (IMDUR) 60 MG 24 hr tablet Take 120 mg by mouth nightly.    isosorbide mononitrate (IMDUR) 60 MG 24 hr tablet Take 2 tablets by mouth every evening. (Patient not taking: Reported on 10/3/2024)    levocetirizine (XYZAL) 5 MG tablet Take 5 mg by mouth. (Patient not taking: Reported on 10/3/2024)    lisinopriL (PRINIVIL,ZESTRIL) 20 MG tablet Take 20 mg by mouth.    lisinopriL (PRINIVIL,ZESTRIL) 20 MG tablet Take 20 mg by mouth.    magnesium oxide (MAG-OX) 400 mg (241.3 mg magnesium) tablet Take 1 tablet by mouth 2 (two) times daily.    magnesium oxide (MAG-OX) 400 mg (241.3 mg magnesium) tablet Take 400 mg by mouth.    metFORMIN (GLUCOPHAGE) 500 MG tablet Take 500 mg by mouth. (Patient not taking: Reported on 10/3/2024)    metFORMIN (GLUCOPHAGE-XR) 500 MG ER 24hr tablet     pantoprazole (PROTONIX) 40 MG tablet Take 40 mg by mouth every morning. (Patient not taking: Reported on 10/3/2024)    polyethylene glycol (GLYCOLAX) 17 gram/dose powder Take 17 g by mouth as needed.    ranolazine (RANEXA) 1,000 mg Tb12 Take 500 mg by mouth 2 (two) times daily.    ranolazine (RANEXA) 1,000 mg Tb12 Take 1,000 mg by mouth.    ranolazine (RANEXA) 1,000 mg Tb12 Take 1,000 mg by mouth 2 (two) times daily.    rosuvastatin (CRESTOR) 20 MG tablet Take 20 mg by mouth every evening.    rosuvastatin  (CRESTOR) 20 MG tablet Take 1 tablet by mouth every evening.    spironolactone (ALDACTONE) 25 MG tablet TAKE 1/2 TABLET BY MOUTH MONDAY WEDNESDAY AND FRIDAY MORNING (Patient not taking: Reported on 10/3/2024)    triamcinolone acetonide 0.025 % Lotn PUT ONE DROP IN BOTH EARS EVERY NIGHT FOR SIX MONTHS (Patient not taking: Reported on 10/3/2024)     No current facility-administered medications on file prior to visit.      Family History       Problem Relation (Age of Onset)    Coronary artery disease Mother    Diabetes Mother    Hypertension Mother          Tobacco Use    Smoking status: Never    Smokeless tobacco: Never   Substance and Sexual Activity    Alcohol use: Not Currently    Drug use: Never    Sexual activity: Not Currently     Review of Systems   Constitutional:  Negative for chills.   HENT:  Negative for facial swelling.    Eyes:  Negative for pain.   Respiratory:  Negative for chest tightness.    Cardiovascular:  Negative for chest pain.   Gastrointestinal:  Negative for abdominal distention.   Genitourinary:  Negative for dysuria.   Musculoskeletal:  Negative for neck pain.   Neurological:  Negative for headaches.     Objective:     Vital Signs (Most Recent):    Vital Signs (24h Range):  [unfilled]        There is no height or weight on file to calculate BMI.    Physical Exam  Constitutional:       General: He is not in acute distress.  Pulmonary:      Effort: No respiratory distress.   Neurological:      Mental Status: He is oriented to person, place, and time.   Psychiatric:         Speech: Speech normal.         NEUROLOGICAL EXAMINATION:     MENTAL STATUS   Oriented to person, place, and time.   Speech: speech is normal   Level of consciousness: alert    CRANIAL NERVES     CN III, IV, VI   Conjugate gaze: present    CN VII   Right facial weakness: none  Left facial weakness: none    CN XII   Tongue deviation: none    MOTOR EXAM        AROM of UE's and LE's against gravity; no drift       Significant  Labs: CBC: [unfilled]  CMP: [unfilled]          Significant Imaging: I have reviewed all pertinent imaging results/findings within the past 24 hours.    Assessment and Plan:     82 y/o male consulted for possible seizure. Given circumstance surrounding his LOC this could had been a vasovagal response resulting in a convulsive syncope. No metabolic derangements. MRI no no apparent concerning findings regarding etiology of syncope. CTA does show bilateral stenoses at the siphon of up to 70%.   -Echo   -Orthostatic.   -ASA 81 mg daily.   -Atorvastatin 40 mg daily.    VTE Risk Mitigation (From admission, onward)      None            Thank you for your consult. I will follow-up with patient. Please contact us if you have any additional questions.    Wily Solis MD  Neurology  [facility] NEUROLOGY

## 2024-12-03 ENCOUNTER — OFFICE VISIT (OUTPATIENT)
Dept: HEMATOLOGY/ONCOLOGY | Facility: CLINIC | Age: 84
End: 2024-12-03
Payer: MEDICARE

## 2024-12-03 VITALS
WEIGHT: 185.5 LBS | BODY MASS INDEX: 28.11 KG/M2 | RESPIRATION RATE: 18 BRPM | SYSTOLIC BLOOD PRESSURE: 115 MMHG | TEMPERATURE: 99 F | HEIGHT: 68 IN | DIASTOLIC BLOOD PRESSURE: 63 MMHG | OXYGEN SATURATION: 99 % | HEART RATE: 64 BPM

## 2024-12-03 DIAGNOSIS — D50.0 IRON DEFICIENCY ANEMIA DUE TO CHRONIC BLOOD LOSS: ICD-10-CM

## 2024-12-03 PROCEDURE — 99214 OFFICE O/P EST MOD 30 MIN: CPT | Mod: ,,, | Performed by: STUDENT IN AN ORGANIZED HEALTH CARE EDUCATION/TRAINING PROGRAM

## 2024-12-03 NOTE — PROGRESS NOTES
PCP: Dr. 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 Lost to follow up    Has previously received multiple courses of IV iron. Multiple PRBC transfusions.    In 6/2019 he had EGD with Dr Fajardo that showed hiatal hernia, schatzki's ring, esophagitis, gastric ulcer and gastritis.    [place] [facility] [place] and found to have severe anemia with Hg of 3.2, FOBT+. He was admitted and received a total of 6u of PRBC. He had EGD with Dr Hilario and found to have a few benign sessile polyps in antrum and fundus. He also had colonoscopy with Dr Fajardo that showed colon polyp and internal and external hemorrhoids. 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 During admission he was noted to have thrombocytopenia as well w/ PLT count dropping to ~50k prior to discharge.    He was referred for outpatient capsule endoscopy which as per pt he had 9/2019, results not available.    Interval History:    Today, 12/03/2024, patient was any acute concerns.  He reports persistent fatigue which is unchanged from prior.  He denies any chest pain, shortness of breath, blood in his stools, dark tarry stools.  He reports compliance with oral iron.  He reports tolerating it well without any side effects of bloating, heartburn or constipation.  He denies any new medications, ER or hospital visits since last follow-up with us.      Labs:    11/25/2024 creatinine 1.3, LFTs unremarkable, Iron 78, TIBC 360, % saturation 22, ferritin 37, folic acid 12.7, WBC count 4.3, hemoglobin 12.0, MCV 92.8, platelet count 200, ANC 2.6.    09/23/24: CMP WNL, iron 40, iron sat 13%,  ferritin 58, folic acid 11.02, B12 540, wbc 4.04, hgb 11.5, plt 215  06/25/24 cr 1.33, iron 138, ironsat 37% ferritin 12, folic acid 12.25, B12 647, wbc 3.88, hgb 11.1, plt 206, ANC 2.19  03/26/24: CMP unremarkable, iron 71, [id number]%, ferritin 30, wbc 3.79, hgb 11.3, plt 196,   12/26/23: iron 52, iron sat 16, ferritin 61, wbc 3.50, hgb 12.4, plt 190, ANC 1.88  10/18/23: iron 74, ferritin 168, wbc 4.00, hgb 13.2, plt 170  08/09/2023: CMP unremarkable, iron59, iron sat 16%, ferritin 14, wbc 3.21, hgb 9.2, plt 233, ANC 1.84  06/2023: CMP unremarkable, iron 115, iron sat 31%, ferritin 17, wbc 3.59, hgb 10.1, plt 187, ANC 2.07   05/09/23: CMP unremarkable, iron 67, iron sat 21%, ferritin 21, wbc 4.82, hgb 11.1, plt 203, ANC 3.01  03/07/23: cr 1.16, ca 8.7, iron 56, ironsat 20%, ferritin 97, wbc 3.94, hgb 10.0, plt 228, ANC 2.59  01/05/2023:  Creatinine 1.26, albumin 4.1, calcium 9.2, alkaline phosphatase 56, total bilirubin 0.2, AST 12, ALT 13, , iron 89, TIBC 418, % saturation 21, ferritin 14, folic acid 10.7, vitamin B12 983, WBC count 3.47, hemoglobin 9.3, MCV 90.5, platelet count 237.  9/8/22 Cr 1.18, Alb 3.9, , iron 56, TIBC 333, Ferritin 34, folic acid 10.87, B12 1872, WBC 3.46, Hgb 11.7,  MCV 93, , retic 2.13  5/6/22 Cr 1.33 Alb 3.9  Iron 77 TIBC 345 Ferritin 42 WBC 3.41 RBC 3.91 Hg 11.9 MCV 94.1 RDW 13.5  Retic 2.65%  1/17/22 Cr 1.24, Alb 4.1, Iron 57, TIBC 324, iron sat 18%, Ferritin 56, folic acid 9.29, B12 1799, WBC 3.60, Hgb 13.0, MCV  93.8,   9/20/21 Cr 1.25 Alb 4.3  Iron 75 TIBC 345 Ferritin 48 WBC 4.09 Hg 13.3   7/20/21 Cr 1.55, Alb 3.8, TP 6.8, iron 47, TIBC 334, Ferritin 30, WBC 3.71, Hgb 12.6, MCV 92.3, RDW 13.0, , retic 1.93%  6/7/21 Cr 1.16, Alb 4.1, TP 7.3, iron 52, TIBC 371, Ferritin 21, Folic acid 13.76, B12 >2000, WBC 3.74, Hgb 11.9, MCV 93.2, RDW 13.7, , retic 3.34  4/12/21 Cr 1.17 Alb 4.2 TP 7.6 Iron 55 TIBC 342. Ferritin 40  Folate 12.68 B12>2000 WBC 3.72 RBC 4.56 Hg 14 MCV 92.1 RDW 13.6  Retic 120k ANC 2.06  2/18/21 Glu 239, Cr 1.24, TP 7.1 Alb 4.0, Ca 9.1 Iron 86 TIBC 322 Iron sat 27% Ferritin 35 Folic acid 17.37, B12 1204, WBC 4.08, Hgb 13.1, , ANC  2.51, retic 2.43%  12/9/20 , CR 1.18, TP 6.5, ALB 3.7, calcium 8.6, iron 100, TIBC 295, iron sat 34%, ferritin 31, folic acid 15.54, B12 1187, WBC 3.64, Hg 12.3, , ANC 2.16, retic 1K  8/18/20 Cr 1.3 Alb 4 TP 6.9 AST 12 ALT 10 Iron 83 TIBC 295 Ferritin 50 Folate 14.91 B12 1671 WBC 4.46 Hg 12.6 RBC 4.13 MCV 95.6 RDW 14.1  Retic % 2.53 Abs retic 100k  3/25/20 Cr 1.13 iron 86 TIBC 278 Ferritin 119 Folate 16 B12 >2000 WBC 2.80 RBC 3.91 Hgb 12.2 MCV 97  retic 2.45% ANC 1.70  1/31/20 Cr 1.23 Alb 4.2 Iron 57 TIBC 285 Ferritin 158 WBC 3.31 Hg 13 RBC 4.16 MCV 97.1  Retic ~85k ANC 1.75  12/6/19 Iron 89, TIBC 288, iron sat 31% Ferritin 378, Folic acid 18.04, b12 1535, Hgb 12.4, , WBC 3.54, ANC 1.96  10/22/19 WBC 3.5 Hg 11.1 MCV 92 RDW 17.6  Ferritin 12 Iron 61 TIBC 378  9/25/19 Iron 55 TIBC 393 Ferritin 32 Folate 18.8 B12 1453  8/13/19 Hg 9.5 MCV 80.4 WBC 4.75 PLT 52k RBC 3.78  8/9/19 Hg 3.2 MCV 65 RBC 1.89 RDW 22.4 PLT 119k    Lab History:  Iron 114, ferritin 10-- Hgb 10.5-- 1/21/2014  Iron 48, ferritin 9, iron sat 12%, Hgb 9.6-- 3/25/2014  Iron 31, ferritin 6, iron sat 8%, Hgb 8.7--5/22/2014  Iron 60, ferritin 49, iron sat 20%, Hgb 12.7- TIBC  305- 7/23/2014  Iron 12, ferritin 5, iron sat 3%, Hgb 6.4 Hct 22.5 TIBC 388--12/22/2014.  Iron 11, TIBC 446, iron sat 2.5%, ferritin 4.8-- 5/28/2015  Iron 75, TIBC 317, iron sat 24%, ferritin 538-- 6/23/2015  Iron 71, TIBC 355, iron sat 20%, ferritin 17--9/03/2015  Iron 107, TIBC 285, iron sat 38%, ferritin 255-- 10/15/2015  Iron 11, TIBC 305, iron sat 4%, ferritin 6 -- 02/12/2016  Iron 126, TIBC 335, iron sat 38%, ferritin 18- 3/14/16  Retic 4%, Ferritin 18- 4/19/16  Ferritin 14, retic 3%-  5/10/16  Iron  141, TIBC 333 , Iron sat  42%, Ferritin 18,  Retic 2.66  -6/6/16  Iron 209, TIBC 360, iron sat 58%, ferritin 21 - 8/30/16  10/2016 Ferritin 14, iron 24, iron sat 7% -->Injectafer ordered 12/7/16 11/29/16 HGB 7.0--:> transfused  1/9/17  HGB 11.8, MCV 96, Ferritin 257  2/20/17 Ferritin 18, HGB 11.8  4/12/17 WBC 3.1, HGB 7.1, MCV 80, PLT 282K, Ferritin 6--> Injectafer 4/12/17 and 4/16/17 4/29/17 Ferritin 266  6/5/17 WBC 2.4, Hgb 7.4, MCV 87, , ferritin 10.--> Injectafer for 6/5/17 and 2 units packed red blood cells  7/5/17 WBC 8.1, Hgb 10.9, PLT 2:30 1K, MCV 96, ferritin 247  9/11/2017 WBC 2.8, Hgb 11.5, , ferritin 421  1/24/17 WBC 3.71, HGB 11.3, PLT 319K Ferritin 319  3/28/18 WBC 2.5, HGB 11.3, MCV 93, PLT 151K Ferritin 294  10/18/2023:  WBC 4.00, Hgb 13.2, Hct 41.0, Plt 170,000, Na+ 135, K+ 5.1, glucose 224 (non-fasting), creatinine 1.13, TIBC 293, Ferritin 168.     Procedures:  Capsule endoscopy report dated 7/12/2023:  Multiple small nonbleeding AVMs in the distal duodenum and proximal jejunum and 1 actively oozing AVM in the proximal.   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 Normal duodenum. Repeat EGD 8/10/19 normal stomach and esophagus with few benign gastric polyps  Colonoscopy 8/13/19 with polyps, internal and external hemorrhoids       Imaging: None    Path:  8/9/19 sigmoid colon biopsy: polypoid colonic mucosa with benign lymphoid aggregate and reactive mucosal changes. no hyperplastic polyp, adenomatous change or malignancy    Past Medical History:   Diagnosis Date    Anemia     Diabetes mellitus     Hyperlipidemia     Hypertension     Thrombocytopenia        Past Surgical History:    Procedure Laterality Date    ANGIOGRAM, CORONARY, WITH LEFT HEART CATHETERIZATION      APPENDECTOMY      Cataract Surgery      CORONARY ANGIOPLASTY WITH STENT PLACEMENT         Family History   Problem Relation Name Age of Onset    Coronary artery disease Mother      Diabetes Mother      Hypertension Mother         Social History     Socioeconomic History    Marital status:    Tobacco Use    Smoking status: Never    Smokeless tobacco: Never   Substance and Sexual Activity    Alcohol use: Not Currently    Drug use: Never    Sexual activity: Not Currently     Social Drivers of Health     Food Insecurity: No Food Insecurity (11/4/2022)    [facility]    Hunger Vital Sign     Worried About Running Out of Food in the Last Year: Never true     Ran Out of Food in the Last Year: Never true   Housing Stability: Unknown (11/4/2022)    Received from [facility], [facility]    Housing Stability Vital Sign     Unable to Pay for Housing in the Last Year: No       Current Outpatient Medications   Medication Sig Dispense Refill    albuterol (PROVENTIL/VENTOLIN HFA) 90 mcg/actuation inhaler Inhale 1 puff into the lungs 2 (two) times daily.      amLODIPine (NORVASC) 5 MG tablet TAKE 1 TABLET BY MOUTH DAILY AT 11 AM      ascorbic acid, vitamin C, (VITAMIN C) 500 MG tablet Take 1 tablet (500 mg) by mouth twice daily with oral iron. 60 tablet 6    carvediloL (COREG) 6.25 MG tablet TAKE 1 TABLET BY MOUTH TWICE DAILY FOR BLOOD PRESSURE AND HEART      COMBIGAN 0.2-0.5 % Drop INSTILL 1 DROP BOTH EYES TWICE DAILY      ferrous gluconate (FERGON) 324 MG tablet Take 1 tablet (324 mg total) by mouth 2 (two) times a  day. Take 2 hours after eating 60 tablet 6    fluticasone-umeclidin-vilanter (TRELEGY ELLIPTA) 200-62.5-25 mcg inhaler Inhale into the lungs.      glipiZIDE (GLUCOTROL) 10 MG tablet Take 10 mg by mouth 2 (two) times daily.      isosorbide mononitrate (IMDUR) 60 MG 24 hr tablet Take 120 mg by mouth nightly.      lisinopriL (PRINIVIL,ZESTRIL) 20 MG tablet Take 20 mg by mouth.      magnesium oxide (MAG-OX) 400 mg (241.3 mg magnesium) tablet Take 1 tablet by mouth 2 (two) times daily.      metFORMIN (GLUCOPHAGE) 500 MG tablet Take 500 mg by mouth.      ranolazine (RANEXA) 1,000 mg Tb12 Take 500 mg by mouth 2 (two) times daily.      ranolazine (RANEXA) 1,000 mg Tb12 Take 1,000 mg by mouth 2 (two) times daily.      rosuvastatin (CRESTOR) 20 MG tablet Take 20 mg by mouth every evening.       No current facility-administered medications for this visit.       Review of patient's allergies indicates:  No Known Allergies          Review of Systems  CONSTITUTIONAL: no fevers, no chills, no weight loss, +mild chronic fatigue, no weakness  HEMATOLOGIC: no abnormal bleeding, no abnormal bruising, no drenching  night sweats  ONCOLOGIC: no new masses or lumps  HEENT: no vision loss, no tinnitus or hearing loss, no nose bleeding, no dysphagia, no odynophagia  CVS: no chest pain, no palpitations, +mild chronic dyspnea on exertion  RESP: + shortness of breath, no hemoptysis, no cough  GI: no nausea, no vomiting, no diarrhea, no constipation, no melena, no hematochezia, no hematemesis, no abdominal pain, no increase in abdominal girth  : no dysuria, no hematuria, no hesitancy, no scrotal swelling, no discharge  INTEGUMENT: no rashes, no abnormal bruising, no nail pitting, no hyperpigmentation  NEURO: no falls, no memory loss, no paresthesias or dysesthesias, no urofecal incontinence or retention, no loss of strength on any extremity  MSK: no back pain, no new joint pain, no joint swelling  PSYCH: no suicidal or homicidal ideation, no  depression, no insomnia, no anhedonia  ENDOCRINE: no heat or cold intolerance, no polyuria, no polydipsia    Physical Exam  Vitals:    12/03/24 1416   BP: 115/63   Pulse: 64   Resp: 18   Temp: 98.5 °F (36.9 °C)       GA: AAOx3, NAD  HEENT:  moist oral mucous membranes  RESP:  Equal chest rise, no accessory muscle use  EXT: no deformities, no pedal edema  SKIN: no rashes, warm and dry  NEURO: normal mentation, no gross neuro deficits        Assessment/Plan  1. Anemia due to iron deficiency D50.0  Given chronic CAD Hg should be >8/dl  MJ is due to recurrent GI blood loss.  Received IV Injectafer x 2 on 11/13/19 and 11/20/19 with appropriate response. IV iron also received 1/2023  Continue to f/u with cardiology as recommended, C/w inhaler PRN SOB  Currently taking oral iron BID. Recommend take 2 hours after eating with vit C.   Injectafer X 2 doses given 7/2024      2. Thrombocytopenia D69.6  Multifactorial. During hospitalization at least partially dilutional due to transfusion of 6 u of PRBC as well as consumptive due to ongoing bleeding. Also likely due to medications including lasix, PPI and h2 blockers  PLT count has now normalized      Plan   Labs reviewed and noted ferritin of 37, saturation 22, hemoglobin 12.0.  Continue oral iron.  Recommend patient follow-up with MATTIE Stahl.   Follow-up in clinic in 8 weeks for office visit and repeat CBC, CMP, iron panel, ferritin, will repeat B12 and folic acid      The patient is in agreement with today's plan of care.  All questions answered.  Patient is aware to contact our office for any problems or concerns prior to next visit.

## 2025-02-05 ENCOUNTER — OFFICE VISIT (OUTPATIENT)
Dept: HEMATOLOGY/ONCOLOGY | Facility: CLINIC | Age: 85
End: 2025-02-05
Payer: MEDICARE

## 2025-02-05 VITALS
TEMPERATURE: 98 F | OXYGEN SATURATION: 99 % | DIASTOLIC BLOOD PRESSURE: 71 MMHG | HEIGHT: 68 IN | BODY MASS INDEX: 27.57 KG/M2 | HEART RATE: 60 BPM | SYSTOLIC BLOOD PRESSURE: 145 MMHG | WEIGHT: 181.88 LBS | RESPIRATION RATE: 18 BRPM

## 2025-02-05 DIAGNOSIS — D50.0 IRON DEFICIENCY ANEMIA DUE TO CHRONIC BLOOD LOSS: Primary | ICD-10-CM

## 2025-02-05 PROCEDURE — 99214 OFFICE O/P EST MOD 30 MIN: CPT | Mod: ,,,

## 2025-02-05 RX ORDER — EPINEPHRINE 0.3 MG/.3ML
0.3 INJECTION SUBCUTANEOUS ONCE AS NEEDED
Status: CANCELLED | OUTPATIENT
Start: 2025-02-06

## 2025-02-05 RX ORDER — SODIUM CHLORIDE 0.9 % (FLUSH) 0.9 %
10 SYRINGE (ML) INJECTION
OUTPATIENT
Start: 2025-02-06

## 2025-02-05 RX ORDER — HEPARIN 100 UNIT/ML
500 SYRINGE INTRAVENOUS
OUTPATIENT
Start: 2025-02-06

## 2025-02-05 RX ORDER — DIPHENHYDRAMINE HYDROCHLORIDE 50 MG/ML
50 INJECTION INTRAMUSCULAR; INTRAVENOUS ONCE AS NEEDED
Status: CANCELLED | OUTPATIENT
Start: 2025-02-06

## 2025-02-05 NOTE — PROGRESS NOTES
PCP: Dr. 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 Lost to follow up    Has previously received multiple courses of IV iron. Multiple PRBC transfusions.    In 6/2019 he had EGD with Dr Fajardo that showed hiatal hernia, schatzki's ring, esophagitis, gastric ulcer and gastritis.    Most recently presented to [facility] 8/2019 with CP and found to have severe anemia with Hg of 3.2, FOBT+. He was admitted and received a total of 6u of PRBC. He had EGD with Dr Hilario and found to have a few benign sessile polyps in antrum and fundus. He also had colonoscopy with Dr Fajardo that showed colon polyp and internal and external hemorrhoids. 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 During admission he was noted to have thrombocytopenia as well w/ PLT count dropping to ~50k prior to discharge.    He was referred for outpatient capsule endoscopy which as per pt he had 9/2019, results not available.    Interval History:    Today, 02/05/25, patient was any acute concerns.  He believes fatigue has improved slightly despite low iron levels. He denies any chest pain, shortness of breath, blood in his stools, dark tarry stools.  He reports compliance with oral iron BID.  He reports tolerating it well without any side effects of bloating, heartburn or constipation.  He denies any new medications, ER or hospital visits since last follow-up with us.      Labs:    01/28/25: cr 1.37, iron 30, ironsat 9%, ferritin 13, wbc 4.46, hgb 10.9, plt 223, ANC 2.88  11/25/2024 creatinine 1.3, LFTs unremarkable, Iron 78, TIBC 360, % saturation 22, ferritin 37, folic acid 12.7, WBC count 4.3, hemoglobin  12.0, MCV 92.8, platelet count 200, ANC 2.6.    09/23/24: CMP WNL, iron 40, iron sat 13%, ferritin 58, folic acid 11.02, B12 540, wbc 4.04, hgb 11.5, plt 215  06/25/24 cr 1.33, iron 138, ironsat 37% ferritin 12, folic acid 12.25, B12 647, wbc 3.88, hgb 11.1, plt 206, ANC 2.19  03/26/24: CMP unremarkable, iron 71, [id number]%, ferritin 30, wbc 3.79, hgb 11.3, plt 196,   12/26/23: iron 52, iron sat 16, ferritin 61, wbc 3.50, hgb 12.4, plt 190, ANC 1.88  10/18/23: iron 74, ferritin 168, wbc 4.00, hgb 13.2, plt 170  08/09/2023: CMP unremarkable, iron59, iron sat 16%, ferritin 14, wbc 3.21, hgb 9.2, plt 233, ANC 1.84  06/2023: CMP unremarkable, iron 115, iron sat 31%, ferritin 17, wbc 3.59, hgb 10.1, plt 187, ANC 2.07   05/09/23: CMP unremarkable, iron 67, iron sat 21%, ferritin 21, wbc 4.82, hgb 11.1, plt 203, ANC 3.01  03/07/23: cr 1.16, ca 8.7, iron 56, ironsat 20%, ferritin 97, wbc 3.94, hgb 10.0, plt 228, ANC 2.59  01/05/2023:  Creatinine 1.26, albumin 4.1, calcium 9.2, alkaline phosphatase 56, total bilirubin 0.2, AST 12, ALT 13, , iron 89, TIBC 418, % saturation 21, ferritin 14, folic acid 10.7, vitamin B12 983, WBC count 3.47, hemoglobin 9.3, MCV 90.5, platelet count 237.  9/8/22 Cr 1.18, Alb 3.9, , iron 56, TIBC 333, Ferritin 34, folic acid 10.87, B12 1872, WBC 3.46, Hgb 11.7,  MCV 93, , retic 2.13  5/6/22 Cr 1.33 Alb 3.9  Iron 77 TIBC 345 Ferritin 42 WBC 3.41 RBC 3.91 Hg 11.9 MCV 94.1 RDW 13.5  Retic 2.65%  1/17/22 Cr 1.24, Alb 4.1, Iron 57, TIBC 324, iron sat 18%, Ferritin 56, folic acid 9.29, B12 1799, WBC 3.60, Hgb 13.0, MCV  93.8,   9/20/21 Cr 1.25 Alb 4.3  Iron 75 TIBC 345 Ferritin 48 WBC 4.09 Hg 13.3   7/20/21 Cr 1.55, Alb 3.8, TP 6.8, iron 47, TIBC 334, Ferritin 30, WBC 3.71, Hgb 12.6, MCV 92.3, RDW 13.0, , retic 1.93%  6/7/21 Cr 1.16, Alb 4.1, TP 7.3, iron 52, TIBC 371, Ferritin 21, Folic acid 13.76, B12 >2000, WBC 3.74, Hgb 11.9, MCV 93.2,  RDW 13.7, , retic 3.34  4/12/21 Cr 1.17 Alb 4.2 TP 7.6 Iron 55 TIBC 342. Ferritin 40 Folate 12.68 B12>2000 WBC 3.72 RBC 4.56 Hg 14 MCV 92.1 RDW 13.6  Retic 120k ANC 2.06  2/18/21 Glu 239, Cr 1.24, TP 7.1 Alb 4.0, Ca 9.1 Iron 86 TIBC 322 Iron sat 27% Ferritin 35 Folic acid 17.37, B12 1204, WBC 4.08, Hgb 13.1, , ANC  2.51, retic 2.43%  12/9/20 , CR 1.18, TP 6.5, ALB 3.7, calcium 8.6, iron 100, TIBC 295, iron sat 34%, ferritin 31, folic acid 15.54, B12 1187, WBC 3.64, Hg 12.3, , ANC 2.16, retic 1K  8/18/20 Cr 1.3 Alb 4 TP 6.9 AST 12 ALT 10 Iron 83 TIBC 295 Ferritin 50 Folate 14.91 B12 1671 WBC 4.46 Hg 12.6 RBC 4.13 MCV 95.6 RDW 14.1  Retic % 2.53 Abs retic 100k  3/25/20 Cr 1.13 iron 86 TIBC 278 Ferritin 119 Folate 16 B12 >2000 WBC 2.80 RBC 3.91 Hgb 12.2 MCV 97  retic 2.45% ANC 1.70  1/31/20 Cr 1.23 Alb 4.2 Iron 57 TIBC 285 Ferritin 158 WBC 3.31 Hg 13 RBC 4.16 MCV 97.1  Retic ~85k ANC 1.75  12/6/19 Iron 89, TIBC 288, iron sat 31% Ferritin 378, Folic acid 18.04, b12 1535, Hgb 12.4, , WBC 3.54, ANC 1.96  10/22/19 WBC 3.5 Hg 11.1 MCV 92 RDW 17.6  Ferritin 12 Iron 61 TIBC 378  9/25/19 Iron 55 TIBC 393 Ferritin 32 Folate 18.8 B12 1453  8/13/19 Hg 9.5 MCV 80.4 WBC 4.75 PLT 52k RBC 3.78  8/9/19 Hg 3.2 MCV 65 RBC 1.89 RDW 22.4 PLT 119k    Lab History:  Iron 114, ferritin 10-- Hgb 10.5-- 1/21/2014  Iron 48, ferritin 9, iron sat 12%, Hgb 9.6-- 3/25/2014  Iron 31, ferritin 6, iron sat 8%, Hgb 8.7--5/22/2014  Iron 60, ferritin 49, iron sat 20%, Hgb 12.7- TIBC  305- 7/23/2014  Iron 12, ferritin 5, iron sat 3%, Hgb 6.4 Hct 22.5 TIBC 388--12/22/2014.  Iron 11, TIBC 446, iron sat 2.5%, ferritin 4.8-- 5/28/2015  Iron 75, TIBC 317, iron sat 24%, ferritin 538-- 6/23/2015  Iron 71, TIBC 355, iron sat 20%, ferritin 17--9/03/2015  Iron 107, TIBC 285, iron sat 38%, ferritin 255-- 10/15/2015  Iron 11, TIBC 305, iron sat 4%, ferritin 6 -- 02/12/2016  Iron 126, TIBC 335, iron  sat 38%, ferritin 18- 3/14/16  Retic 4%, Ferritin 18- 4/19/16  Ferritin 14, retic 3%- 5/10/16  Iron  141, TIBC 333 , Iron sat  42%, Ferritin 18,  Retic 2.66  -6/6/16  Iron 209, TIBC 360, iron sat 58%, ferritin 21 - 8/30/16  10/2016 Ferritin 14, iron 24, iron sat 7% -->Injectafer ordered 12/7/16 11/29/16 HGB 7.0--:> transfused  1/9/17  HGB 11.8, MCV 96, Ferritin 257  2/20/17 Ferritin 18, HGB 11.8  4/12/17 WBC 3.1, HGB 7.1, MCV 80, PLT 282K, Ferritin 6--> Injectafer 4/12/17 and 4/16/17 4/29/17 Ferritin 266  6/5/17 WBC 2.4, Hgb 7.4, MCV 87, , ferritin 10.--> Injectafer for 6/5/17 and 2 units packed red blood cells  7/5/17 WBC 8.1, Hgb 10.9, PLT 2:30 1K, MCV 96, ferritin 247  9/11/2017 WBC 2.8, Hgb 11.5, , ferritin 421  1/24/17 WBC 3.71, HGB 11.3, PLT 319K Ferritin 319  3/28/18 WBC 2.5, HGB 11.3, MCV 93, PLT 151K Ferritin 294  10/18/2023:  WBC 4.00, Hgb 13.2, Hct 41.0, Plt 170,000, Na+ 135, K+ 5.1, glucose 224 (non-fasting), creatinine 1.13, TIBC 293, Ferritin 168.     Procedures:  Capsule endoscopy report dated 7/12/2023:  Multiple small nonbleeding AVMs in the distal duodenum and proximal jejunum and 1 actively oozing AVM in the proximal.   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 Normal duodenum. Repeat EGD 8/10/19 normal stomach and esophagus with few benign gastric polyps  Colonoscopy 8/13/19 with polyps, internal and external hemorrhoids       Imaging: None    Path:  8/9/19 sigmoid colon biopsy: polypoid colonic mucosa with benign lymphoid aggregate and reactive mucosal changes. no hyperplastic polyp, adenomatous change or malignancy    Past Medical History:   Diagnosis Date    Anemia     Diabetes mellitus      Hyperlipidemia     Hypertension     Thrombocytopenia        Past Surgical History:   Procedure Laterality Date    ANGIOGRAM, CORONARY, WITH LEFT HEART CATHETERIZATION      APPENDECTOMY      Cataract Surgery      CORONARY ANGIOPLASTY WITH STENT PLACEMENT         Family History   Problem Relation Name Age of Onset    Coronary artery disease Mother      Diabetes Mother      Hypertension Mother         Social History     Socioeconomic History    Marital status:    Tobacco Use    Smoking status: Never    Smokeless tobacco: Never   Substance and Sexual Activity    Alcohol use: Not Currently    Drug use: Never    Sexual activity: Not Currently     Social Drivers of Health     Food Insecurity: No Food Insecurity (11/4/2022)    Received from [facility], [facility]    Hunger Vital Sign     Worried About Running Out of Food in the Last Year: Never true     Ran Out of Food in the Last Year: Never true   Housing Stability: Unknown (11/4/2022)    Received from [facility], [facility]    Housing Stability Vital Sign     Unable to Pay for Housing in the Last Year: No       Current Outpatient Medications   Medication Sig Dispense Refill    albuterol (PROVENTIL/VENTOLIN HFA) 90 mcg/actuation inhaler Inhale 1 puff into the lungs 2 (two) times daily.      amLODIPine (NORVASC) 5 MG tablet TAKE 1 TABLET BY MOUTH DAILY AT 11 AM      ascorbic acid, vitamin C, (VITAMIN C) 500 MG tablet Take 1 tablet (500 mg) by mouth twice daily with oral iron. 60 tablet 6    carvediloL (COREG) 6.25 MG tablet TAKE 1 TABLET BY MOUTH TWICE DAILY FOR BLOOD PRESSURE AND HEART      COMBIGAN 0.2-0.5 % Drop INSTILL 1 DROP BOTH EYES TWICE DAILY      ferrous  gluconate (FERGON) 324 MG tablet Take 1 tablet (324 mg total) by mouth 2 (two) times a day. Take 2 hours after eating 60 tablet 6    fluticasone-umeclidin-vilanter (TRELEGY ELLIPTA) 200-62.5-25 mcg inhaler Inhale into the lungs.      glipiZIDE (GLUCOTROL) 10 MG tablet Take 10 mg by mouth 2 (two) times daily.      isosorbide mononitrate (IMDUR) 60 MG 24 hr tablet Take 120 mg by mouth nightly.      lisinopriL (PRINIVIL,ZESTRIL) 20 MG tablet Take 20 mg by mouth.      magnesium oxide (MAG-OX) 400 mg (241.3 mg magnesium) tablet Take 1 tablet by mouth 2 (two) times daily.      metFORMIN (GLUCOPHAGE) 500 MG tablet Take 500 mg by mouth.      ranolazine (RANEXA) 1,000 mg Tb12 Take 500 mg by mouth 2 (two) times daily.      ranolazine (RANEXA) 1,000 mg Tb12 Take 1,000 mg by mouth 2 (two) times daily.      rosuvastatin (CRESTOR) 20 MG tablet Take 20 mg by mouth every evening.       No current facility-administered medications for this visit.       Review of patient's allergies indicates:  No Known Allergies          Review of Systems  CONSTITUTIONAL: no fevers, no chills, no weight loss, +mild chronic fatigue, no weakness  HEMATOLOGIC: no abnormal bleeding, no abnormal bruising, no drenching  night sweats  ONCOLOGIC: no new masses or lumps  HEENT: no vision loss, no tinnitus or hearing loss, no nose bleeding, no dysphagia, no odynophagia  CVS: no chest pain, no palpitations, +mild chronic dyspnea on exertion  RESP: + shortness of breath, no hemoptysis, no cough  GI: no nausea, no vomiting, no diarrhea, no constipation, no melena, no hematochezia, no hematemesis, no abdominal pain, no increase in abdominal girth  : no dysuria, no hematuria, no hesitancy, no scrotal swelling, no discharge  INTEGUMENT: no rashes, no abnormal bruising, no nail pitting, no hyperpigmentation  NEURO: no falls, no memory loss, no paresthesias or dysesthesias, no urofecal incontinence or retention, no loss of strength on any extremity  MSK: no back  pain, no new joint pain, no joint swelling  PSYCH: no suicidal or homicidal ideation, no depression, no insomnia, no anhedonia  ENDOCRINE: no heat or cold intolerance, no polyuria, no polydipsia    Physical Exam  Vitals:    02/05/25 1418   Pulse: 60   Resp: 18   Temp: 97.8 °F (36.6 °C)       GA: AAOx3, NAD  HEENT:  moist oral mucous membranes  RESP:  Equal chest rise, no accessory muscle use  EXT: no deformities, no pedal edema  SKIN: no rashes, warm and dry  NEURO: normal mentation, no gross neuro deficits        Assessment/Plan  1. Anemia due to iron deficiency D50.0  Given chronic CAD Hg should be >8/dl  MJ is due to recurrent GI blood loss.  Received IV Injectafer x 2 on 11/13/19 and 11/20/19 with appropriate response. IV iron also received 1/2023  Continue to f/u with cardiology as recommended, C/w inhaler PRN SOB  Currently taking oral iron BID. Recommend take 2 hours after eating with vit C.   Injectafer X 2 doses given 7/2024      2. Thrombocytopenia D69.6  Multifactorial. During hospitalization at least partially dilutional due to transfusion of 6 u of PRBC as well as consumptive due to ongoing bleeding. Also likely due to medications including lasix, PPI and h2 blockers  PLT count has now normalized      Plan   Labs reviewed, noted ironsat 9% and ferritn 13 with hgb 10.9  Will plan for iron repletion with Injectafer X 2 doses  Continue oral iron BID w/ vit C  (May stop during IV iron repletion).   Recommend patient follow-up with MATTIE Stahl.   Follow-up in clinic in 10 weeks for office visit and repeat CBC, CMP, iron panel, ferritin, B12 and folic acid      The patient is in agreement with today's plan of care.  All questions answered.  Patient is aware to contact our office for any problems or concerns prior to next visit.

## 2025-02-20 RX ORDER — EPINEPHRINE 0.3 MG/.3ML
0.3 INJECTION SUBCUTANEOUS ONCE AS NEEDED
OUTPATIENT
Start: 2025-02-20

## 2025-02-20 RX ORDER — DIPHENHYDRAMINE HYDROCHLORIDE 50 MG/ML
50 INJECTION INTRAMUSCULAR; INTRAVENOUS ONCE AS NEEDED
OUTPATIENT
Start: 2025-02-20

## 2025-03-26 DIAGNOSIS — D50.0 IRON DEFICIENCY ANEMIA DUE TO CHRONIC BLOOD LOSS: ICD-10-CM

## 2025-03-27 RX ORDER — FERROUS GLUCONATE 324(38)MG
324 TABLET ORAL 2 TIMES DAILY
Qty: 60 TABLET | Refills: 6 | Status: SHIPPED | OUTPATIENT
Start: 2025-03-27

## 2025-04-16 ENCOUNTER — OFFICE VISIT (OUTPATIENT)
Dept: HEMATOLOGY/ONCOLOGY | Facility: CLINIC | Age: 85
End: 2025-04-16
Payer: MEDICARE

## 2025-04-16 VITALS
BODY MASS INDEX: 27.76 KG/M2 | HEIGHT: 68 IN | OXYGEN SATURATION: 100 % | WEIGHT: 183.13 LBS | TEMPERATURE: 98 F | DIASTOLIC BLOOD PRESSURE: 66 MMHG | HEART RATE: 57 BPM | SYSTOLIC BLOOD PRESSURE: 127 MMHG | RESPIRATION RATE: 16 BRPM

## 2025-04-16 DIAGNOSIS — D50.0 IRON DEFICIENCY ANEMIA DUE TO CHRONIC BLOOD LOSS: Primary | ICD-10-CM

## 2025-04-16 DIAGNOSIS — K55.20 AVM (ARTERIOVENOUS MALFORMATION) OF SMALL BOWEL, ACQUIRED: ICD-10-CM

## 2025-04-16 PROCEDURE — 99214 OFFICE O/P EST MOD 30 MIN: CPT | Mod: ,,,

## 2025-04-16 NOTE — PROGRESS NOTES
PCP: Dr. Tha Hopkins (cardiologist)    Chief complaint:   No chief complaint on file.        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 Lost to follow up    Has previously received multiple courses of IV iron. Multiple PRBC transfusions.    In 6/2019 he had EGD with Dr Fajardo that showed hiatal hernia, schatzki's ring, esophagitis, gastric ulcer and gastritis.    Most recently presented to [facility] ER 8/2019 with CP and found to have severe anemia with Hg of 3.2, FOBT+. He was admitted and received a total of 6u of PRBC. He had EGD with Dr Hilario and found to have a few benign sessile polyps in antrum and fundus. He also had colonoscopy with Dr Fajardo that showed colon polyp and internal and external hemorrhoids. 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 During admission he was noted to have thrombocytopenia as well w/ PLT count dropping to ~50k prior to discharge.    He was referred for outpatient capsule endoscopy which as per pt he had 9/2019, results not available.    Interval History:    Today, 04/16/25, patient was any acute concerns. He continues with fatigue despite receiving Injectafer 2/2025. He does report dark stools, however, he does take oral iron daily. We discussed his hx of AVM's diagnosed from capsule endoscopy 7/2023. I do not believe he had follow-up with Dr. Escalera's office, as recommended, for ablation of actively oozing proximal jejunum AVM. I recommend he return for follow-up and evaluation. He reports compliance with oral iron BID.  He reports tolerating it well without any side effects of bloating, heartburn or constipation.  He denies any new medications, ER or hospital visits since last follow-up with us.      Labs:    04/09/2025: CMP unremarkable,  iron 33, ironsat 26%, ferritin 106, folic acid 11.12, B12 553, wbc 3.77, hgb 11.7, plt 185, ANC 2.37  01/28/25: cr 1.37, iron 30, ironsat 9%, ferritin 13, wbc 4.46, hgb 10.9, plt 223, ANC 2.88  11/25/2024 creatinine 1.3, LFTs unremarkable, Iron 78, TIBC 360, % saturation 22, ferritin 37, folic acid 12.7, WBC count 4.3, hemoglobin 12.0, MCV 92.8, platelet count 200, ANC 2.6.    09/23/24: CMP WNL, iron 40, iron sat 13%, ferritin 58, folic acid 11.02, B12 540, wbc 4.04, hgb 11.5, plt 215  06/25/24 cr 1.33, iron 138, ironsat 37% ferritin 12, folic acid 12.25, B12 647, wbc 3.88, hgb 11.1, plt 206, ANC 2.19  03/26/24: CMP unremarkable, iron 71, [id number]%, ferritin 30, wbc 3.79, hgb 11.3, plt 196,   12/26/23: iron 52, iron sat 16, ferritin 61, wbc 3.50, hgb 12.4, plt 190, ANC 1.88  10/18/23: iron 74, ferritin 168, wbc 4.00, hgb 13.2, plt 170  08/09/2023: CMP unremarkable, iron59, iron sat 16%, ferritin 14, wbc 3.21, hgb 9.2, plt 233, ANC 1.84  06/2023: CMP unremarkable, iron 115, iron sat 31%, ferritin 17, wbc 3.59, hgb 10.1, plt 187, ANC 2.07   05/09/23: CMP unremarkable, iron 67, iron sat 21%, ferritin 21, wbc 4.82, hgb 11.1, plt 203, ANC 3.01  03/07/23: cr 1.16, ca 8.7, iron 56, ironsat 20%, ferritin 97, wbc 3.94, hgb 10.0, plt 228, ANC 2.59  01/05/2023:  Creatinine 1.26, albumin 4.1, calcium 9.2, alkaline phosphatase 56, total bilirubin 0.2, AST 12, ALT 13, , iron 89, TIBC 418, % saturation 21, ferritin 14, folic acid 10.7, vitamin B12 983, WBC count 3.47, hemoglobin 9.3, MCV 90.5, platelet count 237.  9/8/22 Cr 1.18, Alb 3.9, , iron 56, TIBC 333, Ferritin 34, folic acid 10.87, B12 1872, WBC 3.46, Hgb 11.7,  MCV 93, , retic 2.13  5/6/22 Cr 1.33 Alb 3.9  Iron 77 TIBC 345 Ferritin 42 WBC 3.41 RBC 3.91 Hg 11.9 MCV 94.1 RDW 13.5  Retic 2.65%  1/17/22 Cr 1.24, Alb 4.1, Iron 57, TIBC 324, iron sat 18%, Ferritin 56, folic acid 9.29, B12 1799, WBC 3.60, Hgb 13.0, MCV  93.8,   9/20/21  Cr 1.25 Alb 4.3  Iron 75 TIBC 345 Ferritin 48 WBC 4.09 Hg 13.3   7/20/21 Cr 1.55, Alb 3.8, TP 6.8, iron 47, TIBC 334, Ferritin 30, WBC 3.71, Hgb 12.6, MCV 92.3, RDW 13.0, , retic 1.93%  6/7/21 Cr 1.16, Alb 4.1, TP 7.3, iron 52, TIBC 371, Ferritin 21, Folic acid 13.76, B12 >2000, WBC 3.74, Hgb 11.9, MCV 93.2, RDW 13.7, , retic 3.34  4/12/21 Cr 1.17 Alb 4.2 TP 7.6 Iron 55 TIBC 342. Ferritin 40 Folate 12.68 B12>2000 WBC 3.72 RBC 4.56 Hg 14 MCV 92.1 RDW 13.6  Retic 120k ANC 2.06  2/18/21 Glu 239, Cr 1.24, TP 7.1 Alb 4.0, Ca 9.1 Iron 86 TIBC 322 Iron sat 27% Ferritin 35 Folic acid 17.37, B12 1204, WBC 4.08, Hgb 13.1, , ANC  2.51, retic 2.43%  12/9/20 , CR 1.18, TP 6.5, ALB 3.7, calcium 8.6, iron 100, TIBC 295, iron sat 34%, ferritin 31, folic acid 15.54, B12 1187, WBC 3.64, Hg 12.3, , ANC 2.16, retic 1K  8/18/20 Cr 1.3 Alb 4 TP 6.9 AST 12 ALT 10 Iron 83 TIBC 295 Ferritin 50 Folate 14.91 B12 1671 WBC 4.46 Hg 12.6 RBC 4.13 MCV 95.6 RDW 14.1  Retic % 2.53 Abs retic 100k  3/25/20 Cr 1.13 iron 86 TIBC 278 Ferritin 119 Folate 16 B12 >2000 WBC 2.80 RBC 3.91 Hgb 12.2 MCV 97  retic 2.45% ANC 1.70  1/31/20 Cr 1.23 Alb 4.2 Iron 57 TIBC 285 Ferritin 158 WBC 3.31 Hg 13 RBC 4.16 MCV 97.1  Retic ~85k ANC 1.75  12/6/19 Iron 89, TIBC 288, iron sat 31% Ferritin 378, Folic acid 18.04, b12 1535, Hgb 12.4, , WBC 3.54, ANC 1.96  10/22/19 WBC 3.5 Hg 11.1 MCV 92 RDW 17.6  Ferritin 12 Iron 61 TIBC 378  9/25/19 Iron 55 TIBC 393 Ferritin 32 Folate 18.8 B12 1453  8/13/19 Hg 9.5 MCV 80.4 WBC 4.75 PLT 52k RBC 3.78  8/9/19 Hg 3.2 MCV 65 RBC 1.89 RDW 22.4 PLT 119k    Lab History:  Iron 114, ferritin 10-- Hgb 10.5-- 1/21/2014  Iron 48, ferritin 9, iron sat 12%, Hgb 9.6-- 3/25/2014  Iron 31, ferritin 6, iron sat 8%, Hgb 8.7--5/22/2014  Iron 60, ferritin 49, iron sat 20%, Hgb 12.7- TIBC  305- 7/23/2014  Iron 12, ferritin 5, iron sat 3%, Hgb 6.4 Hct 22.5 TIBC  388--12/22/2014.  Iron 11, TIBC 446, iron sat 2.5%, ferritin 4.8-- 5/28/2015  Iron 75, TIBC 317, iron sat 24%, ferritin 538-- 6/23/2015  Iron 71, TIBC 355, iron sat 20%, ferritin 17--9/03/2015  Iron 107, TIBC 285, iron sat 38%, ferritin 255-- 10/15/2015  Iron 11, TIBC 305, iron sat 4%, ferritin 6 -- 02/12/2016  Iron 126, TIBC 335, iron sat 38%, ferritin 18- 3/14/16  Retic 4%, Ferritin 18- 4/19/16  Ferritin 14, retic 3%- 5/10/16  Iron  141, TIBC 333 , Iron sat  42%, Ferritin 18,  Retic 2.66  -6/6/16  Iron 209, TIBC 360, iron sat 58%, ferritin 21 - 8/30/16  10/2016 Ferritin 14, iron 24, iron sat 7% -->Injectafer ordered 12/7/16 11/29/16 HGB 7.0--:> transfused  1/9/17  HGB 11.8, MCV 96, Ferritin 257  2/20/17 Ferritin 18, HGB 11.8  4/12/17 WBC 3.1, HGB 7.1, MCV 80, PLT 282K, Ferritin 6--> Injectafer 4/12/17 and 4/16/17 4/29/17 Ferritin 266  6/5/17 WBC 2.4, Hgb 7.4, MCV 87, , ferritin 10.--> Injectafer for 6/5/17 and 2 units packed red blood cells  7/5/17 WBC 8.1, Hgb 10.9, PLT 2:30 1K, MCV 96, ferritin 247  9/11/2017 WBC 2.8, Hgb 11.5, , ferritin 421  1/24/17 WBC 3.71, HGB 11.3, PLT 319K Ferritin 319  3/28/18 WBC 2.5, HGB 11.3, MCV 93, PLT 151K Ferritin 294  10/18/2023:  WBC 4.00, Hgb 13.2, Hct 41.0, Plt 170,000, Na+ 135, K+ 5.1, glucose 224 (non-fasting), creatinine 1.13, TIBC 293, Ferritin 168.     Procedures:  Capsule endoscopy report dated 7/12/2023:  Multiple small nonbleeding AVMs in the distal duodenum and proximal jejunum and 1 actively oozing AVM in the proximal.   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 Normal duodenum. Repeat EGD 8/10/19 normal stomach and esophagus with few benign gastric  polyps  Colonoscopy 8/13/19 with polyps, internal and external hemorrhoids       Imaging: None    Path:  8/9/19 sigmoid colon biopsy: polypoid colonic mucosa with benign lymphoid aggregate and reactive mucosal changes. no hyperplastic polyp, adenomatous change or malignancy    Past Medical History:   Diagnosis Date    Anemia     Diabetes mellitus     Hyperlipidemia     Hypertension     Thrombocytopenia        Past Surgical History:   Procedure Laterality Date    ANGIOGRAM, CORONARY, WITH LEFT HEART CATHETERIZATION      APPENDECTOMY      Cataract Surgery      CORONARY ANGIOPLASTY WITH STENT PLACEMENT         Family History   Problem Relation Name Age of Onset    Coronary artery disease Mother      Diabetes Mother      Hypertension Mother         Social History     Socioeconomic History    Marital status:    Tobacco Use    Smoking status: Never    Smokeless tobacco: Never   Substance and Sexual Activity    Alcohol use: Not Currently    Drug use: Never    Sexual activity: Not Currently     Social Drivers of Health     Food Insecurity: No Food Insecurity (11/4/2022)    [facility] Subsidiaries and Affiliates    Hunger Vital Sign     Worried About Running Out of Food in the Last Year: Never true     Ran Out of Food in the Last Year: Never true   Housing Stability: Unknown (11/4/2022)    Received from [facility] and Its Subsidiaries and Affiliates    Housing Stability Vital Sign     Unable to Pay for Housing in the Last Year: No       Current Outpatient Medications   Medication Sig Dispense Refill    albuterol (PROVENTIL/VENTOLIN HFA) 90 mcg/actuation inhaler Inhale 1 puff into the lungs 2 (two) times daily.      amLODIPine (NORVASC) 5 MG tablet TAKE 1 TABLET BY MOUTH DAILY AT 11 AM      ascorbic acid, vitamin C, (VITAMIN C) 500 MG tablet Take 1 tablet (500 mg) by mouth twice daily with oral iron. 60 tablet 6     carvediloL (COREG) 6.25 MG tablet TAKE 1 TABLET BY MOUTH TWICE DAILY FOR BLOOD PRESSURE AND HEART      COMBIGAN 0.2-0.5 % Drop INSTILL 1 DROP BOTH EYES TWICE DAILY      ferrous gluconate (FERGON) 324 MG tablet Take 1 tablet (324 mg total) by mouth 2 (two) times a day. Take 2 hours after eating 60 tablet 6    fluticasone-umeclidin-vilanter (TRELEGY ELLIPTA) 200-62.5-25 mcg inhaler Inhale into the lungs.      glipiZIDE (GLUCOTROL) 10 MG tablet Take 10 mg by mouth 2 (two) times daily.      isosorbide mononitrate (IMDUR) 60 MG 24 hr tablet Take 120 mg by mouth nightly.      lisinopriL (PRINIVIL,ZESTRIL) 20 MG tablet Take 20 mg by mouth.      magnesium oxide (MAG-OX) 400 mg (241.3 mg magnesium) tablet Take 1 tablet by mouth 2 (two) times daily.      metFORMIN (GLUCOPHAGE) 500 MG tablet Take 500 mg by mouth.      ranolazine (RANEXA) 1,000 mg Tb12 Take 500 mg by mouth 2 (two) times daily.      ranolazine (RANEXA) 1,000 mg Tb12 Take 1,000 mg by mouth 2 (two) times daily.      rosuvastatin (CRESTOR) 20 MG tablet Take 20 mg by mouth every evening.       No current facility-administered medications for this visit.       Review of patient's allergies indicates:  No Known Allergies          Review of Systems  CONSTITUTIONAL: no fevers, no chills, no weight loss, +mild chronic fatigue, no weakness  HEMATOLOGIC: no abnormal bleeding, no abnormal bruising, no drenching  night sweats  ONCOLOGIC: no new masses or lumps  HEENT: no vision loss, no tinnitus or hearing loss, no nose bleeding, no dysphagia, no odynophagia  CVS: no chest pain, no palpitations, +mild chronic dyspnea on exertion  RESP: + shortness of breath, no hemoptysis, no cough  GI: no nausea, no vomiting, no diarrhea, no constipation, no melena, no hematochezia, no hematemesis, no abdominal pain, no increase in abdominal girth  : no dysuria, no hematuria, no hesitancy, no scrotal swelling, no discharge  INTEGUMENT: no rashes, no abnormal bruising, no nail pitting, no  hyperpigmentation  NEURO: no falls, no memory loss, no paresthesias or dysesthesias, no urofecal incontinence or retention, no loss of strength on any extremity  MSK: no back pain, no new joint pain, no joint swelling  PSYCH: no suicidal or homicidal ideation, no depression, no insomnia, no anhedonia  ENDOCRINE: no heat or cold intolerance, no polyuria, no polydipsia    Physical Exam  Vitals:    04/16/25 1318   BP: 127/66   Pulse: (!) 57   Resp: 16   Temp: 97.7 °F (36.5 °C)         GA: AAOx3, NAD  HEENT:  moist oral mucous membranes  RESP:  Equal chest rise, no accessory muscle use  EXT: no deformities, no pedal edema  SKIN: no rashes, warm and dry  NEURO: normal mentation, no gross neuro deficits        Assessment/Plan  1. Anemia due to iron deficiency D50.0  Given chronic CAD Hg should be >8/dl  MJ is due to recurrent GI blood loss.  Received IV Injectafer x 2 on 11/13/19 and 11/20/19 with appropriate response. IV iron also received 1/2023  Continue to f/u with cardiology as recommended, C/w inhaler PRN SOB  Currently taking oral iron BID. Recommend take 2 hours after eating with vit C.   Injectafer X 2 doses given 7/2024      2. Thrombocytopenia D69.6  Multifactorial. During hospitalization at least partially dilutional due to transfusion of 6 u of PRBC as well as consumptive due to ongoing bleeding. Also likely due to medications including lasix, PPI and h2 blockers  PLT count has now normalized      Plan   Labs reviewed, noted improved ironsat 26% and ferritn 106 with hgb 11.7 s/p Injectafer  Continue oral iron BID w/ vit C  (May stop during IV iron repletion).   Referral to Dr. Escalera for evaluation of AVM's and possible ablation  Follow-up in clinic in 8 weeks for office visit and repeat CBC, CMP, iron panel, ferritin      The patient is in agreement with today's plan of care.  All questions answered.  Patient is aware to contact our office for any problems or concerns prior to next visit.

## 2025-06-11 ENCOUNTER — OFFICE VISIT (OUTPATIENT)
Dept: HEMATOLOGY/ONCOLOGY | Facility: CLINIC | Age: 85
End: 2025-06-11
Payer: MEDICARE

## 2025-06-11 VITALS
DIASTOLIC BLOOD PRESSURE: 78 MMHG | TEMPERATURE: 98 F | BODY MASS INDEX: 27.54 KG/M2 | HEART RATE: 68 BPM | HEIGHT: 68 IN | WEIGHT: 181.69 LBS | OXYGEN SATURATION: 97 % | SYSTOLIC BLOOD PRESSURE: 167 MMHG | RESPIRATION RATE: 14 BRPM

## 2025-06-11 DIAGNOSIS — D50.0 IRON DEFICIENCY ANEMIA DUE TO CHRONIC BLOOD LOSS: ICD-10-CM

## 2025-06-11 PROCEDURE — 99214 OFFICE O/P EST MOD 30 MIN: CPT | Mod: ,,, | Performed by: STUDENT IN AN ORGANIZED HEALTH CARE EDUCATION/TRAINING PROGRAM

## 2025-06-11 RX ORDER — EPINEPHRINE 0.3 MG/.3ML
0.3 INJECTION SUBCUTANEOUS ONCE AS NEEDED
OUTPATIENT
Start: 2025-06-11

## 2025-06-11 RX ORDER — HEPARIN 100 UNIT/ML
500 SYRINGE INTRAVENOUS
OUTPATIENT
Start: 2025-06-11

## 2025-06-11 RX ORDER — SODIUM CHLORIDE 0.9 % (FLUSH) 0.9 %
10 SYRINGE (ML) INJECTION
OUTPATIENT
Start: 2025-06-11

## 2025-06-11 NOTE — PROGRESS NOTES
PCP: Dr. Tha Hopkins (cardiologist)    Chief complaint:   Chief Complaint   Patient presents with    Follow-up     Patient reports no new concerns today.          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 Lost to follow up    Has previously received multiple courses of IV iron. Multiple PRBC transfusions.    In 6/2019 he had EGD with Dr Fajardo that showed hiatal hernia, schatzki's ring, esophagitis, gastric ulcer and gastritis.    Most recently presented to [facility] 8/2019 with CP and found to have severe anemia with Hg of 3.2, FOBT+. He was admitted and received a total of 6u of PRBC. He had EGD with Dr Hilario and found to have a few benign sessile polyps in antrum and fundus. He also had colonoscopy with Dr Fajardo that showed colon polyp and internal and external hemorrhoids. 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 During admission he was noted to have thrombocytopenia as well w/ PLT count dropping to ~50k prior to discharge. He was referred for outpatient capsule endoscopy which as per pt he had 9/2019, results not available.  Patient has a close follow-up with GI thereafter    Interval History:    Today, 06/11/2025, patient denies any acute concerns today.  He does report symptoms of fatigue as well as intermittent shortness of breath.  He denies any chest pain, chest tightness, palpitations he denies any new medications, ER or hospital visits since his last follow-up visit with us.    Labs:  06/03/2025 creatinine 1.16, iron 52, TIBC 324, saturation 16, ferritin 32, WBC count 4.5, hemoglobin 11.2, MCV 93.7, platelet count 201, ANC 1.7  04/09/2025: CMP unremarkable, iron 33, ironsat 26%, ferritin 106, folic acid 11.12, B12 553, wbc 3.77, hgb 11.7, plt 185, ANC  2.37  01/28/25: cr 1.37, iron 30, ironsat 9%, ferritin 13, wbc 4.46, hgb 10.9, plt 223, ANC 2.88  11/25/2024 creatinine 1.3, LFTs unremarkable, Iron 78, TIBC 360, % saturation 22, ferritin 37, folic acid 12.7, WBC count 4.3, hemoglobin 12.0, MCV 92.8, platelet count 200, ANC 2.6.    09/23/24: CMP WNL, iron 40, iron sat 13%, ferritin 58, folic acid 11.02, B12 540, wbc 4.04, hgb 11.5, plt 215  06/25/24 cr 1.33, iron 138, ironsat 37% ferritin 12, folic acid 12.25, B12 647, wbc 3.88, hgb 11.1, plt 206, ANC 2.19  03/26/24: CMP unremarkable, iron 71, aclvakw62%, ferritin 30, wbc 3.79, hgb 11.3, plt 196,   12/26/23: iron 52, iron sat 16, ferritin 61, wbc 3.50, hgb 12.4, plt 190, ANC 1.88  10/18/23: iron 74, ferritin 168, wbc 4.00, hgb 13.2, plt 170  08/09/2023: CMP unremarkable, iron59, iron sat 16%, ferritin 14, wbc 3.21, hgb 9.2, plt 233, ANC 1.84  06/2023: CMP unremarkable, iron 115, iron sat 31%, ferritin 17, wbc 3.59, hgb 10.1, plt 187, ANC 2.07   05/09/23: CMP unremarkable, iron 67, iron sat 21%, ferritin 21, wbc 4.82, hgb 11.1, plt 203, ANC 3.01  03/07/23: cr 1.16, ca 8.7, iron 56, ironsat 20%, ferritin 97, wbc 3.94, hgb 10.0, plt 228, ANC 2.59  01/05/2023:  Creatinine 1.26, albumin 4.1, calcium 9.2, alkaline phosphatase 56, total bilirubin 0.2, AST 12, ALT 13, , iron 89, TIBC 418, % saturation 21, ferritin 14, folic acid 10.7, vitamin B12 983, WBC count 3.47, hemoglobin 9.3, MCV 90.5, platelet count 237.  9/8/22 Cr 1.18, Alb 3.9, , iron 56, TIBC 333, Ferritin 34, folic acid 10.87, B12 1872, WBC 3.46, Hgb 11.7,  MCV 93, , retic 2.13  5/6/22 Cr 1.33 Alb 3.9  Iron 77 TIBC 345 Ferritin 42 WBC 3.41 RBC 3.91 Hg 11.9 MCV 94.1 RDW 13.5  Retic 2.65%  1/17/22 Cr 1.24, Alb 4.1, Iron 57, TIBC 324, iron sat 18%, Ferritin 56, folic acid 9.29, B12 1799, WBC 3.60, Hgb 13.0, MCV  93.8,   9/20/21 Cr 1.25 Alb 4.3  Iron 75 TIBC 345 Ferritin 48 WBC 4.09 Hg 13.3   7/20/21 Cr 1.55,  Alb 3.8, TP 6.8, iron 47, TIBC 334, Ferritin 30, WBC 3.71, Hgb 12.6, MCV 92.3, RDW 13.0, , retic 1.93%  6/7/21 Cr 1.16, Alb 4.1, TP 7.3, iron 52, TIBC 371, Ferritin 21, Folic acid 13.76, B12 >2000, WBC 3.74, Hgb 11.9, MCV 93.2, RDW 13.7, , retic 3.34  4/12/21 Cr 1.17 Alb 4.2 TP 7.6 Iron 55 TIBC 342. Ferritin 40 Folate 12.68 B12>2000 WBC 3.72 RBC 4.56 Hg 14 MCV 92.1 RDW 13.6  Retic 120k ANC 2.06  2/18/21 Glu 239, Cr 1.24, TP 7.1 Alb 4.0, Ca 9.1 Iron 86 TIBC 322 Iron sat 27% Ferritin 35 Folic acid 17.37, B12 1204, WBC 4.08, Hgb 13.1, , ANC  2.51, retic 2.43%  12/9/20 , CR 1.18, TP 6.5, ALB 3.7, calcium 8.6, iron 100, TIBC 295, iron sat 34%, ferritin 31, folic acid 15.54, B12 1187, WBC 3.64, Hg 12.3, , ANC 2.16, retic 1K  8/18/20 Cr 1.3 Alb 4 TP 6.9 AST 12 ALT 10 Iron 83 TIBC 295 Ferritin 50 Folate 14.91 B12 1671 WBC 4.46 Hg 12.6 RBC 4.13 MCV 95.6 RDW 14.1  Retic % 2.53 Abs retic 100k  3/25/20 Cr 1.13 iron 86 TIBC 278 Ferritin 119 Folate 16 B12 >2000 WBC 2.80 RBC 3.91 Hgb 12.2 MCV 97  retic 2.45% ANC 1.70  1/31/20 Cr 1.23 Alb 4.2 Iron 57 TIBC 285 Ferritin 158 WBC 3.31 Hg 13 RBC 4.16 MCV 97.1  Retic ~85k ANC 1.75  12/6/19 Iron 89, TIBC 288, iron sat 31% Ferritin 378, Folic acid 18.04, b12 1535, Hgb 12.4, , WBC 3.54, ANC 1.96  10/22/19 WBC 3.5 Hg 11.1 MCV 92 RDW 17.6  Ferritin 12 Iron 61 TIBC 378  9/25/19 Iron 55 TIBC 393 Ferritin 32 Folate 18.8 B12 1453  8/13/19 Hg 9.5 MCV 80.4 WBC 4.75 PLT 52k RBC 3.78  8/9/19 Hg 3.2 MCV 65 RBC 1.89 RDW 22.4 PLT 119k    Lab History:  Iron 114, ferritin 10-- Hgb 10.5-- 1/21/2014  Iron 48, ferritin 9, iron sat 12%, Hgb 9.6-- 3/25/2014  Iron 31, ferritin 6, iron sat 8%, Hgb 8.7--5/22/2014  Iron 60, ferritin 49, iron sat 20%, Hgb 12.7- TIBC  305- 7/23/2014  Iron 12, ferritin 5, iron sat 3%, Hgb 6.4 Hct 22.5 TIBC 388--12/22/2014.  Iron 11, TIBC 446, iron sat 2.5%, ferritin 4.8-- 5/28/2015  Iron 75, TIBC 317, iron  sat 24%, ferritin 538-- 6/23/2015  Iron 71, TIBC 355, iron sat 20%, ferritin 17--9/03/2015  Iron 107, TIBC 285, iron sat 38%, ferritin 255-- 10/15/2015  Iron 11, TIBC 305, iron sat 4%, ferritin 6 -- 02/12/2016  Iron 126, TIBC 335, iron sat 38%, ferritin 18- 3/14/16  Retic 4%, Ferritin 18- 4/19/16  Ferritin 14, retic 3%- 5/10/16  Iron  141, TIBC 333 , Iron sat  42%, Ferritin 18,  Retic 2.66  -6/6/16  Iron 209, TIBC 360, iron sat 58%, ferritin 21 - 8/30/16  10/2016 Ferritin 14, iron 24, iron sat 7% -->Injectafer ordered 12/7/16 11/29/16 HGB 7.0--:> transfused  1/9/17  HGB 11.8, MCV 96, Ferritin 257  2/20/17 Ferritin 18, HGB 11.8  4/12/17 WBC 3.1, HGB 7.1, MCV 80, PLT 282K, Ferritin 6--> Injectafer 4/12/17 and 4/16/17 4/29/17 Ferritin 266  6/5/17 WBC 2.4, Hgb 7.4, MCV 87, , ferritin 10.--> Injectafer for 6/5/17 and 2 units packed red blood cells  7/5/17 WBC 8.1, Hgb 10.9, PLT 2:30 1K, MCV 96, ferritin 247  9/11/2017 WBC 2.8, Hgb 11.5, , ferritin 421  1/24/17 WBC 3.71, HGB 11.3, PLT 319K Ferritin 319  3/28/18 WBC 2.5, HGB 11.3, MCV 93, PLT 151K Ferritin 294  10/18/2023:  WBC 4.00, Hgb 13.2, Hct 41.0, Plt 170,000, Na+ 135, K+ 5.1, glucose 224 (non-fasting), creatinine 1.13, TIBC 293, Ferritin 168.     Procedures:  Capsule endoscopy report dated 7/12/2023:  Multiple small nonbleeding AVMs in the distal duodenum and proximal jejunum and 1 actively oozing AVM in the proximal.   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 Normal duodenum. Repeat EGD 8/10/19 normal stomach and esophagus with few benign gastric polyps  Colonoscopy 8/13/19 with polyps, internal and external hemorrhoids       Imaging: None    Path:  8/9/19  sigmoid colon biopsy: polypoid colonic mucosa with benign lymphoid aggregate and reactive mucosal changes. no hyperplastic polyp, adenomatous change or malignancy    Past Medical History:   Diagnosis Date    Anemia     Diabetes mellitus     Hyperlipidemia     Hypertension     Thrombocytopenia        Past Surgical History:   Procedure Laterality Date    ANGIOGRAM, CORONARY, WITH LEFT HEART CATHETERIZATION      APPENDECTOMY      Cataract Surgery      CORONARY ANGIOPLASTY WITH STENT PLACEMENT         Family History   Problem Relation Name Age of Onset    Coronary artery disease Mother      Diabetes Mother      Hypertension Mother         Social History     Socioeconomic History    Marital status:    Tobacco Use    Smoking status: Never    Smokeless tobacco: Never   Substance and Sexual Activity    Alcohol use: Not Currently    Drug use: Never    Sexual activity: Not Currently     Social Drivers of Health     Food Insecurity: No Food Insecurity (11/4/2022)    Received from [facility] and Its Subsidiaries and Affiliates    Hunger Vital Sign     Worried About Running Out of Food in the Last Year: Never true     Ran Out of Food in the Last Year: Never true   Housing Stability: Unknown (11/4/2022)    Received from [facility] and Its Subsidiaries and Affiliates    Housing Stability Vital Sign     Unable to Pay for Housing in the Last Year: No       Current Outpatient Medications   Medication Sig Dispense Refill    albuterol (PROVENTIL/VENTOLIN HFA) 90 mcg/actuation inhaler Inhale 1 puff into the lungs 2 (two) times daily.      amLODIPine (NORVASC) 5 MG tablet TAKE 1 TABLET BY MOUTH DAILY AT 11 AM      ascorbic acid, vitamin C, (VITAMIN C) 500 MG tablet Take 1 tablet (500 mg) by mouth twice daily with oral iron. 60 tablet 6    carvediloL (COREG) 6.25 MG tablet TAKE 1 TABLET BY MOUTH TWICE DAILY FOR BLOOD PRESSURE AND HEART      COMBIGAN  0.2-0.5 % Drop INSTILL 1 DROP BOTH EYES TWICE DAILY      ferrous gluconate (FERGON) 324 MG tablet Take 1 tablet (324 mg total) by mouth 2 (two) times a day. Take 2 hours after eating 60 tablet 6    fluticasone-umeclidin-vilanter (TRELEGY ELLIPTA) 200-62.5-25 mcg inhaler Inhale into the lungs.      glipiZIDE (GLUCOTROL) 10 MG tablet Take 10 mg by mouth 2 (two) times daily.      isosorbide mononitrate (IMDUR) 60 MG 24 hr tablet Take 120 mg by mouth nightly.      lisinopriL (PRINIVIL,ZESTRIL) 20 MG tablet Take 20 mg by mouth.      magnesium oxide (MAG-OX) 400 mg (241.3 mg magnesium) tablet Take 1 tablet by mouth 2 (two) times daily.      metFORMIN (GLUCOPHAGE) 500 MG tablet Take 500 mg by mouth.      ranolazine (RANEXA) 1,000 mg Tb12 Take 500 mg by mouth 2 (two) times daily.      ranolazine (RANEXA) 1,000 mg Tb12 Take 1,000 mg by mouth 2 (two) times daily.      rosuvastatin (CRESTOR) 20 MG tablet Take 20 mg by mouth every evening.       No current facility-administered medications for this visit.       Review of patient's allergies indicates:  No Known Allergies          Review of Systems  CONSTITUTIONAL: no fevers, no chills, no weight loss, +mild chronic fatigue, no weakness  HEMATOLOGIC: no abnormal bleeding, no abnormal bruising, no drenching  night sweats  ONCOLOGIC: no new masses or lumps  HEENT: no vision loss, no tinnitus or hearing loss, no nose bleeding, no dysphagia, no odynophagia  CVS: no chest pain, no palpitations, +mild chronic dyspnea on exertion  RESP: + shortness of breath, no hemoptysis, no cough  GI: no nausea, no vomiting, no diarrhea, no constipation, no melena, no hematochezia, no hematemesis, no abdominal pain, no increase in abdominal girth  : no dysuria, no hematuria, no hesitancy, no scrotal swelling, no discharge  INTEGUMENT: no rashes, no abnormal bruising, no nail pitting, no hyperpigmentation  NEURO: no falls, no memory loss, no paresthesias or dysesthesias, no urofecal incontinence or  retention, no loss of strength on any extremity  MSK: no back pain, no new joint pain, no joint swelling  PSYCH: no suicidal or homicidal ideation, no depression, no insomnia, no anhedonia  ENDOCRINE: no heat or cold intolerance, no polyuria, no polydipsia    Physical Exam  Vitals:    06/11/25 1352   BP: (!) 167/78   Pulse: 68   Resp: 14   Temp: 98 °F (36.7 °C)         GA: AAOx3, NAD  HEENT:  moist oral mucous membranes  RESP:  Equal chest rise, no accessory muscle use  EXT: no deformities, no pedal edema  SKIN: no rashes, warm and dry  NEURO: normal mentation, no gross neuro deficits        Assessment/Plan  1. Anemia due to iron deficiency D50.0  Given chronic CAD Hg should be >8/dl  MJ is due to recurrent GI blood loss.  Received IV Injectafer, multiple times  Continue to f/u with cardiology as recommended, C/w inhaler PRN SOB        2. Thrombocytopenia D69.6  Multifactorial. During hospitalization at least partially dilutional due to transfusion of 6 u of PRBC as well as consumptive due to ongoing bleeding. Also likely due to medications including lasix, PPI and h2 blockers  PLT count has now normalized      Plan   Labs reviewed, noted downtrending ferritin, saturation as well as hemoglobin level   Plan to repeat IV iron given his history of GI bleed in setting of AVMs  Continue oral iron BID w/ vit C  (May stop during IV iron repletion).   Follow-up with Dr. Dr. Escalera for evaluation of AVM's and possible ablation  Follow-up in clinic in 12 weeks for office visit and repeat CBC, CMP, iron panel, ferritin    Portions of the record may have been created with voice recognition software. Occasional wrong-word or sound-a-like substitutions may have occurred due to the inherent limitations of voice recognition software.

## 2025-06-30 ENCOUNTER — PATIENT OUTREACH (OUTPATIENT)
Dept: ADMINISTRATIVE | Facility: CLINIC | Age: 85
End: 2025-06-30
Payer: MEDICARE

## 2025-07-06 ENCOUNTER — HOSPITAL ENCOUNTER (INPATIENT)
Facility: HOSPITAL | Age: 85
LOS: 2 days | Discharge: HOME | DRG: 603 | End: 2025-07-09
Attending: EMERGENCY MEDICINE | Admitting: INTERNAL MEDICINE
Payer: MEDICARE

## 2025-07-06 DIAGNOSIS — L03.213 PERIORBITAL CELLULITIS OF LEFT EYE: ICD-10-CM

## 2025-07-06 DIAGNOSIS — R07.9 CHEST PAIN: ICD-10-CM

## 2025-07-06 DIAGNOSIS — R06.02 SOB (SHORTNESS OF BREATH): ICD-10-CM

## 2025-07-06 DIAGNOSIS — I25.110 ATHEROSCLEROSIS OF NATIVE CORONARY ARTERY OF NATIVE HEART WITH UNSTABLE ANGINA PECTORIS: ICD-10-CM

## 2025-07-06 DIAGNOSIS — I21.4 NSTEMI (NON-ST ELEVATED MYOCARDIAL INFARCTION): ICD-10-CM

## 2025-07-06 DIAGNOSIS — B02.22 ACUTE TRIGEMINAL HERPES ZOSTER: Primary | ICD-10-CM

## 2025-07-06 DIAGNOSIS — L03.213 PRESEPTAL CELLULITIS OF LEFT EYE: ICD-10-CM

## 2025-07-06 LAB
BASOPHILS # BLD AUTO: 0.02 X10(3)/MCL
BASOPHILS NFR BLD AUTO: 0.6 %
EOSINOPHIL # BLD AUTO: 0.04 X10(3)/MCL (ref 0–0.9)
EOSINOPHIL NFR BLD AUTO: 1.2 %
ERYTHROCYTE [DISTWIDTH] IN BLOOD BY AUTOMATED COUNT: 15.3 % (ref 11.5–17)
HCT VFR BLD AUTO: 38.1 % (ref 42–52)
HGB BLD-MCNC: 12.2 G/DL (ref 14–18)
IMM GRANULOCYTES # BLD AUTO: 0.05 X10(3)/MCL (ref 0–0.04)
IMM GRANULOCYTES NFR BLD AUTO: 1.5 %
LYMPHOCYTES # BLD AUTO: 0.83 X10(3)/MCL (ref 0.6–4.6)
LYMPHOCYTES NFR BLD AUTO: 24.7 %
MCH RBC QN AUTO: 29.6 PG (ref 27–31)
MCHC RBC AUTO-ENTMCNC: 32 G/DL (ref 33–36)
MCV RBC AUTO: 92.5 FL (ref 80–94)
MONOCYTES # BLD AUTO: 0.57 X10(3)/MCL (ref 0.1–1.3)
MONOCYTES NFR BLD AUTO: 17 %
NEUTROPHILS # BLD AUTO: 1.85 X10(3)/MCL (ref 2.1–9.2)
NEUTROPHILS NFR BLD AUTO: 55 %
NRBC BLD AUTO-RTO: 0 %
PLATELET # BLD AUTO: 185 X10(3)/MCL (ref 130–400)
PMV BLD AUTO: 8.7 FL (ref 7.4–10.4)
RBC # BLD AUTO: 4.12 X10(6)/MCL (ref 4.7–6.1)
WBC # BLD AUTO: 3.36 X10(3)/MCL (ref 4.5–11.5)

## 2025-07-06 PROCEDURE — 85025 COMPLETE CBC W/AUTO DIFF WBC: CPT | Performed by: EMERGENCY MEDICINE

## 2025-07-06 PROCEDURE — 99285 EMERGENCY DEPT VISIT HI MDM: CPT | Mod: 25

## 2025-07-06 PROCEDURE — 83036 HEMOGLOBIN GLYCOSYLATED A1C: CPT | Performed by: EMERGENCY MEDICINE

## 2025-07-06 PROCEDURE — 84484 ASSAY OF TROPONIN QUANT: CPT | Performed by: EMERGENCY MEDICINE

## 2025-07-06 PROCEDURE — 80053 COMPREHEN METABOLIC PANEL: CPT | Performed by: EMERGENCY MEDICINE

## 2025-07-06 NOTE — Clinical Note
[facility] Int Med with Ophtho consult Detail Level: Detailed General Sunscreen Counseling: I recommended a broad spectrum sunscreen with a SPF of 30 or higher.  I explained that SPF 30 sunscreens block approximately 97 percent of the sun's harmful rays.  Sunscreens should be applied at least 15 minutes prior to expected sun exposure and then every 2 hours after that as long as sun exposure continues. If swimming or exercising sunscreen should be reapplied every 45 minutes to an hour after getting wet or sweating.  One ounce, or the equivalent of a shot glass full of sunscreen, is adequate to protect the skin not covered by a bathing suit. I also recommended a lip balm with a sunscreen as well. Sun protective clothing can be used in lieu of sunscreen but must be worn the entire time you are exposed to the sun's rays.

## 2025-07-07 PROBLEM — L03.213 PRESEPTAL CELLULITIS OF LEFT EYE: Status: ACTIVE | Noted: 2025-07-07

## 2025-07-07 LAB
ALBUMIN SERPL-MCNC: 3.8 G/DL (ref 3.4–4.8)
ALBUMIN/GLOB SERPL: 1 RATIO (ref 1.1–2)
ALP SERPL-CCNC: 106 UNIT/L (ref 40–150)
ALT SERPL-CCNC: 11 UNIT/L (ref 0–55)
ANION GAP SERPL CALC-SCNC: 9 MEQ/L
AST SERPL-CCNC: 14 UNIT/L (ref 11–45)
BILIRUB SERPL-MCNC: 0.3 MG/DL
BUN SERPL-MCNC: 15 MG/DL (ref 8.4–25.7)
CALCIUM SERPL-MCNC: 8.2 MG/DL (ref 8.8–10)
CHLORIDE SERPL-SCNC: 108 MMOL/L (ref 98–107)
CO2 SERPL-SCNC: 18 MMOL/L (ref 23–31)
CREAT SERPL-MCNC: 1.11 MG/DL (ref 0.72–1.25)
CREAT/UREA NIT SERPL: 14
EST. AVERAGE GLUCOSE BLD GHB EST-MCNC: 122.6 MG/DL
GFR SERPLBLD CREATININE-BSD FMLA CKD-EPI: >60 ML/MIN/1.73/M2
GLOBULIN SER-MCNC: 3.8 GM/DL (ref 2.4–3.5)
GLUCOSE SERPL-MCNC: 157 MG/DL (ref 82–115)
HBA1C MFR BLD: 5.9 %
HOLD SPECIMEN: NORMAL
MAGNESIUM SERPL-MCNC: 2 MG/DL (ref 1.6–2.6)
MRSA PCR SCRN (OHS): NOT DETECTED
OHS QRS DURATION: 132 MS
OHS QRS DURATION: 136 MS
OHS QRS DURATION: 156 MS
OHS QTC CALCULATION: 482 MS
OHS QTC CALCULATION: 482 MS
OHS QTC CALCULATION: 491 MS
PHOSPHATE SERPL-MCNC: 1.1 MG/DL (ref 2.3–4.7)
POCT GLUCOSE: 182 MG/DL (ref 70–110)
POCT GLUCOSE: 197 MG/DL (ref 70–110)
POCT GLUCOSE: 202 MG/DL (ref 70–110)
POCT GLUCOSE: 243 MG/DL (ref 70–110)
POTASSIUM SERPL-SCNC: 4.4 MMOL/L (ref 3.5–5.1)
PROT SERPL-MCNC: 7.6 GM/DL (ref 5.8–7.6)
SODIUM SERPL-SCNC: 135 MMOL/L (ref 136–145)
TROPONIN I SERPL-MCNC: 0.09 NG/ML (ref 0–0.04)
TROPONIN I SERPL-MCNC: 0.1 NG/ML (ref 0–0.04)
TROPONIN I SERPL-MCNC: 0.12 NG/ML (ref 0–0.04)
TROPONIN I SERPL-MCNC: 0.14 NG/ML (ref 0–0.04)
TROPONIN I SERPL-MCNC: 0.15 NG/ML (ref 0–0.04)
TSH SERPL-ACNC: 1.02 UIU/ML (ref 0.35–4.94)

## 2025-07-07 PROCEDURE — 93005 ELECTROCARDIOGRAM TRACING: CPT

## 2025-07-07 PROCEDURE — 63600175 PHARM REV CODE 636 W HCPCS

## 2025-07-07 PROCEDURE — 25000003 PHARM REV CODE 250

## 2025-07-07 PROCEDURE — 36415 COLL VENOUS BLD VENIPUNCTURE: CPT | Performed by: INTERNAL MEDICINE

## 2025-07-07 PROCEDURE — 63600175 PHARM REV CODE 636 W HCPCS: Performed by: INTERNAL MEDICINE

## 2025-07-07 PROCEDURE — 94640 AIRWAY INHALATION TREATMENT: CPT

## 2025-07-07 PROCEDURE — 25000003 PHARM REV CODE 250: Performed by: INTERNAL MEDICINE

## 2025-07-07 PROCEDURE — 36415 COLL VENOUS BLD VENIPUNCTURE: CPT

## 2025-07-07 PROCEDURE — 84100 ASSAY OF PHOSPHORUS: CPT

## 2025-07-07 PROCEDURE — 21400001 HC TELEMETRY ROOM

## 2025-07-07 PROCEDURE — 84484 ASSAY OF TROPONIN QUANT: CPT

## 2025-07-07 PROCEDURE — 94760 N-INVAS EAR/PLS OXIMETRY 1: CPT

## 2025-07-07 PROCEDURE — 25000242 PHARM REV CODE 250 ALT 637 W/ HCPCS

## 2025-07-07 PROCEDURE — 83735 ASSAY OF MAGNESIUM: CPT

## 2025-07-07 PROCEDURE — 84443 ASSAY THYROID STIM HORMONE: CPT | Performed by: INTERNAL MEDICINE

## 2025-07-07 PROCEDURE — 87641 MR-STAPH DNA AMP PROBE: CPT | Performed by: INTERNAL MEDICINE

## 2025-07-07 RX ORDER — GLUCAGON 1 MG
1 KIT INJECTION
Status: DISCONTINUED | OUTPATIENT
Start: 2025-07-07 | End: 2025-07-09 | Stop reason: HOSPADM

## 2025-07-07 RX ORDER — RANOLAZINE 500 MG/1
1000 TABLET, EXTENDED RELEASE ORAL 2 TIMES DAILY
Status: DISCONTINUED | OUTPATIENT
Start: 2025-07-07 | End: 2025-07-09 | Stop reason: HOSPADM

## 2025-07-07 RX ORDER — ISOSORBIDE MONONITRATE 30 MG/1
120 TABLET, EXTENDED RELEASE ORAL NIGHTLY
Status: DISCONTINUED | OUTPATIENT
Start: 2025-07-07 | End: 2025-07-09 | Stop reason: HOSPADM

## 2025-07-07 RX ORDER — CARVEDILOL 3.12 MG/1
6.25 TABLET ORAL 2 TIMES DAILY
Status: DISCONTINUED | OUTPATIENT
Start: 2025-07-07 | End: 2025-07-09 | Stop reason: HOSPADM

## 2025-07-07 RX ORDER — VALACYCLOVIR HYDROCHLORIDE 1 G/1
1000 TABLET, FILM COATED ORAL 3 TIMES DAILY
Status: DISCONTINUED | OUTPATIENT
Start: 2025-07-07 | End: 2025-07-08

## 2025-07-07 RX ORDER — BRIMONIDINE TARTRATE AND TIMOLOL MALEATE 2; 5 MG/ML; MG/ML
1 SOLUTION OPHTHALMIC
Status: DISCONTINUED | OUTPATIENT
Start: 2025-07-07 | End: 2025-07-07

## 2025-07-07 RX ORDER — IBUPROFEN 200 MG
16 TABLET ORAL
Status: DISCONTINUED | OUTPATIENT
Start: 2025-07-07 | End: 2025-07-09 | Stop reason: HOSPADM

## 2025-07-07 RX ORDER — SODIUM,POTASSIUM PHOSPHATES 280-250MG
2 POWDER IN PACKET (EA) ORAL ONCE
Status: COMPLETED | OUTPATIENT
Start: 2025-07-07 | End: 2025-07-07

## 2025-07-07 RX ORDER — SODIUM CHLORIDE 0.9 % (FLUSH) 0.9 %
10 SYRINGE (ML) INJECTION EVERY 12 HOURS PRN
Status: DISCONTINUED | OUTPATIENT
Start: 2025-07-07 | End: 2025-07-09 | Stop reason: HOSPADM

## 2025-07-07 RX ORDER — MUPIROCIN 20 MG/G
OINTMENT TOPICAL 2 TIMES DAILY
Status: DISCONTINUED | OUTPATIENT
Start: 2025-07-07 | End: 2025-07-09 | Stop reason: HOSPADM

## 2025-07-07 RX ORDER — ACETAMINOPHEN 325 MG/1
650 TABLET ORAL EVERY 4 HOURS PRN
Status: DISCONTINUED | OUTPATIENT
Start: 2025-07-07 | End: 2025-07-08

## 2025-07-07 RX ORDER — IBUPROFEN 200 MG
24 TABLET ORAL
Status: DISCONTINUED | OUTPATIENT
Start: 2025-07-07 | End: 2025-07-09 | Stop reason: HOSPADM

## 2025-07-07 RX ORDER — ALBUTEROL SULFATE 90 UG/1
1 INHALANT RESPIRATORY (INHALATION) 2 TIMES DAILY
Status: DISCONTINUED | OUTPATIENT
Start: 2025-07-07 | End: 2025-07-09 | Stop reason: HOSPADM

## 2025-07-07 RX ORDER — ATORVASTATIN CALCIUM 20 MG/1
20 TABLET, FILM COATED ORAL DAILY
Status: DISCONTINUED | OUTPATIENT
Start: 2025-07-07 | End: 2025-07-09 | Stop reason: HOSPADM

## 2025-07-07 RX ORDER — SPIRONOLACTONE 25 MG/1
25 TABLET ORAL DAILY
Status: DISCONTINUED | OUTPATIENT
Start: 2025-07-07 | End: 2025-07-09 | Stop reason: HOSPADM

## 2025-07-07 RX ORDER — TALC
6 POWDER (GRAM) TOPICAL NIGHTLY PRN
Status: DISCONTINUED | OUTPATIENT
Start: 2025-07-07 | End: 2025-07-09 | Stop reason: HOSPADM

## 2025-07-07 RX ORDER — SPIRONOLACTONE 25 MG/1
25 TABLET ORAL DAILY
COMMUNITY

## 2025-07-07 RX ORDER — LISINOPRIL 10 MG/1
20 TABLET ORAL DAILY
Status: DISCONTINUED | OUTPATIENT
Start: 2025-07-07 | End: 2025-07-09 | Stop reason: HOSPADM

## 2025-07-07 RX ORDER — FERROUS GLUCONATE 324(38)MG
324 TABLET ORAL 2 TIMES DAILY
Status: DISCONTINUED | OUTPATIENT
Start: 2025-07-07 | End: 2025-07-07

## 2025-07-07 RX ORDER — ENOXAPARIN SODIUM 100 MG/ML
40 INJECTION SUBCUTANEOUS EVERY 24 HOURS
Status: DISCONTINUED | OUTPATIENT
Start: 2025-07-07 | End: 2025-07-08

## 2025-07-07 RX ORDER — SODIUM CHLORIDE 9 MG/ML
INJECTION, SOLUTION INTRAVENOUS
Status: DISCONTINUED | OUTPATIENT
Start: 2025-07-07 | End: 2025-07-09 | Stop reason: HOSPADM

## 2025-07-07 RX ORDER — NALOXONE HCL 0.4 MG/ML
0.02 VIAL (ML) INJECTION
Status: DISCONTINUED | OUTPATIENT
Start: 2025-07-07 | End: 2025-07-09 | Stop reason: HOSPADM

## 2025-07-07 RX ORDER — BRIMONIDINE TARTRATE 2 MG/ML
1 SOLUTION/ DROPS OPHTHALMIC
Status: DISCONTINUED | OUTPATIENT
Start: 2025-07-07 | End: 2025-07-09 | Stop reason: HOSPADM

## 2025-07-07 RX ORDER — VALACYCLOVIR HYDROCHLORIDE 500 MG/1
500 TABLET, FILM COATED ORAL EVERY 12 HOURS
Status: DISCONTINUED | OUTPATIENT
Start: 2025-07-07 | End: 2025-07-07

## 2025-07-07 RX ORDER — INSULIN ASPART 100 [IU]/ML
0-5 INJECTION, SOLUTION INTRAVENOUS; SUBCUTANEOUS
Status: DISCONTINUED | OUTPATIENT
Start: 2025-07-07 | End: 2025-07-09 | Stop reason: HOSPADM

## 2025-07-07 RX ORDER — LEVOCETIRIZINE DIHYDROCHLORIDE 5 MG/1
5 TABLET, FILM COATED ORAL NIGHTLY
COMMUNITY

## 2025-07-07 RX ORDER — TIMOLOL MALEATE 5 MG/ML
1 SOLUTION/ DROPS OPHTHALMIC 2 TIMES DAILY
Status: DISCONTINUED | OUTPATIENT
Start: 2025-07-07 | End: 2025-07-09 | Stop reason: HOSPADM

## 2025-07-07 RX ORDER — LANOLIN ALCOHOL/MO/W.PET/CERES
400 CREAM (GRAM) TOPICAL 2 TIMES DAILY
Status: DISCONTINUED | OUTPATIENT
Start: 2025-07-07 | End: 2025-07-07

## 2025-07-07 RX ORDER — ERYTHROMYCIN 5 MG/G
OINTMENT OPHTHALMIC
Status: COMPLETED | OUTPATIENT
Start: 2025-07-07 | End: 2025-07-07

## 2025-07-07 RX ORDER — LANOLIN ALCOHOL/MO/W.PET/CERES
1 CREAM (GRAM) TOPICAL EVERY OTHER DAY
Status: DISCONTINUED | OUTPATIENT
Start: 2025-07-07 | End: 2025-07-09 | Stop reason: HOSPADM

## 2025-07-07 RX ORDER — AMLODIPINE BESYLATE 5 MG/1
5 TABLET ORAL DAILY
Status: DISCONTINUED | OUTPATIENT
Start: 2025-07-07 | End: 2025-07-09 | Stop reason: HOSPADM

## 2025-07-07 RX ORDER — ASCORBIC ACID 500 MG
500 TABLET ORAL DAILY
Status: DISCONTINUED | OUTPATIENT
Start: 2025-07-07 | End: 2025-07-09 | Stop reason: HOSPADM

## 2025-07-07 RX ORDER — METFORMIN HYDROCHLORIDE 500 MG/1
500 TABLET ORAL
Status: DISCONTINUED | OUTPATIENT
Start: 2025-07-07 | End: 2025-07-07

## 2025-07-07 RX ORDER — GLIPIZIDE 5 MG/1
10 TABLET ORAL 2 TIMES DAILY
Status: DISCONTINUED | OUTPATIENT
Start: 2025-07-07 | End: 2025-07-07

## 2025-07-07 RX ADMIN — ERYTHROMYCIN: 5 OINTMENT OPHTHALMIC at 11:07

## 2025-07-07 RX ADMIN — ALBUTEROL SULFATE 1 PUFF: 90 AEROSOL, METERED RESPIRATORY (INHALATION) at 07:07

## 2025-07-07 RX ADMIN — MUPIROCIN: 20 OINTMENT TOPICAL at 09:07

## 2025-07-07 RX ADMIN — OXYCODONE HYDROCHLORIDE AND ACETAMINOPHEN 500 MG: 500 TABLET ORAL at 09:07

## 2025-07-07 RX ADMIN — POTASSIUM & SODIUM PHOSPHATES POWDER PACK 280-160-250 MG 2 PACKET: 280-160-250 PACK at 04:07

## 2025-07-07 RX ADMIN — AMLODIPINE BESYLATE 5 MG: 5 TABLET ORAL at 09:07

## 2025-07-07 RX ADMIN — BRIMONIDINE TARTRATE 1 DROP: 2 SOLUTION OPHTHALMIC at 02:07

## 2025-07-07 RX ADMIN — ERYTHROMYCIN: 5 OINTMENT OPHTHALMIC at 03:07

## 2025-07-07 RX ADMIN — TIMOLOL MALEATE 1 DROP: 5 SOLUTION OPHTHALMIC at 04:07

## 2025-07-07 RX ADMIN — BRIMONIDINE TARTRATE 1 DROP: 2 SOLUTION OPHTHALMIC at 04:07

## 2025-07-07 RX ADMIN — RANOLAZINE 1000 MG: 500 TABLET, EXTENDED RELEASE ORAL at 08:07

## 2025-07-07 RX ADMIN — ERYTHROMYCIN: 5 OINTMENT OPHTHALMIC at 09:07

## 2025-07-07 RX ADMIN — SODIUM CHLORIDE: 9 INJECTION, SOLUTION INTRAVENOUS at 11:07

## 2025-07-07 RX ADMIN — VALACYCLOVIR 1000 MG: 1 TABLET, FILM COATED ORAL at 04:07

## 2025-07-07 RX ADMIN — ISOSORBIDE MONONITRATE 120 MG: 30 TABLET, EXTENDED RELEASE ORAL at 01:07

## 2025-07-07 RX ADMIN — ATORVASTATIN CALCIUM 20 MG: 20 TABLET, FILM COATED ORAL at 09:07

## 2025-07-07 RX ADMIN — RANOLAZINE 1000 MG: 500 TABLET, EXTENDED RELEASE ORAL at 09:07

## 2025-07-07 RX ADMIN — VANCOMYCIN HYDROCHLORIDE 1500 MG: 1.5 INJECTION, POWDER, LYOPHILIZED, FOR SOLUTION INTRAVENOUS at 04:07

## 2025-07-07 RX ADMIN — CARVEDILOL 6.25 MG: 3.12 TABLET, FILM COATED ORAL at 09:07

## 2025-07-07 RX ADMIN — MUPIROCIN: 20 OINTMENT TOPICAL at 08:07

## 2025-07-07 RX ADMIN — ERYTHROMYCIN: 5 OINTMENT OPHTHALMIC at 07:07

## 2025-07-07 RX ADMIN — ACETAMINOPHEN 650 MG: 325 TABLET ORAL at 08:07

## 2025-07-07 RX ADMIN — ENOXAPARIN SODIUM 40 MG: 40 INJECTION SUBCUTANEOUS at 04:07

## 2025-07-07 RX ADMIN — FERROUS SULFATE TAB 325 MG (65 MG ELEMENTAL FE) 1 EACH: 325 (65 FE) TAB at 09:07

## 2025-07-07 RX ADMIN — LISINOPRIL 20 MG: 10 TABLET ORAL at 09:07

## 2025-07-07 RX ADMIN — ACETAMINOPHEN 650 MG: 325 TABLET ORAL at 09:07

## 2025-07-07 RX ADMIN — ISOSORBIDE MONONITRATE 120 MG: 30 TABLET, EXTENDED RELEASE ORAL at 08:07

## 2025-07-07 RX ADMIN — ERYTHROMYCIN: 5 OINTMENT OPHTHALMIC at 04:07

## 2025-07-07 RX ADMIN — PIPERACILLIN SODIUM AND TAZOBACTAM SODIUM 4.5 G: 4; .5 INJECTION, POWDER, LYOPHILIZED, FOR SOLUTION INTRAVENOUS at 11:07

## 2025-07-07 RX ADMIN — VALACYCLOVIR 1000 MG: 1 TABLET, FILM COATED ORAL at 09:07

## 2025-07-07 RX ADMIN — CARVEDILOL 6.25 MG: 3.12 TABLET, FILM COATED ORAL at 08:07

## 2025-07-07 RX ADMIN — INSULIN ASPART 1 UNITS: 100 INJECTION, SOLUTION INTRAVENOUS; SUBCUTANEOUS at 09:07

## 2025-07-07 RX ADMIN — TIMOLOL MALEATE 1 DROP: 5 SOLUTION OPHTHALMIC at 02:07

## 2025-07-07 RX ADMIN — Medication 6 MG: at 08:07

## 2025-07-07 RX ADMIN — INSULIN ASPART 2 UNITS: 100 INJECTION, SOLUTION INTRAVENOUS; SUBCUTANEOUS at 05:07

## 2025-07-07 RX ADMIN — SPIRONOLACTONE 25 MG: 25 TABLET, FILM COATED ORAL at 09:07

## 2025-07-07 RX ADMIN — VALACYCLOVIR 1000 MG: 1 TABLET, FILM COATED ORAL at 08:07

## 2025-07-07 RX ADMIN — PIPERACILLIN SODIUM AND TAZOBACTAM SODIUM 4.5 G: 4; .5 INJECTION, POWDER, LYOPHILIZED, FOR SOLUTION INTRAVENOUS at 06:07

## 2025-07-07 NOTE — PLAN OF CARE
07/07/25 1136   Discharge Assessment   Assessment Type Discharge Planning Assessment   Confirmed/corrected address, phone number and insurance Yes   Confirmed Demographics Correct on Facesheet   Source of Information patient   People in Home spouse;child(trice), adult   Name(s) of People in Home Jerel Morgan (Spouse)  [phone]; 2 adult sons   Facility Arrived From: Home   Do you expect to return to your current living situation? Yes   Do you have help at home or someone to help you manage your care at home? Yes   Who are your caregiver(s) and their phone number(s)? Jerel Morgan (Other)  [phone]; Sons   Prior to hospitilization cognitive status: Alert/Oriented   Current cognitive status: Alert/Oriented   Walking or Climbing Stairs Difficulty no   Dressing/Bathing Difficulty no   Equipment Currently Used at Home none   Readmission within 30 days? No   Patient currently being followed by outpatient case management? No   Do you currently have service(s) that help you manage your care at home? No   Do you take prescription medications? Yes   Do you have prescription coverage? Yes   Do you have any problems affording any of your prescribed medications? No   Is the patient taking medications as prescribed? yes   Who is going to help you get home at discharge? Family   How do you get to doctors appointments? family or friend will provide   Are you on dialysis? No   Discharge Plan A Home with family   DME Needed Upon Discharge  none   Discharge Plan discussed with: Patient   Transition of Care Barriers None     Pt  with 4 children; Resides with spouse, Jerel, & 2 adult sons; Independent with ADL's; Receives SS benefits; CM to follow.

## 2025-07-07 NOTE — PROGRESS NOTES
"Consultation Report  Ophthalmology Service    Date: 07/07/2025    Reason for Consult: "left eye swollen shut"     History of Present Illness: Alex Morgan is a 84 y.o. male with history of CAD, diabetes, bifasciular heart block, HTN, asthma (reports albuterol usage), chickenpox as a child who presented to [facility] ED with left sided rash, blurred vision in left eye, and swollen left eyelid. Denies pain with eye movements, flashes of light, floaters.     POcularHx: Cataract surgery OU, reports elevated pressure in the past and believes he was placed on a pressure drop (red top, used twice daily). Denies glasses wearing for far, denies left eye weaker than right    Current eye gtts: Believes he is on a red top drop in both eyes for pressure    Family Hx: family history includes Coronary artery disease in his mother; Diabetes in his mother; Hypertension in his mother.     PMHx:  has a past medical history of Anemia, Diabetes mellitus, Hyperlipidemia, Hypertension, and Thrombocytopenia.     PSurgHx:  has a past surgical history that includes ANGIOGRAM, CORONARY, WITH LEFT HEART CATHETERIZATION; Appendectomy; Cataract Surgery; and Coronary angioplasty with stent.     Home Medications:   Prior to Admission medications    Medication Sig Start Date End Date Taking? Authorizing Provider   albuterol (PROVENTIL/VENTOLIN HFA) 90 mcg/actuation inhaler Inhale 1 puff into the lungs 2 (two) times daily. 3/24/22   Provider, Historical   amLODIPine (NORVASC) 5 MG tablet TAKE 1 TABLET BY MOUTH DAILY AT 11 AM 4/12/22   Provider, Historical   ascorbic acid, vitamin C, (VITAMIN C) 500 MG tablet Take 1 tablet (500 mg) by mouth twice daily with oral iron. 10/3/24   Bekah Waite, SHELLIE   carvediloL (COREG) 6.25 MG tablet TAKE 1 TABLET BY MOUTH TWICE DAILY FOR BLOOD PRESSURE AND HEART 2/11/22   Provider, Historical   COMBIGAN 0.2-0.5 % Drop INSTILL 1 DROP BOTH EYES TWICE DAILY 2/15/22   Provider, Historical   ferrous gluconate (FERGON) " 324 MG tablet Take 1 tablet (324 mg total) by mouth 2 (two) times a day. Take 2 hours after eating 3/27/25   Bekah Waite, NP   fluticasone-umeclidin-vilanter (TRELEGY ELLIPTA) 200-62.5-25 mcg inhaler Inhale into the lungs.    Provider, Historical   glipiZIDE (GLUCOTROL) 10 MG tablet Take 10 mg by mouth 2 (two) times daily. 4/3/22   Provider, Historical   isosorbide mononitrate (IMDUR) 60 MG 24 hr tablet Take 120 mg by mouth nightly. 4/12/22   Provider, Historical   lisinopriL (PRINIVIL,ZESTRIL) 20 MG tablet Take 20 mg by mouth. 1/24/22   Provider, Historical   magnesium oxide (MAG-OX) 400 mg (241.3 mg magnesium) tablet Take 1 tablet by mouth 2 (two) times daily. 5/3/22   Provider, Historical   metFORMIN (GLUCOPHAGE) 500 MG tablet Take 500 mg by mouth.    Provider, Historical   ranolazine (RANEXA) 1,000 mg Tb12 Take 500 mg by mouth 2 (two) times daily.    Provider, Historical   ranolazine (RANEXA) 1,000 mg Tb12 Take 1,000 mg by mouth 2 (two) times daily.    Provider, Historical   rosuvastatin (CRESTOR) 20 MG tablet Take 20 mg by mouth every evening. 5/3/22   Provider, Historical        Medications this encounter:    albuterol  1 puff Inhalation BID    amLODIPine  5 mg Oral Daily    ascorbic acid (vitamin C)  500 mg Oral Daily    atorvastatin  20 mg Oral Daily    brimonidine 0.2%  1 drop Both Eyes Q12H    carvediloL  6.25 mg Oral BID    enoxparin  40 mg Subcutaneous Daily    erythromycin   Left Eye 6x Daily    ferrous sulfate  1 tablet Oral Every other day    isosorbide mononitrate  120 mg Oral Nightly    lisinopriL  20 mg Oral Daily    mupirocin   Nasal BID    piperacillin-tazobactam (Zosyn) IV (PEDS and ADULTS) (extended infusion is not appropriate)  4.5 g Intravenous Q8H    ranolazine  1,000 mg Oral BID    spironolactone  25 mg Oral Daily    timolol maleate 0.5%  1 drop Both Eyes BID    valACYclovir  1,000 mg Oral TID    [START ON 7/8/2025] vancomycin (VANCOCIN) IV (PEDS and ADULTS)  1,000 mg Intravenous Q24H        Allergies: has no known allergies.     Social:  reports that he has never smoked. He has never used smokeless tobacco. He reports that he does not currently use alcohol. He reports that he does not use drugs.     ROS: As per HPI    Ocular examination/Dilated fundus examination:  Base Eye Exam       Visual Acuity (Snellen - Linear)         Right Left    Dist sc 20/30 20/100    Dist ph sc  20/70 +              Tonometry (Tonopen, 3:00 PM)         Right Left    Pressure 19 21              Pupils         Pupils Dark Light Shape React APD    Right PERRL 4 2 Round Brisk None    Left PERRL 4 2 Round Brisk None              Extraocular Movement         Right Left     Full, Ortho Full, Ortho                  Slit Lamp and Fundus Exam       External Exam         Right Left    External Seborrheic Keratosis on right cheek Vesicles involving upper forehead              Slit Lamp Exam         Right Left    Lids/Lashes Normal Upper and lower eyelid edema with ulceration along the superior lid margin and superior eyelid margin from open vesicles    Conjunctiva/Sclera White and quiet Chemosis more prominent inferotemporally (3-5:00), present nasally as well.    Cornea few pee, Arcus few pee, Arcus    Anterior Chamber Deep and quiet Deep and quiet    Iris Round and reactive Round and reactive, 3 o'clock sectoral iris atrophy    Lens Posterior chamber intraocular lens Posterior chamber intraocular lens    Anterior Vitreous Normal Normal                                          Assessment/Plan:     1. Herpes zoster ophthalmicus left side  - with conjunctival involvement and eyelid involvement  - given age >60, diabetic patient transferred from [place], is now being admitted for treatment. Extensive medical history including heart block, CAD, MI, and likely asthma (reports using inhalers)  - good IOP, will defer aqueous suppression  - full EOM   - color plates full OU  - no corneal lesions seen with fluorescein stain (mild punctate  epithelial erosions, likely reflecting dry eye)  - CN II - VIII grossly intact OU, symmetrical arm raise and leg raise OU  - DFE unremarkable OU  - recommend CT Orbit to evaluate for possible orbital cellulitis, patient with normal pupils, no pain with extraocular motion, but decreased vision and chemosis. Likely secondary to shingles but received IV vancomycin and zosyn   - reports history of chickenpox as a kid, denies history of cold sores or genital/anal rashes  - 7/7/25 PM interval update - no change in exam, patient denies any changes subjectively since last exam, chemosis without corneal epithelial defects, trace PEEs no pseudo dendrites. CT scan with no posterior orbital abnormalities, only preseptal edema. Slight decrease in VA, likely 2/2 ointment at time of exam  - continue valcyclovir 1000mg TID at least 7 days  - continue erythromycin ophthalmic ointment 6x daily to eyelids and into eye  - Start cool compresses QID left eye    2. Glaucoma history?  - patient reports being on a pressure drop in the past (however recalls a red top drop twice daily)  - neg family history of glaucoma  - normal IOP  - asymmetric cup disc with slight pallor OU  - may need outpatient MRI brain w/wo contrast if true atrophy  - will follow up in clinic for OCTN    3. Impetigo left brow  - some crusting noted along upper eyelid, honey colored  - recommend erythromycin onitment   Ophthalmology to follow while admitted    4. Dry eye OU  - can lubricate as above with erythromycin, will transition to artificial tears afterwards    5. Diabetes without retinopathy OU  Lab Results   Component Value Date    HGBA1C 5.9 07/06/2025   - continue annual screening    Emile Godoy MD   [employer] Ophthalmology PGY3  07/07/2025  3:12 PM

## 2025-07-07 NOTE — CONSULTS
"Consultation Report  Ophthalmology Service    Date: 07/07/2025    Reason for Consult: "left eye swollen shut"     History of Present Illness: Alex Morgan is a 84 y.o. male with history of CAD, diabetes, bifasciular heart block, HTN, asthma (reports albuterol usage), chickenpox as a child who presented to [facility] with left sided rash, blurred vision in left eye, and swollen left eyelid. Denies pain with eye movements, flashes of light, floaters.     POcularHx: Cataract surgery OU, reports elevated pressure in the past and believes he was placed on a pressure drop (red top, used twice daily). Denies glasses wearing for far, denies left eye weaker than right    Current eye gtts: Believes he is on a red top drop in both eyes for pressure    Family Hx: family history includes Coronary artery disease in his mother; Diabetes in his mother; Hypertension in his mother.     PMHx:  has a past medical history of Anemia, Diabetes mellitus, Hyperlipidemia, Hypertension, and Thrombocytopenia.     PSurgHx:  has a past surgical history that includes ANGIOGRAM, CORONARY, WITH LEFT HEART CATHETERIZATION; Appendectomy; Cataract Surgery; and Coronary angioplasty with stent.     Home Medications:   Prior to Admission medications    Medication Sig Start Date End Date Taking? Authorizing Provider   albuterol (PROVENTIL/VENTOLIN HFA) 90 mcg/actuation inhaler Inhale 1 puff into the lungs 2 (two) times daily. 3/24/22   Provider, Historical   amLODIPine (NORVASC) 5 MG tablet TAKE 1 TABLET BY MOUTH DAILY AT 11 AM 4/12/22   Provider, Historical   ascorbic acid, vitamin C, (VITAMIN C) 500 MG tablet Take 1 tablet (500 mg) by mouth twice daily with oral iron. 10/3/24   Bekah Waite, SHELLIE   carvediloL (COREG) 6.25 MG tablet TAKE 1 TABLET BY MOUTH TWICE DAILY FOR BLOOD PRESSURE AND HEART 2/11/22   Provider, Historical   COMBIGAN 0.2-0.5 % Drop INSTILL 1 DROP BOTH EYES TWICE DAILY 2/15/22   Provider, Historical   ferrous gluconate (FERGON) " 324 MG tablet Take 1 tablet (324 mg total) by mouth 2 (two) times a day. Take 2 hours after eating 3/27/25   Bekah Waite, NP   fluticasone-umeclidin-vilanter (TRELEGY ELLIPTA) 200-62.5-25 mcg inhaler Inhale into the lungs.    Provider, Historical   glipiZIDE (GLUCOTROL) 10 MG tablet Take 10 mg by mouth 2 (two) times daily. 4/3/22   Provider, Historical   isosorbide mononitrate (IMDUR) 60 MG 24 hr tablet Take 120 mg by mouth nightly. 4/12/22   Provider, Historical   lisinopriL (PRINIVIL,ZESTRIL) 20 MG tablet Take 20 mg by mouth. 1/24/22   Provider, Historical   magnesium oxide (MAG-OX) 400 mg (241.3 mg magnesium) tablet Take 1 tablet by mouth 2 (two) times daily. 5/3/22   Provider, Historical   metFORMIN (GLUCOPHAGE) 500 MG tablet Take 500 mg by mouth.    Provider, Historical   ranolazine (RANEXA) 1,000 mg Tb12 Take 500 mg by mouth 2 (two) times daily.    Provider, Historical   ranolazine (RANEXA) 1,000 mg Tb12 Take 1,000 mg by mouth 2 (two) times daily.    Provider, Historical   rosuvastatin (CRESTOR) 20 MG tablet Take 20 mg by mouth every evening. 5/3/22   Provider, Historical        Medications this encounter:     Allergies: has no known allergies.     Social:  reports that he has never smoked. He has never used smokeless tobacco. He reports that he does not currently use alcohol. He reports that he does not use drugs.     ROS: As per HPI    Ocular examination/Dilated fundus examination:  Base Eye Exam       Visual Acuity (Snellen - Linear)         Right Left    Dist sc 20/20 20/50    Near sc 20/50 20/200              Tonometry (Tonopen, 12:19 AM)         Right Left    Pressure 18 19              Pupils         Pupils Dark Light Shape React APD    Right PERRL 4 3 Round Brisk None    Left PERRL 4 3 Round Brisk None              Visual Fields         Right Left     Full Full              Extraocular Movement         Right Left     Full, Ortho Full, Ortho              Dilation       Both eyes: 1% Mydriacyl,  2.5% Phenylephrine @ 12:18 AM                  Slit Lamp and Fundus Exam       External Exam         Right Left    External Seborrheic Keratosis on right cheek Vesicles involving upper forehead              Slit Lamp Exam         Right Left    Lids/Lashes Normal Upper and lower eyelid edema with ulceration along the superior lid margin and superior eyelid margin from open vesicles    Conjunctiva/Sclera White and quiet Chemosis more prominent inferotemporally (3-5:00), present nasally as well.    Cornea few pee, Arcus few pee, Arcus    Anterior Chamber Deep and quiet Deep and quiet    Iris Round and reactive Round and reactive, 3 o'clock sectoral iris atrophy    Lens Posterior chamber intraocular lens Posterior chamber intraocular lens    Anterior Vitreous Normal Normal              Fundus Exam         Right Left    Disc slight pallor, sharp slight pallor, sharp    C/D Ratio 0.2 0.45    Macula Normal Normal    Vessels Normal Normal    Periphery flat 360, no heme, tears, detachment, no necrosis flat 360, no heme, tears, detachment, no necrosis.                                                    Assessment/Plan:     1. Herpes zoster ophthalmicus left side  - with conjunctival involvement and eyelid involvement  - given age >60, diabetic patient transferred from [place], is now being admitted for treatment. Extensive medical history including heart block, CAD, MI, and likely asthma (reports using inhalers)  - good IOP, will defer aqueous suppression  - full EOM   - color plates full OU  - no corneal lesions seen with fluorescein stain (mild punctate epithelial erosions, likely reflecting dry eye)  - CN II - VIII grossly intact OU, symmetrical arm raise and leg raise OU  - DFE unremarkable OU  - recommend CT Orbit to evaluate for possible orbital cellulitis, patient with normal pupils, no pain with extraocular motion, but decreased vision and chemosis. Likely secondary to shingles but received IV vancomycin and zosyn   -  reports history of chickenpox as a kid, denies history of cold sores or genital/anal rashes  - recommend systemic antiviral, typically valcyclovir 1000mg TID at least 7 days  - recommend erythromycin ophthalmic ointment 6x daily to eyelids and into eye    2. Glaucoma history?  - patient reports being on a pressure drop in the past (however recalls a red top drop twice daily)  - neg family history of glaucoma  - normal IOP  - asymmetric cup disc with slight pallor OU  - may need outpatient MRI brain w/wo contrast if true atrophy  - will follow up in clinic for OCTN    3. Impetigo left brow  - some crusting noted along upper eyelid, honey colored  - recommend erythromycin onitment   Ophthalmology to follow while admitted    4. Dry eye OU  - can lubricate as above with erythromycin, will transition to artificial tears afterwards    5. Diabetes without retinopathy OU  Lab Results   Component Value Date    HGBA1C 5.9 07/06/2025   - continue annual screening    Cristobal Pace MD   [employer] Ophthalmology PGY4  07/07/2025  12:12 AM

## 2025-07-07 NOTE — ED PROVIDER NOTES
Encounter Date: 7/6/2025       History     Chief Complaint   Patient presents with    Eye Problem     Pt arrives via [facility] transfer from [facility] for shingles in the left eye      Underlying history of coronary disease, diabetes, GI bleed, myocardial infarction, hypertension, chronic dyspnea, bifascicular block, chronically elevated troponin, uncertain pulmonary condition.  He developed pain in the left forehead and left periorbital region about 3 days ago, subsequently progressing to rash and swelling to the point where he has not been able to open it left eye for the last 2 days.  Seen at another facility and diagnosed clinically with V1 zoster on the left side, concern for periorbital cellulitis and inability to fully examined the left eye due to degree of swelling of the upper and lower lids.  No fever, chills, or sweats.  Baseline dyspnea, pain is minimal.  No other acute complaints.  Data are reviewed, he was started on IV vancomycin, Zosyn, and acyclovir in transferred here for ophthalmology evaluation.  No definite visual complaints but he can not really open his left eye to see well.  Ophthalmology and internal medicine consulted on arrival        Review of patient's allergies indicates:  No Known Allergies  Past Medical History:   Diagnosis Date    Anemia     Diabetes mellitus     Hyperlipidemia     Hypertension     Thrombocytopenia      Past Surgical History:   Procedure Laterality Date    ANGIOGRAM, CORONARY, WITH LEFT HEART CATHETERIZATION      APPENDECTOMY      Cataract Surgery      CORONARY ANGIOPLASTY WITH STENT PLACEMENT       Family History   Problem Relation Name Age of Onset    Coronary artery disease Mother      Diabetes Mother      Hypertension Mother       Social History[1]  Review of Systems   Eyes:         See HPI   Respiratory:  Positive for shortness of breath.    Skin:  Positive for rash.       Physical Exam     Initial Vitals [07/06/25 2305]   BP Pulse Resp Temp SpO2   (!) 157/82 66 18 99.3 °F  (37.4 °C) 98 %      MAP       --         Physical Exam    Nursing note and vitals reviewed.  Constitutional: He appears well-developed and well-nourished.   HENT:   Head: Normocephalic and atraumatic. Mouth/Throat: Oropharynx is clear and moist.   Classic eruption consistent with zoster in the left V1 distribution left upper and lower lid swelling and erythema consistent with possible secondary periorbital cellulitis   Eyes: EOM are normal. Pupils are equal, round, and reactive to light.   Difficult to visualize left eye due to lid edema.  I can see moderate chemosis and a clear cornea with reactive pupil, grossly intact vision but unable to assess further.   Neck: Neck supple.   Normal range of motion.  Cardiovascular:  Normal rate and regular rhythm.           Pulmonary/Chest: No respiratory distress. He has rhonchi.   Abdominal: Abdomen is soft. Bowel sounds are normal.   Musculoskeletal:         General: No tenderness. Normal range of motion.      Cervical back: Normal range of motion and neck supple.     Neurological: He is alert and oriented to person, place, and time. He has normal strength.   Skin: Rash noted.   See discussion above.  Left V1 zoster with possible secondary periorbital cellulitis         ED Course   Procedures  Labs Reviewed   CBC W/ AUTO DIFFERENTIAL    Narrative:     The following orders were created for panel order CBC auto differential.  Procedure                               Abnormality         Status                     ---------                               -----------         ------                     CBC with Differential[[id number]]                                                        Please view results for these tests on the individual orders.   COMPREHENSIVE METABOLIC PANEL   TROPONIN I   HEMOGLOBIN A1C   CBC WITH DIFFERENTIAL          Imaging Results    None          Medications - No data to display  Medical Decision Making  Received in transfer for left V1 zoster with  concern for periorbital cellulitis and possible ophthalmic involvement of herpetic infection, consulting Internal Medicine and ophthalmology    Problems Addressed:  Acute trigeminal herpes zoster: acute illness or injury  Periorbital cellulitis of left eye: acute illness or injury    Amount and/or Complexity of Data Reviewed  Labs: ordered. Decision-making details documented in ED Course.  Radiology: ordered. Decision-making details documented in ED Course.  ECG/medicine tests: ordered and independent interpretation performed. Decision-making details documented in ED Course.    Risk  Decision regarding hospitalization.      Additional MDM:   Differential Diagnosis:   Shingles, periorbital cellulitis, ophthalmologic involvement with zoster among others                                    Clinical Impression:  Final diagnoses:  [R06.02] SOB (shortness of breath)  [B02.22] Acute trigeminal herpes zoster (Primary)  [L03.213] Periorbital cellulitis of left eye          ED Disposition Condition    Observation                       [1]   Social History  Tobacco Use    Smoking status: Never    Smokeless tobacco: Never   Substance Use Topics    Alcohol use: Not Currently    Drug use: Never        Jarocho Hedrick MD  07/06/25 6318

## 2025-07-07 NOTE — H&P
[facility] Internal Medicine  HISTORY & PHYSICAL NOTE    Patient's Name: Alex Morgan  : 1940  MRN: [id number]    Admission Date: 2025  Attending Physician: Sanjeev Garcia MD  Resident: Aysha Gregory  Intern: Erin Ralph MD  Primary Care Provider: Emelia, Primary Doctor    SUBJECTIVE     Chief Complaint   Patient presents with    Eye Problem     Pt arrives via [facility] transfer from [facility] [place] [facility] for shingles in the left eye      History of Present Illness:  Alex Morgan is a 84 y.o. male with a PMHx of CAD s/p PCI, BUDDY on CPAP, HTN, DM, HLD  who presents to [facility] for significant swelling in the left eye and a crusted lesion on the left forehead.Patient was seen at another medical center before coming to [facility]. The patient reports that the forehead lesion first appeared on Thursday and experienced minimal pain. The swelling around the left eye began on Friday morning and has progressively worsened, leading to puffiness and difficulty opening the left eye fully. Patient has watery discharge out of the left eye. Patient denies any eye pain, vision issues, and any recent insect bites or trauma to the area. In addition to these symptoms, the patient reports having a sinus infection a few days prior to the onset of current symptoms, characterized by nasal congestion. The patient reports taking Claritin for his sinus.CT scan without contrast was performed, preliminary results showed pronounced swelling with subcutaneous fat stranding of the preseptal soft tissues of the left orbit. Ophthalmology was consulted by the ED, and they examined the patient, recommending the application of erythromycin ointment 6X daily and valacyclovir 1000mg TID for at least 7 days. Patient is not up to date with his vaccinations. Patient denies fever, chills, malaise, and any history of similar episodes in the past. Patient was admitted 2 weeks ago at a different hospital due to chest pain.     Review of  Systems:  Review of Systems   Constitutional: Negative.    HENT:  Positive for hearing loss. Negative for congestion, ear discharge, ear pain, nosebleeds, sinus pain and tinnitus.    Eyes:  Positive for discharge (Watery discharge from left eye) and redness (Eye redness of left eye). Negative for double vision, photophobia and pain.   Respiratory: Negative.     Cardiovascular: Negative.    Gastrointestinal: Negative.    Genitourinary: Negative.    Musculoskeletal: Negative.    Skin: Negative.    Neurological: Negative.    Endo/Heme/Allergies: Negative.    Psychiatric/Behavioral: Negative.         Past Medical History:   Diagnosis Date    Anemia     Diabetes mellitus     Hyperlipidemia     Hypertension     Thrombocytopenia      Past Surgical History:   Procedure Laterality Date    ANGIOGRAM, CORONARY, WITH LEFT HEART CATHETERIZATION      APPENDECTOMY      Cataract Surgery      CORONARY ANGIOPLASTY WITH STENT PLACEMENT       Social History[1]  Family History   Problem Relation Name Age of Onset    Coronary artery disease Mother      Diabetes Mother      Hypertension Mother       Home Medications:  Current Outpatient Medications   Medication Instructions    albuterol (PROVENTIL/VENTOLIN HFA) 90 mcg/actuation inhaler 1 puff, 2 times daily    amLODIPine (NORVASC) 5 MG tablet TAKE 1 TABLET BY MOUTH DAILY AT 11 AM    ascorbic acid, vitamin C, (VITAMIN C) 500 MG tablet Take 1 tablet (500 mg) by mouth twice daily with oral iron.    carvediloL (COREG) 6.25 MG tablet TAKE 1 TABLET BY MOUTH TWICE DAILY FOR BLOOD PRESSURE AND HEART    COMBIGAN 0.2-0.5 % Drop INSTILL 1 DROP BOTH EYES TWICE DAILY    empagliflozin (JARDIANCE) 10 mg, Daily    ferrous gluconate (FERGON) 324 mg, Oral, 2 times daily, Take 2 hours after eating    fluticasone-umeclidin-vilanter (TRELEGY ELLIPTA) 200-62.5-25 mcg inhaler Inhale into the lungs.    glipiZIDE (GLUCOTROL) 10 mg, 2 times daily    isosorbide mononitrate (IMDUR) 120 mg, Nightly    levocetirizine  (XYZAL) 5 mg, Nightly    lisinopriL (PRINIVIL,ZESTRIL) 20 mg    magnesium oxide (MAG-OX) 400 mg (241.3 mg magnesium) tablet 1 tablet, 2 times daily    metFORMIN (GLUCOPHAGE) 500 mg    ranolazine (RANEXA) 1,000 mg, 2 times daily    rosuvastatin (CRESTOR) 20 mg, Nightly    spironolactone (ALDACTONE) 25 mg, Daily      Allergies:  Review of patient's allergies indicates:  No Known Allergies    OBJECTIVE   Vital Signs:  Initial Vitals in ED Most Recent Vitals   Temp: 99.3 °F (37.4 °C)  99 °F (37.2 °C)   BP: (!) 157/82  (!) 153/78    Pulse: 66  73   Resp: 18  18    SpO2: 98 %  100 %    Body mass index is 25.02 kg/m².    Physical Exam  Constitutional:       General: He is in acute distress.      Appearance: Normal appearance.   Eyes:      General:         Right eye: No discharge.         Left eye: Discharge (watery discharge) present.  Cardiovascular:      Rate and Rhythm: Normal rate.      Pulses: Normal pulses.   Pulmonary:      Effort: Pulmonary effort is normal.   Abdominal:      General: Bowel sounds are normal.   Musculoskeletal:      Cervical back: Normal range of motion.   Neurological:      Mental Status: He is oriented to person, place, and time.         Labs:  CBC:  Recent Labs   Lab 07/06/25  2334   WBC 3.36*   HGB 12.2*   HCT 38.1*      MCV 92.5   RDW 15.3     BMP/CMP:  Recent Labs   Lab 07/06/25  2334   *   K 4.4   *   CO2 18*   BUN 15.0   CREATININE 1.11   EGFRNORACEVR >60     RFTs/LFTs:  Recent Labs   Lab 07/06/25  2334   CALCIUM 8.2*   ALBUMIN 3.8   AST 14   ALT 11   ALKPHOS 106   BILITOT 0.3     Recent Labs   Lab 07/07/25  0122   MG 2.00   PHOS 1.1*     Cardiac Panel:  Recent Labs   Lab 07/06/25  2334 07/07/25  0129   TROPONINI 0.104* 0.090*     Lipid Panel:  Lab Results   Component Value Date    CHOL 102 11/04/2022    HDL 37 (L) 11/04/2022    TRIG 79 11/04/2022     Diabetes Panel:  Lab Results   Component Value Date    HGBA1C 5.9 07/06/2025     Thyroid Panel:  No results found for:  ""TSH", "[id number]", "TOTALT3", "THYROIDAB"  Anemia Panel:  No results found for: "IRON", "TIBC", "FERRITIN", "JYFXZFVT60", "FOLATE"  Coagulation Panel:  No results for input(s): "PT", "INR", "PTT" in the last 168 hours.  No results for input(s): "DDIMER", "FIBRINOGEN", "ANTIXA" in the last 168 hours.    Invalid input(s): "HEPXA"  Urinalysis:  Lab Results   Component Value Date    APPEARANCEUA CLEAR 10/04/2018    PROTEINUA Negative 10/04/2018    LEUKOCYTESUR Negative 10/04/2018    RBCUA NONE SEEN 10/04/2018    WBCUA NONE SEEN 10/04/2018    BACTERIA NONE SEEN 10/04/2018      Urine Drug Screening:  No results found for: "AMPHETAMINES", "BARBITURATES", "LABBENZ", "CANNABUR", "COCAINEMETAB", "FENTANYL", "MDMA", "OPIATESCREEN", "PCDSOPHENCYN"  Imaging  CT Orbits Sella Post Fossa Without Cont  START OF REPORT:  Technique: Noncontrast CT scan of the orbits with direct axial, sagittal and coronal reconstruction images.    Clinical history: Orbital cellulitis suspected.    Dosage Information: Automated Exposure Control was utilized 220.84 mGy.cm.    Findings:  Facial soft tissues: There is pronounced swelling with subcutaneous fat stranding of the preseptal soft tissues of the left orbit extending to the left zygomaticomaxillary area with no post septal involvement identified. No organized fluid collection is seen to suggest an abscess and the underlying bones appear intact.  Orbital soft tissues:  preseptal soft tissues: The right preseptal soft tissues appear unremarkable.  Post- septal soft tissues: The bilateral preseptal soft tissues appear unremarkable. The bilateral extraconal soft tissues appear unremarkable. The bilateral intraconal soft tissues appear unremarkable.  Bones:  Orbital bony structures: The bilateral orbital bony structures are intact with no orbital fracture identified.  Maxilla:  Pterygoid plates: No fracture identified of the right or left pterygoid plates.  Zygoma: The zygomatic arches are " intact.  TMJ: The mandibular condyles appear normally placed with respect to the mandibular fossa.  Nasal Bones: No displaced nasal bone fracture is seen.  Skull: No acute linear or depressed fracture is identified in the visualized skull.  Paranasal sinuses: The visualized paranasal sinuses appear clear with no mucoperiosteal thickening or air fluid levels identified.  Mastoid air cells: The visualized mastoid air cells appear clear.  Brain: The visualized brain appears unremarkable.    Impression:  1. There is pronounced swelling with subcutaneous fat stranding of the preseptal soft tissues of the left orbit extending to the left zygomaticomaxillary area with no post septal involvement identified. No organized fluid collection is seen to suggest an abscess and the underlying bones appear intact. This is consistent with periorbital cellulitis. Correlate with clinical and laboratory parameters and recommend additional evaluation and follow-up to resolution as indicated.  2. Details and other findings as noted above.    EKG  Normal sinus rhythm  Right bundle branch block  Left anterior fascicular block     Microbiology  Microbiology Results (last 7 days)       ** No results found for the last 168 hours. **           Antibiotics  Antibiotics (From admission, onward)      Start     Stop Route Frequency Ordered    07/07/25 0900  mupirocin 2 % ointment         07/12/25 0859 Nasl 2 times daily 07/07/25 0126    07/07/25 0300  erythromycin 5 mg/gram (0.5 %) ophthalmic ointment         07/08/25 0159 LEFT EYE 6 times daily 07/07/25 0149           Pathology  Pathology Results  (Last 365 days)      None               ASSESSMENT AND PLAN   Periorbital cellulitis  - Preliminary results of CT scan without contrast shows  pronounced swelling with subcutaneous fat stranding of the preseptal soft tissues of the left orbit extending to the left zygomaticomaxillary area.This is consistent with periorbital cellulitis  - Apply  Erythromycin ointment to affected eye 6X daily   - Ordered Vancomycin 1000mg over 90 min, every 24 hours  - Continue to monitor for any signs of worsening infection or abscess formation    Shingles  - Crusty lesion present on left V1 distribution   - ordered Valacyclovir 500mg oral every 12 hours   - Educate patient on importance of staying up to date with vaccination     CAD  Chest Pain  - Patient has a history of hypertension and CAD  - Admitted 2 weeks ago due to chest pain  - Troponin 0.090  - EKG done in ED (7/7/25) shows Normal sinus rhythm Right bundle branch block Left anterior fascicular block    - Trending troponin levels for total of 3 values   - Trending EKG for total of 3 values  - Patient on telemetry      DVT Prophylaxis: Enoxaparin 40mg  Diet: Diet Heart Healthy Fluid - 1500mL  Code Status: Full Code    Disposition: 84 year old male admitted due to left eye swelling and left forehead lesion. Ordered antibiotics and valacyclovir.        Please appreciate attending's attestation to follow.    Erin Ralph MD  [facility] Internal [facility], PGY-1  07/07/2025               [1]   Social History  Tobacco Use    Smoking status: Never    Smokeless tobacco: Never   Substance Use Topics    Alcohol use: Not Currently    Drug use: Never

## 2025-07-07 NOTE — PROGRESS NOTES
"Pharmacokinetic Initial Assessment: IV Vancomycin    Assessment/Plan:    Initiate intravenous vancomycin with loading dose of 1500 mg once followed by a maintenance dose of vancomycin 1000mg IV every 24 hours  Desired empiric serum trough concentration is 10 to 20 mcg/mL  Draw vancomycin trough level 60 min prior to third dose on 07/09 at approximately 0300  Pharmacy will continue to follow and monitor vancomycin.      Please contact pharmacy at extension [phone] with any questions regarding this assessment.     Thank you for the consult,   Imelda Nichols       Patient brief summary:  Alex Morgan is a 84 y.o. male initiated on antimicrobial therapy with IV Vancomycin for treatment of suspected skin & soft tissue infection    Drug Allergies:   Review of patient's allergies indicates:  No Known Allergies    Actual Body Weight:   70.3 kg    Renal Function:   Estimated Creatinine Clearance: 44.7 mL/min (based on SCr of 1.11 mg/dL).,     Dialysis Method (if applicable):  N/A    CBC (last 72 hours):  Recent Labs   Lab Result Units 07/06/25  2334   WBC x10(3)/mcL 3.36*   Hgb g/dL 12.2*   Hemoglobin A1c % 5.9   Hct % 38.1*   Platelet x10(3)/mcL 185   Mono % % 17.0   Eos % % 1.2   Basophil % % 0.6       Metabolic Panel (last 72 hours):  Recent Labs   Lab Result Units 07/06/25  2334 07/07/25  0122   Sodium mmol/L 135*  --    Potassium mmol/L 4.4  --    Chloride mmol/L 108*  --    CO2 mmol/L 18*  --    Glucose mg/dL 157*  --    Blood Urea Nitrogen mg/dL 15.0  --    Creatinine mg/dL 1.11  --    Albumin g/dL 3.8  --    Bilirubin Total mg/dL 0.3  --    ALP unit/L 106  --    AST unit/L 14  --    ALT unit/L 11  --    Magnesium Level mg/dL  --  2.00   Phosphorus Level mg/dL  --  1.1*       Drug levels (last 3 results):  No results for input(s): "VANCOMYCINRA", "VANCORANDOM", "VANCOMYCINPE", "VANCOPEAK", "VANCOMYCINTR", "VANCOTROUGH" in the last 72 hours.    Microbiologic Results:  Microbiology Results (last 7 days)       ** No " results found for the last 168 hours. **

## 2025-07-08 LAB
ABS NEUT CALC (OHS): 2.27 X10(3)/MCL (ref 2.1–9.2)
ALBUMIN SERPL-MCNC: 3.3 G/DL (ref 3.4–4.8)
ALBUMIN/GLOB SERPL: 1 RATIO (ref 1.1–2)
ALP SERPL-CCNC: 81 UNIT/L (ref 40–150)
ALT SERPL-CCNC: 12 UNIT/L (ref 0–55)
ANION GAP SERPL CALC-SCNC: 7 MEQ/L
AST SERPL-CCNC: 22 UNIT/L (ref 11–45)
BASOPHILS NFR BLD MANUAL: 0.08 X10(3)/MCL (ref 0–0.2)
BASOPHILS NFR BLD MANUAL: 2 % (ref 0–2)
BILIRUB SERPL-MCNC: 0.3 MG/DL
BUN SERPL-MCNC: 20.7 MG/DL (ref 8.4–25.7)
CALCIUM SERPL-MCNC: 7.9 MG/DL (ref 8.8–10)
CHLORIDE SERPL-SCNC: 108 MMOL/L (ref 98–107)
CO2 SERPL-SCNC: 18 MMOL/L (ref 23–31)
CREAT SERPL-MCNC: 1.18 MG/DL (ref 0.72–1.25)
CREAT/UREA NIT SERPL: 18
EOSINOPHIL NFR BLD MANUAL: 0.08 X10(3)/MCL (ref 0–0.9)
EOSINOPHIL NFR BLD MANUAL: 2 % (ref 0–8)
ERYTHROCYTE [DISTWIDTH] IN BLOOD BY AUTOMATED COUNT: 14.8 % (ref 11.5–17)
GFR SERPLBLD CREATININE-BSD FMLA CKD-EPI: >60 ML/MIN/1.73/M2
GLOBULIN SER-MCNC: 3.2 GM/DL (ref 2.4–3.5)
GLUCOSE SERPL-MCNC: 169 MG/DL (ref 82–115)
HCT VFR BLD AUTO: 32.4 % (ref 42–52)
HGB BLD-MCNC: 10.7 G/DL (ref 14–18)
HOLD SPECIMEN: NORMAL
LYMPHOCYTES NFR BLD MANUAL: 0.97 X10(3)/MCL (ref 0.6–4.6)
LYMPHOCYTES NFR BLD MANUAL: 24 % (ref 13–40)
MAGNESIUM SERPL-MCNC: 2 MG/DL (ref 1.6–2.6)
MCH RBC QN AUTO: 29.7 PG (ref 27–31)
MCHC RBC AUTO-ENTMCNC: 33 G/DL (ref 33–36)
MCV RBC AUTO: 90 FL (ref 80–94)
MONOCYTES NFR BLD MANUAL: 0.65 X10(3)/MCL (ref 0.1–1.3)
MONOCYTES NFR BLD MANUAL: 16 % (ref 2–11)
NEUTROPHILS NFR BLD MANUAL: 56 % (ref 47–80)
NRBC BLD AUTO-RTO: 0 %
PHOSPHATE SERPL-MCNC: 1.1 MG/DL (ref 2.3–4.7)
PLATELET # BLD AUTO: 155 X10(3)/MCL (ref 130–400)
PLATELET # BLD EST: ADEQUATE 10*3/UL
PMV BLD AUTO: 8.5 FL (ref 7.4–10.4)
POCT GLUCOSE: 206 MG/DL (ref 70–110)
POCT GLUCOSE: 226 MG/DL (ref 70–110)
POCT GLUCOSE: 369 MG/DL (ref 70–110)
POCT GLUCOSE: 394 MG/DL (ref 70–110)
POTASSIUM SERPL-SCNC: 4 MMOL/L (ref 3.5–5.1)
PROT SERPL-MCNC: 6.5 GM/DL (ref 5.8–7.6)
RBC # BLD AUTO: 3.6 X10(6)/MCL (ref 4.7–6.1)
RBC MORPH BLD: NORMAL
SODIUM SERPL-SCNC: 133 MMOL/L (ref 136–145)
TROPONIN I SERPL-MCNC: 0.12 NG/ML (ref 0–0.04)
WBC # BLD AUTO: 4.05 X10(3)/MCL (ref 4.5–11.5)

## 2025-07-08 PROCEDURE — 63600175 PHARM REV CODE 636 W HCPCS

## 2025-07-08 PROCEDURE — 36415 COLL VENOUS BLD VENIPUNCTURE: CPT

## 2025-07-08 PROCEDURE — 83735 ASSAY OF MAGNESIUM: CPT | Performed by: INTERNAL MEDICINE

## 2025-07-08 PROCEDURE — 21400001 HC TELEMETRY ROOM

## 2025-07-08 PROCEDURE — 84484 ASSAY OF TROPONIN QUANT: CPT

## 2025-07-08 PROCEDURE — 80053 COMPREHEN METABOLIC PANEL: CPT

## 2025-07-08 PROCEDURE — 25000003 PHARM REV CODE 250

## 2025-07-08 PROCEDURE — 85027 COMPLETE CBC AUTOMATED: CPT

## 2025-07-08 PROCEDURE — 63600175 PHARM REV CODE 636 W HCPCS: Performed by: INTERNAL MEDICINE

## 2025-07-08 PROCEDURE — 93005 ELECTROCARDIOGRAM TRACING: CPT

## 2025-07-08 PROCEDURE — 94761 N-INVAS EAR/PLS OXIMETRY MLT: CPT

## 2025-07-08 PROCEDURE — 87040 BLOOD CULTURE FOR BACTERIA: CPT

## 2025-07-08 PROCEDURE — 84100 ASSAY OF PHOSPHORUS: CPT | Performed by: INTERNAL MEDICINE

## 2025-07-08 PROCEDURE — 25000003 PHARM REV CODE 250: Performed by: INTERNAL MEDICINE

## 2025-07-08 PROCEDURE — 94640 AIRWAY INHALATION TREATMENT: CPT

## 2025-07-08 RX ORDER — ASPIRIN 325 MG
325 TABLET ORAL DAILY
Status: DISCONTINUED | OUTPATIENT
Start: 2025-07-08 | End: 2025-07-09 | Stop reason: HOSPADM

## 2025-07-08 RX ORDER — HYDROCODONE BITARTRATE AND ACETAMINOPHEN 10; 325 MG/1; MG/1
1 TABLET ORAL EVERY 4 HOURS PRN
Refills: 0 | Status: DISCONTINUED | OUTPATIENT
Start: 2025-07-08 | End: 2025-07-09 | Stop reason: HOSPADM

## 2025-07-08 RX ORDER — ENOXAPARIN SODIUM 100 MG/ML
1 INJECTION SUBCUTANEOUS EVERY 12 HOURS
Status: DISCONTINUED | OUTPATIENT
Start: 2025-07-08 | End: 2025-07-09

## 2025-07-08 RX ORDER — HEPARIN SODIUM 5000 [USP'U]/ML
INJECTION, SOLUTION INTRAVENOUS; SUBCUTANEOUS EVERY 8 HOURS
Status: CANCELLED | OUTPATIENT
Start: 2025-07-08

## 2025-07-08 RX ORDER — IBUPROFEN 400 MG/1
400 TABLET, FILM COATED ORAL EVERY 6 HOURS PRN
Status: DISCONTINUED | OUTPATIENT
Start: 2025-07-08 | End: 2025-07-09 | Stop reason: HOSPADM

## 2025-07-08 RX ORDER — ACETAMINOPHEN 325 MG/1
650 TABLET ORAL EVERY 4 HOURS PRN
Status: DISCONTINUED | OUTPATIENT
Start: 2025-07-08 | End: 2025-07-08

## 2025-07-08 RX ORDER — VALACYCLOVIR HYDROCHLORIDE 1 G/1
1000 TABLET, FILM COATED ORAL EVERY 12 HOURS
Status: DISCONTINUED | OUTPATIENT
Start: 2025-07-08 | End: 2025-07-09

## 2025-07-08 RX ADMIN — PIPERACILLIN SODIUM AND TAZOBACTAM SODIUM 4.5 G: 4; .5 INJECTION, POWDER, LYOPHILIZED, FOR SOLUTION INTRAVENOUS at 06:07

## 2025-07-08 RX ADMIN — PIPERACILLIN SODIUM AND TAZOBACTAM SODIUM 4.5 G: 4; .5 INJECTION, POWDER, LYOPHILIZED, FOR SOLUTION INTRAVENOUS at 10:07

## 2025-07-08 RX ADMIN — TIMOLOL MALEATE 1 DROP: 5 SOLUTION OPHTHALMIC at 04:07

## 2025-07-08 RX ADMIN — INSULIN ASPART 1 UNITS: 100 INJECTION, SOLUTION INTRAVENOUS; SUBCUTANEOUS at 08:07

## 2025-07-08 RX ADMIN — ALBUTEROL SULFATE 1 PUFF: 90 AEROSOL, METERED RESPIRATORY (INHALATION) at 06:07

## 2025-07-08 RX ADMIN — OXYCODONE HYDROCHLORIDE AND ACETAMINOPHEN 500 MG: 500 TABLET ORAL at 10:07

## 2025-07-08 RX ADMIN — SPIRONOLACTONE 25 MG: 25 TABLET, FILM COATED ORAL at 10:07

## 2025-07-08 RX ADMIN — INSULIN ASPART 2 UNITS: 100 INJECTION, SOLUTION INTRAVENOUS; SUBCUTANEOUS at 10:07

## 2025-07-08 RX ADMIN — RANOLAZINE 1000 MG: 500 TABLET, EXTENDED RELEASE ORAL at 08:07

## 2025-07-08 RX ADMIN — TIMOLOL MALEATE 1 DROP: 5 SOLUTION OPHTHALMIC at 03:07

## 2025-07-08 RX ADMIN — BRIMONIDINE TARTRATE 1 DROP: 2 SOLUTION OPHTHALMIC at 04:07

## 2025-07-08 RX ADMIN — MUPIROCIN: 20 OINTMENT TOPICAL at 10:07

## 2025-07-08 RX ADMIN — ALBUTEROL SULFATE 1 PUFF: 90 AEROSOL, METERED RESPIRATORY (INHALATION) at 08:07

## 2025-07-08 RX ADMIN — INSULIN ASPART 5 UNITS: 100 INJECTION, SOLUTION INTRAVENOUS; SUBCUTANEOUS at 12:07

## 2025-07-08 RX ADMIN — HYDROCODONE BITARTRATE AND ACETAMINOPHEN 1 TABLET: 10; 325 TABLET ORAL at 06:07

## 2025-07-08 RX ADMIN — INSULIN ASPART 5 UNITS: 100 INJECTION, SOLUTION INTRAVENOUS; SUBCUTANEOUS at 06:07

## 2025-07-08 RX ADMIN — VALACYCLOVIR 1000 MG: 1 TABLET, FILM COATED ORAL at 10:07

## 2025-07-08 RX ADMIN — RANOLAZINE 1000 MG: 500 TABLET, EXTENDED RELEASE ORAL at 10:07

## 2025-07-08 RX ADMIN — LISINOPRIL 20 MG: 10 TABLET ORAL at 10:07

## 2025-07-08 RX ADMIN — PIPERACILLIN SODIUM AND TAZOBACTAM SODIUM 4.5 G: 4; .5 INJECTION, POWDER, LYOPHILIZED, FOR SOLUTION INTRAVENOUS at 03:07

## 2025-07-08 RX ADMIN — Medication 325 MG: at 10:07

## 2025-07-08 RX ADMIN — SODIUM CHLORIDE: 9 INJECTION, SOLUTION INTRAVENOUS at 06:07

## 2025-07-08 RX ADMIN — ISOSORBIDE MONONITRATE 120 MG: 30 TABLET, EXTENDED RELEASE ORAL at 08:07

## 2025-07-08 RX ADMIN — AMLODIPINE BESYLATE 5 MG: 5 TABLET ORAL at 10:07

## 2025-07-08 RX ADMIN — MUPIROCIN: 20 OINTMENT TOPICAL at 08:07

## 2025-07-08 RX ADMIN — ENOXAPARIN SODIUM 80 MG: 80 INJECTION SUBCUTANEOUS at 09:07

## 2025-07-08 RX ADMIN — VALACYCLOVIR 1000 MG: 1 TABLET, FILM COATED ORAL at 08:07

## 2025-07-08 RX ADMIN — ENOXAPARIN SODIUM 80 MG: 80 INJECTION SUBCUTANEOUS at 10:07

## 2025-07-08 RX ADMIN — BRIMONIDINE TARTRATE 1 DROP: 2 SOLUTION OPHTHALMIC at 03:07

## 2025-07-08 RX ADMIN — CARVEDILOL 6.25 MG: 3.12 TABLET, FILM COATED ORAL at 08:07

## 2025-07-08 RX ADMIN — ATORVASTATIN CALCIUM 20 MG: 20 TABLET, FILM COATED ORAL at 10:07

## 2025-07-08 RX ADMIN — VANCOMYCIN HYDROCHLORIDE 1000 MG: 1 INJECTION, POWDER, LYOPHILIZED, FOR SOLUTION INTRAVENOUS at 04:07

## 2025-07-08 NOTE — PROGRESS NOTES
[facility]  PROGRESS NOTE    Patient's Name: Alex Morgan  : 1940  MRN: [id number]    Admission Date: 2025  Length of Stay: 1  Attending Physician: Sanjeev Garcia MD  Resident: Dr. Gregory  Intern: Dr. Olmstead    SUBJECTIVE     Chief Complaint   Patient presents with    Eye Problem     Pt arrives [place] [facility] [place] [facility] [place] in the left eye      History of Present Illness:  Alex Morgan is a 84 y.o. male with a PMHx of CAD s/p PCI, BUDDY on CPAP, HTN, DM, HLD  who presents to [facility] for significant swelling in the left eye and a crusted lesion on the left forehead.Patient was seen at another medical center before coming to [facility]. The patient reports that the forehead lesion first appeared on Thursday and experienced minimal pain. The swelling around the left eye began on Friday morning and has progressively worsened, leading to puffiness and difficulty opening the left eye fully. Patient has watery discharge out of the left eye. Patient denies any eye pain, vision issues, and any recent insect bites or trauma to the area. In addition to these symptoms, the patient reports having a sinus infection a few days prior to the onset of current symptoms, characterized by nasal congestion. The patient reports taking Claritin for his sinus.CT scan without contrast was performed, preliminary results showed pronounced swelling with subcutaneous fat stranding of the preseptal soft tissues of the left orbit. Ophthalmology was consulted by the ED, and they examined the patient, recommending the application of erythromycin ointment 6X daily and valacyclovir 1000mg TID for at least 7 days. Patient is not up to date with his vaccinations. Patient denies fever, chills, malaise, and any history of similar episodes in the past. Patient was admitted 2 weeks ago at a different hospital due to chest pain.     Hospital Course/Significant Events:  2025: Admitted to  [facility].    Interval History:  Patient has had decreasing swelling over the left forehead and eye and feels more comfortable than day prior. Still is unable to open eyes fully. Patient does not complain of any chest pain, shortness of breath, weakness. Patient does have some blunted affect and does not report any pain, unsure if patient is downplaying pain or not.     Review of Systems:  A 12-pt ROS was conducted and was negative unless stated above.    OBJECTIVE     VITAL SIGNS: 24 HR MIN & MAX Most Recent Vitals   Temp  Min: 98.1 °F (36.7 °C)  Max: 101.7 °F (38.7 °C)  98.2 °F (36.8 °C)   BP  Min: 100/58  Max: 155/76  112/70    Pulse  Min: 56  Max: 78  (!) 56   Resp  Min: 16  Max: 18  18    SpO2  Min: 94 %  Max: 99 %  98 %    Body mass index is 28.57 kg/m².    Intake/Output:  I/O last 3 completed shifts:  In: 360 [P.O.:360]  Out: 1350 [Urine:1350]  Physical Exam  Constitutional:       Appearance: Normal appearance.   HENT:      Head: Normocephalic.   Eyes:      General:         Left eye: Discharge present.     Comments: Left eye crusted with upper lid edema. Honey color crusting over left forehead and brow. Interval decrease in swelling over brow ridge and forehead.   Cardiovascular:      Rate and Rhythm: Normal rate and regular rhythm.      Pulses: Normal pulses.      Heart sounds: No murmur heard.  Pulmonary:      Effort: No respiratory distress.      Breath sounds: No wheezing.   Abdominal:      General: Abdomen is flat.      Palpations: Abdomen is soft.      Tenderness: There is no abdominal tenderness.   Skin:     General: Skin is warm.      Capillary Refill: Capillary refill takes less than 2 seconds.   Neurological:      General: No focal deficit present.      Mental Status: He is alert and oriented to person, place, and time.         Current Medications:  Scheduled:   albuterol  1 puff Inhalation BID    amLODIPine  5 mg Oral Daily    ascorbic acid (vitamin C)  500 mg Oral Daily    aspirin  325 mg Oral  Daily    atorvastatin  20 mg Oral Daily    brimonidine 0.2%  1 drop Both Eyes Q12H    carvediloL  6.25 mg Oral BID    enoxparin  1 mg/kg Subcutaneous Q12H (treatment, non-standard time)    ferrous sulfate  1 tablet Oral Every other day    isosorbide mononitrate  120 mg Oral Nightly    lisinopriL  20 mg Oral Daily    mupirocin   Nasal BID    piperacillin-tazobactam (Zosyn) IV (PEDS and ADULTS) (extended infusion is not appropriate)  4.5 g Intravenous Q8H    ranolazine  1,000 mg Oral BID    spironolactone  25 mg Oral Daily    timolol maleate 0.5%  1 drop Both Eyes BID    valACYclovir  1,000 mg Oral Q12H    [START ON 7/9/2025] vancomycin (VANCOCIN) IV (PEDS and ADULTS)  1,000 mg Intravenous Q24H      Infusions:    PRNs:    Current Facility-Administered Medications:     0.9% NaCl, , Intravenous, PRN    dextrose 50%, 12.5 g, Intravenous, PRN    dextrose 50%, 25 g, Intravenous, PRN    glucagon (human recombinant), 1 mg, Intramuscular, PRN    glucose, 16 g, Oral, PRN    glucose, 24 g, Oral, PRN    HYDROcodone-acetaminophen, 1 tablet, Oral, Q4H PRN    ibuprofen, 400 mg, Oral, Q6H PRN    insulin aspart U-100, 0-5 Units, Subcutaneous, QID (AC + HS) PRN    melatonin, 6 mg, Oral, Nightly PRN    naloxone, 0.02 mg, Intravenous, PRN    sodium chloride 0.9%, 10 mL, Intravenous, Q12H PRN    Pharmacy to dose Vancomycin consult, , , Once **AND** vancomycin - pharmacy to dose, , Intravenous, pharmacy to manage frequency  Labs:  CBC:  Recent Labs   Lab 07/06/25  2334 07/08/25  0334   WBC 3.36* 4.05*   HGB 12.2* 10.7*   HCT 38.1* 32.4*    155   MCV 92.5 90.0   RDW 15.3 14.8     BMP/CMP:  Recent Labs   Lab 07/06/25  2334 07/08/25  0334   * 133*   K 4.4 4.0   * 108*   CO2 18* 18*   BUN 15.0 20.7   CREATININE 1.11 1.18   EGFRNORACEVR >60 >60     RFTs/LFTs:  Recent Labs   Lab 07/06/25  2334 07/08/25  0334   CALCIUM 8.2* 7.9*   ALBUMIN 3.8 3.3*   AST 14 22   ALT 11 12   ALKPHOS 106 81   BILITOT 0.3 0.3     Recent Labs   Lab  07/07/25  0122 07/08/25  0334   MG 2.00 2.00   PHOS 1.1* 1.1*     Cardiac Panel:  Recent Labs   Lab 07/07/25  0129 07/07/25  0910 07/07/25  1509 07/07/25  2112 07/08/25  0940   TROPONINI 0.090* 0.117* 0.137* 0.151* 0.122*     Interval Imaging:  X-Ray Chest PA And Lateral  Narrative: EXAMINATION:  XR CHEST PA AND LATERAL    CLINICAL HISTORY:  , Shortness of breath.    FINDINGS:  No alveolar consolidation, effusion, or pneumothorax is seen.   The thoracic aorta is normal  cardiac silhouette, central pulmonary vessels and mediastinum are normal in size and are grossly unremarkable.   visualized osseous structures are grossly unremarkable.  Impression: No acute chest disease is identified.    Electronically signed by: Solo Trammell  Date:    07/07/2025  Time:    06:41  CT Orbits Sella Post Fossa Without Cont  Narrative: EXAMINATION:  CT ORBITS SELLA POST FOSSA WITHOUT CONT    CLINICAL HISTORY:  Orbital cellulitis suspected;    TECHNIQUE:  Axial scans were obtained through the orbits.    Coronal and sagittal reconstructions obtained from the axial data.    Automatic exposure control was utilized to limit radiation dose.    Contrast: None    Radiation Dose:    Total DLP: 221 mGy*cm    COMPARISON:  None    FINDINGS:  There is left periorbital soft tissue swelling.  The globes are normal in contour.  There has been prior bilateral cataract surgery.  The optic nerves are intact.  The extraocular muscles are symmetric.  There is no postseptal abnormality of the orbits.  There is no acute bony abnormality.  The paranasal sinuses are clear.  Impression: Left periorbital soft tissue swelling    No significant change from the [facility] interpretation.    Electronically signed by: Sue Mack  Date:    07/07/2025  Time:    06:36     Microbiology:  Microbiology Results (last 7 days)       Procedure Component Value Units Date/Time    Blood Culture [[id number]] Collected: 07/08/25 0732    Order Status: Sent Specimen: Blood  "from Hand, Left Updated: 07/08/25 0758    Blood Culture [[id number]] Collected: 07/08/25 0723    Order Status: Sent Specimen: Blood from Antecubital, Left Updated: 07/08/25 0756          Antibiotics:  Antibiotics (From admission, onward)      Start     Stop Route Frequency Ordered    07/09/25 0430  vancomycin (VANCOCIN) 1,000 mg in 0.9% NaCl 250 mL IVPB (admixture device)         -- IV Every 24 hours (non-standard times) 07/08/25 0636    07/08/25 0730  vancomycin - pharmacy to dose  (vancomycin IVPB (PEDS and ADULTS))        Placed in "And" Linked Group    -- IV pharmacy to manage frequency 07/08/25 0631    07/07/25 1045  piperacillin-tazobactam (ZOSYN) 4.5 g in D5W 100 mL IVPB (MB+)         -- IV Every 8 hours (non-standard times) 07/07/25 0933    07/07/25 0900  mupirocin 2 % ointment         07/12/25 0859 Nasl 2 times daily 07/07/25 0126             ASSESSMENT AND PLAN   Periorbital cellulitis  - Preliminary results of CT scan without contrast shows  pronounced swelling with subcutaneous fat stranding of the preseptal soft tissues of the left orbit extending to the left zygomaticomaxillary area.This is consistent with periorbital cellulitis  - Apply Erythromycin ointment to affected eye 6X daily   - Patient had fevers to 101.7 on 07/07, will continue systemic abx at this time with vanc and zosyn; no cultures obtained on admission to follow  - Continue to monitor for any signs of worsening infection or abscess formation     Shingles  - Crusty lesion present on left V1 distribution   - On Valacyclovir 1000mg oral every 12 hours   - Educate patient on importance of staying up to date with vaccination      CAD  NSTEMI  - Patient has a history of hypertension and CAD  - Admitted 2 weeks ago due to chest pain  - Troponin 0.090 on admission, uptrending to 0.15, patient currently not experiencing chest pain  - EKG showed T wave inversions on anterior leads, repeat EKG showed reversal of changes    - Consulted cards for " evaluation of dynamic EKG changes and uptrending troponin  - Started aspirin and full dose lovenox  - Patient on telemetry      DVT Prophylaxis: Lovenox  GI Prophylaxis: n/a  Abx: vanc and zosyn  Access: IV on right  Fluids: received in ED  Diet: Diet Heart Healthy Fluid - 1500mL; Isolation Tray - Styrofoam    Code Status: Full Code    Disposition: 84 y.o. male admitted for UTI w/ ALEJANDRA. Disposition pending improvement in kidney function and clinical improvement.       Case was discussed and seen with Dr. Garcia. Please appreciate attending's attestation to follow.    Brady Olmstead MD  PGY-1 Resident  [facility] Team 1  07/08/2025

## 2025-07-08 NOTE — PROGRESS NOTES
VANCOMYCIN DOSING BY PHARMACY DISCONTINUATION NOTE    Alex Morgan is a 84 y.o. male who had been consulted for vancomycin dosing.    The pharmacy consult for vancomycin dosing has been discontinued.     Vancomycin Dosing by Pharmacy Consult will sign-off. Please reconsult if necessary. Thank you for allowing us to participate in this patient's care.     Sandrine Schwartz, MelitaD

## 2025-07-08 NOTE — PROGRESS NOTES
Pharmacist Renal Dose Adjustment Note    Alex Morgan is a 84 y.o. male being treated with the medication valacyclovir    Patient Data:    Vital Signs (Most Recent):  Temp: 98.2 °F (36.8 °C) (07/08/25 0751)  Pulse: (!) 56 (07/08/25 0751)  Resp: 18 (07/08/25 0751)  BP: 112/70 (07/08/25 0751)  SpO2: 98 % (07/08/25 0751) Vital Signs (72h Range):  Temp:  [98.1 °F (36.7 °C)-101.7 °F (38.7 °C)]   Pulse:  [56-78]   Resp:  [16-18]   BP: (100-157)/(58-82)   SpO2:  [94 %-100 %]      Recent Labs   Lab 07/06/25  2334 07/08/25  0334   CREATININE 1.11 1.18     Serum creatinine: 1.18 mg/dL 07/08/25 0334  Estimated creatinine clearance: 46.4 mL/min    Medication:valacyclovir dose: 1,000 mg frequency q8h will be changed to medication:valacyclovir dose:1,000 mg frequency:q12h for CrCl < 50 mL/min    Pharmacist's Name: Bucky uRffin  Pharmacist's Extension: [phone]

## 2025-07-08 NOTE — CONSULTS
"[facility]  Inpatient Cardiology Consult    Reason for Consult: "NSTEMI"                               HPI:  Alex Morgan 84 y.o. male with PMHx of CAD, BUDDY on CPAP, HTN, DM, HLD who presented with left facial swelling admitted as transfer for periorbital cellulitis and  shingles to the Left face. On admission, he was noted elevated troponins 0.1 on admission with peak at 0.15. He has remained chest pain free since admission. EKGs since admission have shown dynamic ST changes in inferior and V2-V6. Cardiology consulted for concerns for NSTEMI      He reports an episode of substernal chest tightness and pressure on exertion 2 weeks ago. Uncertain of duration but he took NTG x3 which improved symptoms. He denies any palpitations. Does have SOB at baseline which is exacerbate with exertion. He denies any episodes of loss of consciousness or dizziness. Denies orthopnea, PND or leg edema                                                                                                                                                                                                                                                                                                                                                                                                                                                                                   Prior to Admission medications    Medication Sig Start Date End Date Taking? Authorizing Provider   albuterol (PROVENTIL/VENTOLIN HFA) 90 mcg/actuation inhaler Inhale 1 puff into the lungs 2 (two) times daily. 3/24/22  Yes Provider, Historical   carvediloL (COREG) 6.25 MG tablet TAKE 1 TABLET BY MOUTH TWICE DAILY FOR BLOOD PRESSURE AND HEART 2/11/22  Yes Provider, Historical   COMBIGAN 0.2-0.5 % Drop INSTILL 1 DROP BOTH EYES TWICE DAILY 2/15/22  Yes Provider, Historical   empagliflozin (JARDIANCE) 10 mg tablet Take 10 mg by mouth once daily.   Yes Provider, Historical   glipiZIDE " (GLUCOTROL) 10 MG tablet Take 10 mg by mouth 2 (two) times daily. 4/3/22  Yes Provider, Historical   levocetirizine (XYZAL) 5 MG tablet Take 5 mg by mouth every evening.   Yes Provider, Historical   lisinopriL (PRINIVIL,ZESTRIL) 20 MG tablet Take 20 mg by mouth. 1/24/22  Yes Provider, Historical   magnesium oxide (MAG-OX) 400 mg (241.3 mg magnesium) tablet Take 1 tablet by mouth 2 (two) times daily. 5/3/22  Yes Provider, Historical   rosuvastatin (CRESTOR) 20 MG tablet Take 20 mg by mouth every evening. 5/3/22  Yes Provider, Historical   spironolactone (ALDACTONE) 25 MG tablet Take 25 mg by mouth once daily.   Yes Provider, Historical   amLODIPine (NORVASC) 5 MG tablet TAKE 1 TABLET BY MOUTH DAILY AT 11 AM 4/12/22   Provider, Historical   ascorbic acid, vitamin C, (VITAMIN C) 500 MG tablet Take 1 tablet (500 mg) by mouth twice daily with oral iron. 10/3/24   Bekah Waite, NP   ferrous gluconate (FERGON) 324 MG tablet Take 1 tablet (324 mg total) by mouth 2 (two) times a day. Take 2 hours after eating 3/27/25   Bekah Waite, NP   fluticasone-umeclidin-vilanter (TRELEGY ELLIPTA) 200-62.5-25 mcg inhaler Inhale into the lungs.    Provider, Historical   isosorbide mononitrate (IMDUR) 60 MG 24 hr tablet Take 120 mg by mouth nightly. 4/12/22   Provider, Historical   metFORMIN (GLUCOPHAGE) 500 MG tablet Take 500 mg by mouth.    Provider, Historical   ranolazine (RANEXA) 1,000 mg Tb12 Take 1,000 mg by mouth 2 (two) times daily.    Provider, Historical       Inpatient Scheduled Medications:   albuterol  1 puff Inhalation BID    amLODIPine  5 mg Oral Daily    ascorbic acid (vitamin C)  500 mg Oral Daily    aspirin  325 mg Oral Daily    atorvastatin  20 mg Oral Daily    brimonidine 0.2%  1 drop Both Eyes Q12H    carvediloL  6.25 mg Oral BID    enoxparin  1 mg/kg Subcutaneous Q12H (treatment, non-standard time)    ferrous sulfate  1 tablet Oral Every other day    isosorbide mononitrate  120 mg Oral Nightly     lisinopriL  20 mg Oral Daily    mupirocin   Nasal BID    piperacillin-tazobactam (Zosyn) IV (PEDS and ADULTS) (extended infusion is not appropriate)  4.5 g Intravenous Q8H    ranolazine  1,000 mg Oral BID    spironolactone  25 mg Oral Daily    timolol maleate 0.5%  1 drop Both Eyes BID    valACYclovir  1,000 mg Oral Q12H    [START ON 7/9/2025] vancomycin (VANCOCIN) IV (PEDS and ADULTS)  1,000 mg Intravenous Q24H           ROS:                                                                                                                                                                             Negative except as stated in the history of present illness. See HPI for details.    PHYSICAL EXAM:  VITALS: T 98.1 °F (36.7 °C)   /68   P (!) 55   RR 18   O2 99 %    OE:  Elderly male in no distress  HEENT- Left eye swollen shut, with crusted lesions to left face   CVS- bradycardic, regular. S1 ans S2 present, no murmurs. No pedal edema  RS-chest clear   ABD- soft NT, no organomegaly, BS normal  CNS- alert and awake. No focal deficits      All medications, laboratory studies, cardiac diagnostic imaging independently reviewed.     Cardiology:  [facility] 11/04/2022  Findings:   1. The left main artery is intact.   2.  The left anterior descending artery is a well sized vessel.  It   reaches apex.  It supplies septals and diagonals.  The distal LAD has   diffuse small vessel disease.  In the very apical section becomes a 0.5 mm   vessel.  There is a luminal irregularity and little plaque in the mid LAD.    There is a 30 to 40% eccentric plaque in the diagonal.   3.  The ramus artery is intact with only luminal irregularity.   4.  The circumflex artery is intact with only luminal irregularity.   5.  The right coronary artery has some 30% plaque in the proximal and mid   section.  The RCA PD has a 50% narrowing and has diffuse small vessel   disease is a very small vessel which tapers down to 1.0 to 1.5 mm.  It is   diffuse  small vessel disease and will be treated medically.   6.  Left ventricular ejection fraction is 50%.  Left ventricular   end-diastolic pressure was elevated at 30 consistent with diastolic   dysfunction.  There was no significant gradient across aortic valve.       ASSESSMENT/PLAN:    Chest pain   Elevated Troponin  PCI to PDA in 2012  -EKGs since admission with dynamic changes- T wave inversions (Not present on admission) in V2-V6 and inferior leads with improvement in V2-V3 on repeat EKG.  - troponins have been overall flat. Can stop trending for now. Low suspicion for NSTEMI at this time. Okay to discontinue treatment dose of lovenox.  -obtain formal TTE  -agree with statin  -agree with home anti-anginal- imdur 120mg,coreg 6.25 mg bid,ranolazine 1g BID.   -Continue home lisinopril 20mg daily and aldactone   - please obtain outside records from [place] as patient states he had a LHC 2 weeks ago when he had chest pain     Signed:    Janell David MD  Cardiology PGY4

## 2025-07-09 ENCOUNTER — TELEPHONE (OUTPATIENT)
Dept: HEMATOLOGY/ONCOLOGY | Facility: CLINIC | Age: 85
End: 2025-07-09
Payer: MEDICARE

## 2025-07-09 VITALS
SYSTOLIC BLOOD PRESSURE: 123 MMHG | RESPIRATION RATE: 18 BRPM | HEIGHT: 66 IN | BODY MASS INDEX: 28.45 KG/M2 | TEMPERATURE: 98 F | HEART RATE: 54 BPM | WEIGHT: 177 LBS | OXYGEN SATURATION: 99 % | DIASTOLIC BLOOD PRESSURE: 56 MMHG

## 2025-07-09 LAB
ALBUMIN SERPL-MCNC: 3 G/DL (ref 3.4–4.8)
ALBUMIN/GLOB SERPL: 1 RATIO (ref 1.1–2)
ALP SERPL-CCNC: 69 UNIT/L (ref 40–150)
ALT SERPL-CCNC: 14 UNIT/L (ref 0–55)
ANION GAP SERPL CALC-SCNC: 6 MEQ/L
APICAL FOUR CHAMBER EJECTION FRACTION: 68 %
APICAL TWO CHAMBER EJECTION FRACTION: 66 %
AST SERPL-CCNC: 23 UNIT/L (ref 11–45)
AV INDEX (PROSTH): 0.47
AV MEAN GRADIENT: 5 MMHG
AV PEAK GRADIENT: 10 MMHG
AV VALVE AREA BY VELOCITY RATIO: 2.1 CM²
AV VALVE AREA: 1.8 CM²
AV VELOCITY RATIO: 0.56
BASOPHILS # BLD AUTO: 0.01 X10(3)/MCL
BASOPHILS NFR BLD AUTO: 0.3 %
BILIRUB SERPL-MCNC: 0.3 MG/DL
BSA FOR ECHO PROCEDURE: 1.93 M2
BUN SERPL-MCNC: 20.5 MG/DL (ref 8.4–25.7)
CALCIUM SERPL-MCNC: 7.4 MG/DL (ref 8.8–10)
CHLORIDE SERPL-SCNC: 108 MMOL/L (ref 98–107)
CO2 SERPL-SCNC: 20 MMOL/L (ref 23–31)
CREAT SERPL-MCNC: 1.04 MG/DL (ref 0.72–1.25)
CREAT/UREA NIT SERPL: 20
CV ECHO LV RWT: 0.49 CM
DOP CALC AO PEAK VEL: 1.6 M/S
DOP CALC AO VTI: 31.6 CM
DOP CALC LVOT AREA: 3.8 CM2
DOP CALC LVOT DIAMETER: 2.2 CM
DOP CALC LVOT PEAK VEL: 0.9 M/S
DOP CALC LVOT STROKE VOLUME: 57 CM3
DOP CALC MV VTI: 32.8 CM
DOP CALCLVOT PEAK VEL VTI: 15 CM
E WAVE DECELERATION TIME: 308 MSEC
E/A RATIO: 0.57
E/E' RATIO: 9 M/S
ECHO LV POSTERIOR WALL: 1 CM (ref 0.6–1.1)
EOSINOPHIL # BLD AUTO: 0.11 X10(3)/MCL (ref 0–0.9)
EOSINOPHIL NFR BLD AUTO: 2.8 %
ERYTHROCYTE [DISTWIDTH] IN BLOOD BY AUTOMATED COUNT: 14.9 % (ref 11.5–17)
FRACTIONAL SHORTENING: 31.7 % (ref 28–44)
GFR SERPLBLD CREATININE-BSD FMLA CKD-EPI: >60 ML/MIN/1.73/M2
GLOBULIN SER-MCNC: 2.9 GM/DL (ref 2.4–3.5)
GLUCOSE SERPL-MCNC: 179 MG/DL (ref 82–115)
HCT VFR BLD AUTO: 29.3 % (ref 42–52)
HGB BLD-MCNC: 9.5 G/DL (ref 14–18)
HOLD SPECIMEN: NORMAL
HR MV ECHO: 59 BPM
IMM GRANULOCYTES # BLD AUTO: 0.06 X10(3)/MCL (ref 0–0.04)
IMM GRANULOCYTES NFR BLD AUTO: 1.5 %
INTERVENTRICULAR SEPTUM: 1.2 CM (ref 0.6–1.1)
LEFT ATRIUM SIZE: 4.9 CM
LEFT INTERNAL DIMENSION IN SYSTOLE: 2.8 CM (ref 2.1–4)
LEFT VENTRICLE DIASTOLIC VOLUME INDEX: 37.89 ML/M2
LEFT VENTRICLE DIASTOLIC VOLUME: 72 ML
LEFT VENTRICLE END DIASTOLIC VOLUME APICAL 2 CHAMBER: 73.78 ML
LEFT VENTRICLE END DIASTOLIC VOLUME APICAL 4 CHAMBER: 131.48 ML
LEFT VENTRICLE MASS INDEX: 79.6 G/M2
LEFT VENTRICLE SYSTOLIC VOLUME INDEX: 15.3 ML/M2
LEFT VENTRICLE SYSTOLIC VOLUME: 29 ML
LEFT VENTRICULAR INTERNAL DIMENSION IN DIASTOLE: 4.1 CM (ref 3.5–6)
LEFT VENTRICULAR MASS: 151.3 G
LV LATERAL E/E' RATIO: 9.6 M/S
LV SEPTAL E/E' RATIO: 8 M/S
LVED V (TEICH): 72.17 ML
LVES V (TEICH): 29.44 ML
LVOT MG: 1.88 MMHG
LVOT MV: 0.63 CM/S
LYMPHOCYTES # BLD AUTO: 1.29 X10(3)/MCL (ref 0.6–4.6)
LYMPHOCYTES NFR BLD AUTO: 32.8 %
MAGNESIUM SERPL-MCNC: 1.9 MG/DL (ref 1.6–2.6)
MCH RBC QN AUTO: 29.1 PG (ref 27–31)
MCHC RBC AUTO-ENTMCNC: 32.4 G/DL (ref 33–36)
MCV RBC AUTO: 89.9 FL (ref 80–94)
MONOCYTES # BLD AUTO: 0.54 X10(3)/MCL (ref 0.1–1.3)
MONOCYTES NFR BLD AUTO: 13.7 %
MV MEAN GRADIENT: 1 MMHG
MV PEAK A VEL: 0.84 M/S
MV PEAK E VEL: 0.48 M/S
MV PEAK GRADIENT: 4 MMHG
MV STENOSIS PRESSURE HALF TIME: 89.22 MS
MV VALVE AREA BY CONTINUITY EQUATION: 1.74 CM2
MV VALVE AREA P 1/2 METHOD: 2.47 CM2
NEUTROPHILS # BLD AUTO: 1.92 X10(3)/MCL (ref 2.1–9.2)
NEUTROPHILS NFR BLD AUTO: 48.9 %
NRBC BLD AUTO-RTO: 0 %
OHS CV RV/LV RATIO: 0.73 CM
OHS LV EJECTION FRACTION SIMPSONS BIPLANE MOD: 67 %
OHS QRS DURATION: 142 MS
OHS QTC CALCULATION: 494 MS
PHOSPHATE SERPL-MCNC: 1.1 MG/DL (ref 2.3–4.7)
PHOSPHATE SERPL-MCNC: 1.3 MG/DL (ref 2.3–4.7)
PLATELET # BLD AUTO: 150 X10(3)/MCL (ref 130–400)
PMV BLD AUTO: 9.2 FL (ref 7.4–10.4)
POCT GLUCOSE: 228 MG/DL (ref 70–110)
POTASSIUM SERPL-SCNC: 4.5 MMOL/L (ref 3.5–5.1)
PROT SERPL-MCNC: 5.9 GM/DL (ref 5.8–7.6)
RA PRESSURE ESTIMATED: 3 MMHG
RBC # BLD AUTO: 3.26 X10(6)/MCL (ref 4.7–6.1)
RIGHT VENTRICLE DIASTOLIC BASEL DIMENSION: 3 CM
RIGHT VENTRICULAR END-DIASTOLIC DIMENSION: 3 CM
SODIUM SERPL-SCNC: 134 MMOL/L (ref 136–145)
TDI LATERAL: 0.05 M/S
TDI SEPTAL: 0.06 M/S
TDI: 0.06 M/S
TRICUSPID ANNULAR PLANE SYSTOLIC EXCURSION: 1.7 CM
VANCOMYCIN TROUGH SERPL-MCNC: 9.1 UG/ML (ref 15–20)
WBC # BLD AUTO: 3.93 X10(3)/MCL (ref 4.5–11.5)
Z-SCORE OF LEFT VENTRICULAR DIMENSION IN END DIASTOLE: -2.63
Z-SCORE OF LEFT VENTRICULAR DIMENSION IN END SYSTOLE: -1.26

## 2025-07-09 PROCEDURE — 80202 ASSAY OF VANCOMYCIN: CPT

## 2025-07-09 PROCEDURE — 63600175 PHARM REV CODE 636 W HCPCS: Performed by: INTERNAL MEDICINE

## 2025-07-09 PROCEDURE — 94640 AIRWAY INHALATION TREATMENT: CPT

## 2025-07-09 PROCEDURE — 84100 ASSAY OF PHOSPHORUS: CPT | Performed by: INTERNAL MEDICINE

## 2025-07-09 PROCEDURE — 94761 N-INVAS EAR/PLS OXIMETRY MLT: CPT

## 2025-07-09 PROCEDURE — 84100 ASSAY OF PHOSPHORUS: CPT

## 2025-07-09 PROCEDURE — 36415 COLL VENOUS BLD VENIPUNCTURE: CPT

## 2025-07-09 PROCEDURE — 63600175 PHARM REV CODE 636 W HCPCS

## 2025-07-09 PROCEDURE — 25000003 PHARM REV CODE 250: Performed by: INTERNAL MEDICINE

## 2025-07-09 PROCEDURE — 80053 COMPREHEN METABOLIC PANEL: CPT

## 2025-07-09 PROCEDURE — 25000003 PHARM REV CODE 250

## 2025-07-09 PROCEDURE — 85025 COMPLETE CBC W/AUTO DIFF WBC: CPT

## 2025-07-09 PROCEDURE — 83735 ASSAY OF MAGNESIUM: CPT | Performed by: INTERNAL MEDICINE

## 2025-07-09 RX ORDER — HYDROCODONE BITARTRATE AND ACETAMINOPHEN 7.5; 325 MG/1; MG/1
1 TABLET ORAL EVERY 6 HOURS PRN
Qty: 12 TABLET | Refills: 0 | Status: SHIPPED | OUTPATIENT
Start: 2025-07-10 | End: 2025-07-14

## 2025-07-09 RX ORDER — ERYTHROMYCIN 5 MG/G
OINTMENT OPHTHALMIC NIGHTLY
Qty: 3.5 G | Refills: 0 | Status: SHIPPED | OUTPATIENT
Start: 2025-07-09 | End: 2025-07-15

## 2025-07-09 RX ORDER — CEFPODOXIME PROXETIL 200 MG/1
400 TABLET, FILM COATED ORAL 2 TIMES DAILY
Qty: 20 TABLET | Refills: 0 | Status: SHIPPED | OUTPATIENT
Start: 2025-07-10 | End: 2025-07-15

## 2025-07-09 RX ORDER — ENOXAPARIN SODIUM 100 MG/ML
40 INJECTION SUBCUTANEOUS EVERY 24 HOURS
Status: DISCONTINUED | OUTPATIENT
Start: 2025-07-09 | End: 2025-07-09 | Stop reason: HOSPADM

## 2025-07-09 RX ORDER — VALACYCLOVIR HYDROCHLORIDE 1 G/1
1000 TABLET, FILM COATED ORAL 3 TIMES DAILY
Qty: 90 TABLET | Refills: 11 | Status: SHIPPED | OUTPATIENT
Start: 2025-07-09 | End: 2025-07-09

## 2025-07-09 RX ORDER — DOXYCYCLINE 100 MG/1
100 CAPSULE ORAL EVERY 12 HOURS
Qty: 10 CAPSULE | Refills: 0 | Status: SHIPPED | OUTPATIENT
Start: 2025-07-10 | End: 2025-07-15

## 2025-07-09 RX ORDER — VALACYCLOVIR HYDROCHLORIDE 1 G/1
1000 TABLET, FILM COATED ORAL 3 TIMES DAILY
Qty: 21 TABLET | Refills: 0 | Status: SHIPPED | OUTPATIENT
Start: 2025-07-09 | End: 2025-07-16

## 2025-07-09 RX ORDER — BRIMONIDINE TARTRATE 2 MG/ML
1 SOLUTION/ DROPS OPHTHALMIC EVERY 12 HOURS
Qty: 10 ML | Refills: 0 | Status: SHIPPED | OUTPATIENT
Start: 2025-07-09 | End: 2025-08-08

## 2025-07-09 RX ORDER — POLYETHYLENE GLYCOL 3350 17 G/17G
17 POWDER, FOR SOLUTION ORAL DAILY PRN
Status: DISCONTINUED | OUTPATIENT
Start: 2025-07-09 | End: 2025-07-09 | Stop reason: HOSPADM

## 2025-07-09 RX ORDER — FERROUS SULFATE 325(65) MG
325 TABLET, DELAYED RELEASE (ENTERIC COATED) ORAL EVERY OTHER DAY
Qty: 45 TABLET | Refills: 0 | Status: SHIPPED | OUTPATIENT
Start: 2025-07-09

## 2025-07-09 RX ORDER — VALACYCLOVIR HYDROCHLORIDE 1 G/1
1000 TABLET, FILM COATED ORAL 3 TIMES DAILY
Status: DISCONTINUED | OUTPATIENT
Start: 2025-07-09 | End: 2025-07-09 | Stop reason: HOSPADM

## 2025-07-09 RX ADMIN — MUPIROCIN: 20 OINTMENT TOPICAL at 09:07

## 2025-07-09 RX ADMIN — INSULIN ASPART 2 UNITS: 100 INJECTION, SOLUTION INTRAVENOUS; SUBCUTANEOUS at 01:07

## 2025-07-09 RX ADMIN — CARVEDILOL 6.25 MG: 3.12 TABLET, FILM COATED ORAL at 09:07

## 2025-07-09 RX ADMIN — FERROUS SULFATE TAB 325 MG (65 MG ELEMENTAL FE) 1 EACH: 325 (65 FE) TAB at 09:07

## 2025-07-09 RX ADMIN — AMLODIPINE BESYLATE 5 MG: 5 TABLET ORAL at 09:07

## 2025-07-09 RX ADMIN — OXYCODONE HYDROCHLORIDE AND ACETAMINOPHEN 500 MG: 500 TABLET ORAL at 09:07

## 2025-07-09 RX ADMIN — POLYETHYLENE GLYCOL 3350 17 G: 17 POWDER, FOR SOLUTION ORAL at 04:07

## 2025-07-09 RX ADMIN — SPIRONOLACTONE 25 MG: 25 TABLET, FILM COATED ORAL at 09:07

## 2025-07-09 RX ADMIN — PIPERACILLIN SODIUM AND TAZOBACTAM SODIUM 4.5 G: 4; .5 INJECTION, POWDER, LYOPHILIZED, FOR SOLUTION INTRAVENOUS at 01:07

## 2025-07-09 RX ADMIN — ATORVASTATIN CALCIUM 20 MG: 20 TABLET, FILM COATED ORAL at 09:07

## 2025-07-09 RX ADMIN — LISINOPRIL 20 MG: 10 TABLET ORAL at 09:07

## 2025-07-09 RX ADMIN — VALACYCLOVIR 1000 MG: 1 TABLET, FILM COATED ORAL at 09:07

## 2025-07-09 RX ADMIN — RANOLAZINE 1000 MG: 500 TABLET, EXTENDED RELEASE ORAL at 09:07

## 2025-07-09 RX ADMIN — VANCOMYCIN HYDROCHLORIDE 750 MG: 750 INJECTION, POWDER, LYOPHILIZED, FOR SOLUTION INTRAVENOUS at 05:07

## 2025-07-09 RX ADMIN — PIPERACILLIN SODIUM AND TAZOBACTAM SODIUM 4.5 G: 4; .5 INJECTION, POWDER, LYOPHILIZED, FOR SOLUTION INTRAVENOUS at 11:07

## 2025-07-09 RX ADMIN — TIMOLOL MALEATE 1 DROP: 5 SOLUTION OPHTHALMIC at 03:07

## 2025-07-09 RX ADMIN — BRIMONIDINE TARTRATE 1 DROP: 2 SOLUTION OPHTHALMIC at 03:07

## 2025-07-09 RX ADMIN — Medication 325 MG: at 09:07

## 2025-07-09 RX ADMIN — ALBUTEROL SULFATE 1 PUFF: 90 AEROSOL, METERED RESPIRATORY (INHALATION) at 06:07

## 2025-07-09 NOTE — DISCHARGE SUMMARY
hospitals Internal Medicine Discharge Summary  [facility]    Admitting Physician: Ria Brown MD  Attending Physician: Sanjeev Garcia MD  Date of admit: 7/6/2025  Date of discharge: No discharge date for patient encounter.    Condition: Stable  Outcome: Patient tolerated treatment/procedure well without complication and is now ready for discharge.  Disposition: Home or Self Care    Hospital Course Summary and Problem List     Alex Morgan is a 84 y.o. male with a PMHx of CAD s/p PCI, BUDDY on CPAP, HTN, DM, HLD  who presents to [facility] for significant swelling in the left eye and a crusted lesion on the left forehead. The patient reports that the forehead lesion first appeared on Thursday and experienced minimal pain. The swelling around the left eye began on Friday morning and has progressively worsened, leading to puffiness and difficulty opening the left eye fully. Ophthalmology was consulted in the ED for concern of herpes zoster opthalmicus and patient was placed on erythromycin ointment for honey color crusting over zoster rash and valacyclovir. Patient was on IV abx, vanc and zosyn, while inpatient and will be on cefpodoxime, doxycycline, and valcyclovir outpatient. Patient had troponins which were elevated and uptrended, along with some dynamic T wave changes, raising suspicion for NSTEMI. Cardiology was consulted and outside records were obtained showing a relatively unremarkable hearth cath and echo was done here which was normal. Patient has not had any chest pain during the length of his admission. Patient will need an updated zoster vaccine once he follows up with his PCP, along with a pneumococcal vaccine.     Home or Self Care  Discharge Procedure Orders   Ambulatory referral/consult to Ophthalmology   Standing Status: Future   Referral Priority: Routine Referral Type: Consultation   Referral Reason: Specialty Services Required   Requested Specialty:  "Ophthalmology   Number of Visits Requested: 1       To address at [facility] Visit:  Significant medication changes: Norco 7.5 q6 PRN x 5 days, doxycline 100mg BID x5d, cefpodoxime 400mg x5d, valcyclovir 1g TID x7d  Results not resulted during admission: N/A  Recommended Labs: N/A  Recommended Imaging: N/A    Problem List  Perioribtal cellulitis  Herpes Zoster opthalmicus  Elevated troponins    Objective     Vital Signs:  Vitals  BP: 120/64  Temp: 98.2 °F (36.8 °C)  Temp Source: Oral  Pulse: 60  Resp: 18  SpO2: 100 %  Height: 5' 6" (167.6 cm)  Weight: 80.3 kg (177 lb)    Physical Exam  Physical Exam  Constitutional:       Appearance: Normal appearance.   HENT:      Head: Normocephalic.      Mouth/Throat:      Mouth: Mucous membranes are moist.   Eyes:      Comments: Redness over left brow ridge along with vesicular rash, interval decrease in swelling, can see conjuctiva   Cardiovascular:      Rate and Rhythm: Normal rate and regular rhythm.      Pulses: Normal pulses.   Pulmonary:      Effort: Pulmonary effort is normal. No respiratory distress.      Breath sounds: Normal breath sounds.   Abdominal:      General: Abdomen is flat. There is no distension.      Palpations: Abdomen is soft.      Tenderness: There is no abdominal tenderness.   Skin:     General: Skin is warm.      Capillary Refill: Capillary refill takes less than 2 seconds.   Neurological:      General: No focal deficit present.      Mental Status: He is alert and oriented to person, place, and time.         Discharge Medications        Medication List        PAUSE taking these medications      COMBIGAN 0.2-0.5 % Drop  Wait to take this until your doctor or other care provider tells you to start again.  Generic drug: brimonidine-timoloL     ferrous gluconate 324 MG tablet  Wait to take this until your doctor or other care provider tells you to start again.  Commonly known as: FERGON  Take 1 tablet (324 mg total) by mouth 2 (two) times a day. Take 2 hours " after eating            START taking these medications      brimonidine 0.2% 0.2 % Drop  Commonly known as: ALPHAGAN  Place 1 drop into both eyes every 12 (twelve) hours.     cefpodoxime 200 MG tablet  Commonly known as: VANTIN  Take 2 tablets (400 mg total) by mouth 2 (two) times daily. for 5 days  Start taking on: July 10, 2025     doxycycline 100 MG Cap  Commonly known as: VIBRAMYCIN  Take 1 capsule (100 mg total) by mouth every 12 (twelve) hours. for 5 days  Start taking on: July 10, 2025     erythromycin ophthalmic ointment  Commonly known as: ROMYCIN  Place into the left eye every evening. for 6 days     ferrous sulfate 325 (65 FE) MG EC tablet  Take 1 tablet (325 mg total) by mouth every other day.     HYDROcodone-acetaminophen 7.5-325 mg per tablet  Commonly known as: NORCO  Take 1 tablet by mouth every 6 (six) hours as needed for Pain.  Start taking on: July 10, 2025     valACYclovir 1000 MG tablet  Commonly known as: VALTREX  Take 1 tablet (1,000 mg total) by mouth 3 (three) times daily.            CONTINUE taking these medications      albuterol 90 mcg/actuation inhaler  Commonly known as: PROVENTIL/VENTOLIN HFA     amLODIPine 5 MG tablet  Commonly known as: NORVASC     ascorbic acid (vitamin C) 500 MG tablet  Commonly known as: VITAMIN C  Take 1 tablet (500 mg) by mouth twice daily with oral iron.     carvediloL 6.25 MG tablet  Commonly known as: COREG     glipiZIDE 10 MG tablet  Commonly known as: GLUCOTROL     isosorbide mononitrate 60 MG 24 hr tablet  Commonly known as: IMDUR     JARDIANCE 10 mg tablet  Generic drug: empagliflozin     levocetirizine 5 MG tablet  Commonly known as: XYZAL     lisinopriL 20 MG tablet  Commonly known as: PRINIVIL,ZESTRIL     magnesium oxide 400 mg (241.3 mg magnesium) tablet  Commonly known as: MAG-OX     metFORMIN 500 MG tablet  Commonly known as: GLUCOPHAGE     ranolazine 1,000 mg Tb12  Commonly known as: RANEXA     rosuvastatin 20 MG tablet  Commonly known as:  CRESTOR     spironolactone 25 MG tablet  Commonly known as: ALDACTONE     TRELEGY ELLIPTA 200-62.5-25 mcg inhaler  Generic drug: fluticasone-umeclidin-vilanter               Where to Get Your Medications        These medications were sent to [facility] [place], [place] - [address]  [address] [facility] [address]      Phone: [phone]   brimonidine 0.2% 0.2 % Drop  cefpodoxime 200 MG tablet  doxycycline 100 MG Cap  erythromycin ophthalmic ointment  ferrous sulfate 325 (65 FE) MG EC tablet  HYDROcodone-acetaminophen 7.5-325 mg per tablet  valACYclovir 1000 MG tablet         Outpatient Follow Up      Follow-up Information       No, Primary Doctor. Call in 1 week(s).    Why: Please follow up with your primary care provider in week.                           At this time, Alex Morgan is determined to have maximized benefits of IP hospitalization. he is discharged in stable condition with outpatient f/u recommendations and instructions. All questions were answered, and patient verbalized agreement with the POC. They were given return precautions prior to discharge including symptoms that should prompt return to ED or to call PCP.     Total time spent at discharge: >30 minutes    Brady Olmstead, PGY-1  [facility]

## 2025-07-09 NOTE — PROGRESS NOTES
Pharmacokinetic Assessment Follow Up: IV Vancomycin    Vancomycin serum concentration assessment(s):    The trough level was drawn correctly and can be used to guide therapy at this time. The measurement is below the desired definitive target range of 10 to 20 mcg/mL.    Vancomycin Regimen Plan:    Change regimen to Vancomycin 750 mg IV every 12 hours with next serum trough concentration measured at 0430 prior to 0530 dose on 07/10    Drug levels (last 3 results):  Recent Labs   Lab Result Units 07/09/25  0320   Vancomycin Trough ug/ml 9.1*       Pharmacy will continue to follow and monitor vancomycin.    Please contact pharmacy at extension [phone] for questions regarding this assessment.    Thank you for the consult,   Imelda Nichols       Patient brief summary:  Alex Morgan is a 84 y.o. male initiated on antimicrobial therapy with IV Vancomycin for treatment of skin & soft tissue infection    The patient's current regimen is vancomycin 750mg q12h    Drug Allergies:   Review of patient's allergies indicates:  No Known Allergies    Actual Body Weight:   80.3kg    Renal Function:   Estimated Creatinine Clearance: 52.6 mL/min (based on SCr of 1.04 mg/dL).,     Dialysis Method (if applicable):  N/A    CBC (last 72 hours):  Recent Labs   Lab Result Units 07/06/25  2334 07/08/25  0334   WBC x10(3)/mcL 3.36* 4.05*   Hgb g/dL 12.2* 10.7*   Hemoglobin A1c % 5.9  --    Hct % 38.1* 32.4*   Platelet x10(3)/mcL 185 155   Mono % % 17.0  --    Monocytes % %  --  16*   Eos % % 1.2  --    Eosinophils % %  --  2   Basophil % % 0.6  --    Basophils % %  --  2       Metabolic Panel (last 72 hours):  Recent Labs   Lab Result Units 07/06/25  2334 07/07/25  0122 07/08/25  0334 07/09/25  0320   Sodium mmol/L 135*  --  133* 134*   Potassium mmol/L 4.4  --  4.0 4.5   Chloride mmol/L 108*  --  108* 108*   CO2 mmol/L 18*  --  18* 20*   Glucose mg/dL 157*  --  169* 179*   Blood Urea Nitrogen mg/dL 15.0  --  20.7 20.5   Creatinine mg/dL  1.11  --  1.18 1.04   Albumin g/dL 3.8  --  3.3* 3.0*   Bilirubin Total mg/dL 0.3  --  0.3 0.3   ALP unit/L 106  --  81 69   AST unit/L 14  --  22 23   ALT unit/L 11  --  12 14   Magnesium Level mg/dL  --  2.00 2.00 1.90   Phosphorus Level mg/dL  --  1.1* 1.1* 1.3*       Vancomycin Administrations:  vancomycin given in the last 96 hours                     vancomycin (VANCOCIN) 1,000 mg in 0.9% NaCl 250 mL IVPB (admixture device) (mg) 1,000 mg New Bag 07/08/25 0431    vancomycin 1,500 mg in 0.9% NaCl 250 mL IVPB (admixture device) (mg) 1,500 mg New Bag 07/07/25 0404                    Microbiologic Results:  Microbiology Results (last 7 days)       Procedure Component Value Units Date/Time    Blood Culture [[id number]] Collected: 07/08/25 0732    Order Status: Resulted Specimen: Blood from Hand, Left Updated: 07/08/25 0758    Blood Culture [[id number]] Collected: 07/08/25 0763    Order Status: Resulted Specimen: Blood from Antecubital, Left Updated: 07/08/25 0758

## 2025-07-09 NOTE — TELEPHONE ENCOUNTER
----- Message from Eveline sent at 7/9/2025  3:41 PM CDT -----  You are welcome.  ----- Message -----  From: Enma Varela MD  Sent: 7/9/2025   3:37 PM CDT  To: Pattie Willingham LPN; Stephenie Mosquera #    Thank you for the update, please make a note of this in patient's chart for records  ----- Message -----  From: Elvira Jensen  Sent: 7/9/2025   2:48 PM CDT  To: Enma Varela MD; Pattie Willingham LPN; #    Dr. Varela,     Please see message below.  ----- Message -----  From: Eveline Rivas  Sent: 7/9/2025   2:32 PM CDT  To: Pattie Willingham LPN; Stephenie PHILLIP#    Patient no showed 3 times and I am unable to reach patient to reschedule. Last injectafer was give on 6/17/25. Please let Dr. Varela know.    Thanks

## 2025-07-09 NOTE — PROGRESS NOTES
[facility]  PROGRESS NOTE    Patient's Name: Alex Morgan  : 1940  MRN: [id number]    Admission Date: 2025  Length of Stay: 2  Attending Physician: Sanjeev Garcia MD  Resident: Dr. Gregory  Intern: Dr. Olmstead    SUBJECTIVE     Chief Complaint   Patient presents with    Eye Problem     Pt arrives via [facility] transfer from [facility] for shingles in the left eye      History of Present Illness:  Alex Morgan is a 84 y.o. male with a PMHx of CAD s/p PCI, BUDDY on CPAP, HTN, DM, HLD  who presents to [facility] for significant swelling in the left eye and a crusted lesion on the left forehead.Patient was seen at another medical center before coming to [facility]. The patient reports that the forehead lesion first appeared on Thursday and experienced minimal pain. The swelling around the left eye began on Friday morning and has progressively worsened, leading to puffiness and difficulty opening the left eye fully. Patient has watery discharge out of the left eye. Patient denies any eye pain, vision issues, and any recent insect bites or trauma to the area. In addition to these symptoms, the patient reports having a sinus infection a few days prior to the onset of current symptoms, characterized by nasal congestion. The patient reports taking Claritin for his sinus.CT scan without contrast was performed, preliminary results showed pronounced swelling with subcutaneous fat stranding of the preseptal soft tissues of the left orbit. Ophthalmology was consulted by the ED, and they examined the patient, recommending the application of erythromycin ointment 6X daily and valacyclovir 1000mg TID for at least 7 days. Patient is not up to date with his vaccinations. Patient denies fever, chills, malaise, and any history of similar episodes in the past. Patient was admitted 2 weeks ago at a different hospital due to chest pain.     Hospital Course/Significant Events:  2025: Admitted to  [facility].    Interval History:  VSS and NAEON. Patient has had decreasing swelling over the left forehead and eye and feels more comfortable than day prior. Still is unable to open eyes fully. Patient does not complain of any chest pain, shortness of breath, weakness.     Review of Systems:  A 12-pt ROS was conducted and was negative unless stated above.    OBJECTIVE     VITAL SIGNS: 24 HR MIN & MAX Most Recent Vitals   Temp  Min: 98 °F (36.7 °C)  Max: 98.6 °F (37 °C)  98.2 °F (36.8 °C)   BP  Min: 102/57  Max: 133/72  120/64    Pulse  Min: 54  Max: 68  60   Resp  Min: 16  Max: 18  18    SpO2  Min: 95 %  Max: 100 %  100 %    Body mass index is 28.57 kg/m².    Intake/Output:  I/O last 3 completed shifts:  In: 1457.4 [P.O.:960; IV Piggyback:497.4]  Out: 1600 [Urine:1600]  Physical Exam  Constitutional:       Appearance: Normal appearance.   HENT:      Head: Normocephalic.   Eyes:      General:         Left eye: Discharge present.     Comments: Left eye crusted with upper lid edema. Honey color crusting over left forehead and brow. Interval decrease in swelling over brow ridge and forehead.   Cardiovascular:      Rate and Rhythm: Normal rate and regular rhythm.      Pulses: Normal pulses.      Heart sounds: No murmur heard.  Pulmonary:      Effort: No respiratory distress.      Breath sounds: No wheezing.   Abdominal:      General: Abdomen is flat.      Palpations: Abdomen is soft.      Tenderness: There is no abdominal tenderness.   Skin:     General: Skin is warm.      Capillary Refill: Capillary refill takes less than 2 seconds.   Neurological:      General: No focal deficit present.      Mental Status: He is alert and oriented to person, place, and time.         Current Medications:  Scheduled:   albuterol  1 puff Inhalation BID    amLODIPine  5 mg Oral Daily    ascorbic acid (vitamin C)  500 mg Oral Daily    aspirin  325 mg Oral Daily    atorvastatin  20 mg Oral Daily    brimonidine 0.2%  1 drop Both Eyes Q12H     carvediloL  6.25 mg Oral BID    enoxparin  40 mg Subcutaneous Q24H (prophylaxis, 1700)    ferrous sulfate  1 tablet Oral Every other day    isosorbide mononitrate  120 mg Oral Nightly    lisinopriL  20 mg Oral Daily    mupirocin   Nasal BID    piperacillin-tazobactam (Zosyn) IV (PEDS and ADULTS) (extended infusion is not appropriate)  4.5 g Intravenous Q8H    ranolazine  1,000 mg Oral BID    spironolactone  25 mg Oral Daily    timolol maleate 0.5%  1 drop Both Eyes BID    valACYclovir  1,000 mg Oral TID    vancomycin (VANCOCIN) IV (PEDS and ADULTS)  750 mg Intravenous Q12H      Infusions:    PRNs:    Current Facility-Administered Medications:     0.9% NaCl, , Intravenous, PRN    dextrose 50%, 12.5 g, Intravenous, PRN    dextrose 50%, 25 g, Intravenous, PRN    glucagon (human recombinant), 1 mg, Intramuscular, PRN    glucose, 16 g, Oral, PRN    glucose, 24 g, Oral, PRN    HYDROcodone-acetaminophen, 1 tablet, Oral, Q4H PRN    ibuprofen, 400 mg, Oral, Q6H PRN    insulin aspart U-100, 0-5 Units, Subcutaneous, QID (AC + HS) PRN    melatonin, 6 mg, Oral, Nightly PRN    naloxone, 0.02 mg, Intravenous, PRN    polyethylene glycol, 17 g, Oral, Daily PRN    sodium chloride 0.9%, 10 mL, Intravenous, Q12H PRN    sodium phosphate 45 mmol in D5W 250 mL IVPB, 45 mmol, Intravenous, Daily PRN    Pharmacy to dose Vancomycin consult, , , Once **AND** vancomycin - pharmacy to dose, , Intravenous, pharmacy to manage frequency  Labs:  CBC:  Recent Labs   Lab 07/06/25 2334 07/08/25 0334 07/09/25  0320   WBC 3.36* 4.05* 3.93*   HGB 12.2* 10.7* 9.5*   HCT 38.1* 32.4* 29.3*    155 150   MCV 92.5 90.0 89.9   RDW 15.3 14.8 14.9     BMP/CMP:  Recent Labs   Lab 07/06/25  2334 07/08/25  0334 07/09/25  0320   * 133* 134*   K 4.4 4.0 4.5   * 108* 108*   CO2 18* 18* 20*   BUN 15.0 20.7 20.5   CREATININE 1.11 1.18 1.04   EGFRNORACEVR >60 >60 >60     RFTs/LFTs:  Recent Labs   Lab 07/06/25  2334 07/08/25  0334 07/09/25  0320    CALCIUM 8.2* 7.9* 7.4*   ALBUMIN 3.8 3.3* 3.0*   AST 14 22 23   ALT 11 12 14   ALKPHOS 106 81 69   BILITOT 0.3 0.3 0.3     Recent Labs   Lab 07/07/25  0122 07/08/25  0334 07/09/25  0320   MG 2.00 2.00 1.90   PHOS 1.1* 1.1* 1.3*     Cardiac Panel:  Recent Labs   Lab 07/07/25  0129 07/07/25  0910 07/07/25  1509 07/07/25  2112 07/08/25  0940   TROPONINI 0.090* 0.117* 0.137* 0.151* 0.122*     Interval Imaging:  X-Ray Chest PA And Lateral  Narrative: EXAMINATION:  XR CHEST PA AND LATERAL    CLINICAL HISTORY:  , Shortness of breath.    FINDINGS:  No alveolar consolidation, effusion, or pneumothorax is seen.   The thoracic aorta is normal  cardiac silhouette, central pulmonary vessels and mediastinum are normal in size and are grossly unremarkable.   visualized osseous structures are grossly unremarkable.  Impression: No acute chest disease is identified.    Electronically signed by: Solo Trammell  Date:    07/07/2025  Time:    06:41  CT Orbits Sella Post Fossa Without Cont  Narrative: EXAMINATION:  CT ORBITS SELLA POST FOSSA WITHOUT CONT    CLINICAL HISTORY:  Orbital cellulitis suspected;    TECHNIQUE:  Axial scans were obtained through the orbits.    Coronal and sagittal reconstructions obtained from the axial data.    Automatic exposure control was utilized to limit radiation dose.    Contrast: None    Radiation Dose:    Total DLP: 221 mGy*cm    COMPARISON:  None    FINDINGS:  There is left periorbital soft tissue swelling.  The globes are normal in contour.  There has been prior bilateral cataract surgery.  The optic nerves are intact.  The extraocular muscles are symmetric.  There is no postseptal abnormality of the orbits.  There is no acute bony abnormality.  The paranasal sinuses are clear.  Impression: Left periorbital soft tissue swelling    No significant change from the [facility] interpretation.    Electronically signed by: Sue Mack  Date:    07/07/2025  Time:    06:36    "  Microbiology:  Microbiology Results (last 7 days)       Procedure Component Value Units Date/Time    Blood Culture [[id number]] Collected: 07/08/25 0732    Order Status: Resulted Specimen: Blood from Hand, Left Updated: 07/08/25 0758    Blood Culture [[id number]] Collected: 07/08/25 0723    Order Status: Resulted Specimen: Blood from Antecubital, Left Updated: 07/08/25 0756          Antibiotics:  Antibiotics (From admission, onward)      Start     Stop Route Frequency Ordered    07/09/25 0530  vancomycin 750 mg in 0.9% NaCl 250 mL IVPB (admixture device)         -- IV Every 12 hours (non-standard times) 07/09/25 0510    07/08/25 0730  vancomycin - pharmacy to dose  (vancomycin IVPB (PEDS and ADULTS))        Placed in "And" Linked Group    -- IV pharmacy to manage frequency 07/08/25 0631    07/07/25 1045  piperacillin-tazobactam (ZOSYN) 4.5 g in D5W 100 mL IVPB (MB+)         -- IV Every 8 hours (non-standard times) 07/07/25 0933    07/07/25 0900  mupirocin 2 % ointment         07/12/25 0859 Nasl 2 times daily 07/07/25 0126             ASSESSMENT AND PLAN   Periorbital cellulitis  - Preliminary results of CT scan without contrast shows  pronounced swelling with subcutaneous fat stranding of the preseptal soft tissues of the left orbit extending to the left zygomaticomaxillary area.This is consistent with periorbital cellulitis  - Apply Erythromycin ointment to affected eye 6X daily   - Patient had fevers to 101.7 on 07/07, will continue systemic abx at this time with vanc and zosyn; no cultures obtained on admission to follow  - Continue to monitor for any signs of worsening infection or abscess formation  - Ophthalmology feels patient is improved and ready for discharge from their standpoint  - Will plan for cefpodoxime 400mg BID and doxycyline 100mg BID along with valcyclovir 1g TID for OP management     Shingles  - Crusty lesion present on left V1 distribution   - On Valacyclovir 1000mg oral every 12 hours   - " Educate patient on importance of staying up to date with vaccination   - Recommend pneumococcal and zoster vaccines during follow-up visits   - Will need close follow up with ophthalmology and PCP    CAD  NSTEMI  - Patient has a history of hypertension and CAD  - Admitted 2 weeks ago due to chest pain  - Troponin 0.090 on admission, uptrending to 0.15, patient currently not experiencing chest pain  - EKG showed T wave inversions on anterior leads, repeat EKG showed reversal of changes    - Consulted cards for evaluation of dynamic EKG changes and uptrending troponin  - Cards recommend echo, have low suspicion for NSTEMI at this time, would want stress test before leaving, and if not OP  - Echo ordered, pending at this time  - continue aspirin and lovenox switched to ppx dose  - Patient on telemetry      DVT Prophylaxis: Lovenox  GI Prophylaxis: n/a  Abx: vanc and zosyn  Access: IV on right  Fluids: received in ED  Diet: Diet Heart Healthy Fluid - 1500mL; Isolation Tray - Styrofoam    Code Status: Full Code    Disposition: 84 y.o. male admitted for eye cellulitis and developed EKG changes. Dispo pending workup for ACS and clinical improvement for ophthalmic zoster       Case was discussed and seen with Dr. Garcia. Please appreciate attending's attestation to follow.    Brady Olmstead MD  PGY-1 Resident  [facility] Team 1  07/09/2025

## 2025-07-09 NOTE — PROGRESS NOTES
Pharmacist Renal Dose Adjustment Note    Alex Morgan is a 84 y.o. male being treated with the medication valacyclovir    Patient Data:    Vital Signs (Most Recent):  Temp: 98.4 °F (36.9 °C) (07/09/25 0323)  Pulse: (!) 58 (07/09/25 0641)  Resp: 16 (07/09/25 0641)  BP: 133/72 (07/09/25 0323)  SpO2: 97 % (07/09/25 0641) Vital Signs (72h Range):  Temp:  [98 °F (36.7 °C)-101.7 °F (38.7 °C)]   Pulse:  [54-78]   Resp:  [16-18]   BP: (100-157)/(57-82)   SpO2:  [94 %-100 %]      Recent Labs   Lab 07/06/25  2334 07/08/25  0334 07/09/25  0320   CREATININE 1.11 1.18 1.04     Serum creatinine: 1.04 mg/dL 07/09/25 0320  Estimated creatinine clearance: 52.6 mL/min    Medication:valacyclovir dose: 1,000 mg frequency twice daily will be changed to medication:valacyclovir dose:1,000 mg frequency:3 times daily for CrCl > 50 mL/min    Pharmacist's Name: Bucky Ruffin  Pharmacist's Extension: [phone]

## 2025-07-09 NOTE — PLAN OF CARE
07/09/25 1420   Medicare Message   Important Message from Medicare regarding Discharge Appeal Rights Given to patient/caregiver;Explained to patient/caregiver;Signed/date by patient/caregiver   Date IMM was signed 07/09/25   Time IMM was signed 1428

## 2025-07-09 NOTE — PROGRESS NOTES
Inpatient Nutrition Assessment    Admit Date: 7/6/2025   Total duration of encounter: 3 days   Patient Age: 84 y.o.    Nutrition Recommendation/Prescription     Will change diet to consistent carb/heart healthy diet/ 1500 ml fluid  Will order vanilla boost glucose control bid; Boost Glucose Control (provides 190 kcal, 16 g protein per serving)   Pt education on diet/ complete  Electrolyte repletion as needed  Biweekly wt  Will monitor nutrition status     Communication of Recommendations: reviewed with nurse and reviewed with patient    Nutrition Assessment     Malnutrition Assessment/Nutrition-Focused Physical Exam       Malnutrition Level: other (see comments) (Does not meet criteria) (07/09/25 1307)  Energy Intake (Malnutrition): other (see comments) (Does not meet criteria) (07/09/25 1307)  Weight Loss (Malnutrition): other (see comments) (Does not meet criteria) (07/09/25 1307)         Clavicle Bone Region (Muscle Loss): well nourished      Fluid Accumulation (Malnutrition): other (see comments) (Not present) (07/09/25 1307)     Hand  Strength, Right (Malnutrition): Unable to assess (07/09/25 1307)  A minimum of two characteristics is recommended for diagnosis of either severe or non-severe malnutrition.    Chart Review    Reason Seen: length of stay    Malnutrition Screening Tool Results   Have you recently lost weight without trying?: No  Have you been eating poorly because of a decreased appetite?: No   MST Score: 0   Diagnosis:  Periorbital cellulitis, shingles, CAD, NSTEMI    Relevant Medical History: CAD< BUDDY, CPAP, HTN, DM< HLD    Scheduled Medications:  albuterol, 1 puff, BID  amLODIPine, 5 mg, Daily  ascorbic acid (vitamin C), 500 mg, Daily  aspirin, 325 mg, Daily  atorvastatin, 20 mg, Daily  brimonidine 0.2%, 1 drop, Q12H  carvediloL, 6.25 mg, BID  enoxparin, 40 mg, Q24H (prophylaxis, 1700)  ferrous sulfate, 1 tablet, Every other day  isosorbide mononitrate, 120 mg, Nightly  lisinopriL, 20 mg,  Daily  mupirocin, , BID  piperacillin-tazobactam (Zosyn) IV (PEDS and ADULTS) (extended infusion is not appropriate), 4.5 g, Q8H  ranolazine, 1,000 mg, BID  spironolactone, 25 mg, Daily  timolol maleate 0.5%, 1 drop, BID  valACYclovir, 1,000 mg, TID  vancomycin (VANCOCIN) IV (PEDS and ADULTS), 750 mg, Q12H    Continuous Infusions:   PRN Medications:  0.9% NaCl, , PRN  dextrose 50%, 12.5 g, PRN  dextrose 50%, 25 g, PRN  glucagon (human recombinant), 1 mg, PRN  glucose, 16 g, PRN  glucose, 24 g, PRN  HYDROcodone-acetaminophen, 1 tablet, Q4H PRN  ibuprofen, 400 mg, Q6H PRN  insulin aspart U-100, 0-5 Units, QID (AC + HS) PRN  melatonin, 6 mg, Nightly PRN  naloxone, 0.02 mg, PRN  polyethylene glycol, 17 g, Daily PRN  sodium chloride 0.9%, 10 mL, Q12H PRN  sodium phosphate 45 mmol in D5W 250 mL IVPB, 45 mmol, Daily PRN  vancomycin - pharmacy to dose, , pharmacy to manage frequency    Calorie Containing IV Medications: no significant kcals from medications at this time    Recent Labs   Lab 07/06/25  2334 07/07/25  0122 07/08/25  0334 07/09/25  0320   *  --  133* 134*   K 4.4  --  4.0 4.5   CALCIUM 8.2*  --  7.9* 7.4*   PHOS  --  1.1* 1.1* 1.3*   MG  --  2.00 2.00 1.90   *  --  108* 108*   CO2 18*  --  18* 20*   BUN 15.0  --  20.7 20.5   CREATININE 1.11  --  1.18 1.04   EGFRNORACEVR >60  --  >60 >60   *  --  169* 179*   BILITOT 0.3  --  0.3 0.3   ALKPHOS 106  --  81 69   ALT 11  --  12 14   AST 14  --  22 23   ALBUMIN 3.8  --  3.3* 3.0*   HGBA1C 5.9  --   --   --    WBC 3.36*  --  4.05* 3.93*   HGB 12.2*  --  10.7* 9.5*   HCT 38.1*  --  32.4* 29.3*     Nutrition Orders:  Diet Heart Healthy Fluid - 1500mL; Isolation Tray - Styrofoam      Appetite/Oral Intake: fair/50-75% of meals  Factors Affecting Nutritional Intake: none identified  Social Needs Impacting Access to Food: none identified  Food/Anabaptist/Cultural Preferences: none reported  Food Allergies: none reported  Last Bowel Movement:  "25  Wound(s):  orbital cellulitis /shingles     Comments    () Pt reported he is tolerating oral diet; fair appetite; pt willing to drink ONS; will order. No significant wt change. Abnormal labs noted: Gluc (H)--DM; P (L)--suggest electrolyte repletion.H/H(L)--on fe supplement.     Anthropometrics    Height: 5' 6" (167.6 cm), Height Method: Stated  Last Weight: 80.3 kg (177 lb) (25 0948), Weight Method: Standard Scale  BMI (Calculated): 28.6  BMI Classification: overweight (BMI 25-29.9)        Ideal Body Weight (IBW), Male: 142 lb     % Ideal Body Weight, Male (lb): 124.65 %                 Usual Body Weight (UBW), k.4 kg  % Usual Body Weight: 97.64     Usual Weight Provided By: patient and EMR weight history    Wt Readings from Last 5 Encounters:   25 80.3 kg (177 lb)   25 82.4 kg (181 lb 11.2 oz)   25 83.1 kg (183 lb 1.6 oz)   25 82.5 kg (181 lb 14.4 oz)   24 84.1 kg (185 lb 8 oz)     Weight Change(s) Since Admission:  #  Wt Readings from Last 1 Encounters:   25 0948 80.3 kg (177 lb)   25 0421 80.3 kg (177 lb 0.5 oz)   25 042 80.3 kg (177 lb 0.5 oz)   25 70.3 kg (155 lb)   Admit Weight: 70.3 kg (155 lb) (25 230), Weight Method: Stated    Estimated Needs    Weight Used For Calorie Calculations: 80.3 kg (177 lb 0.5 oz)  Energy Calorie Requirements (kcal): 2007 kcal/d; 25 kcal/kg  Energy Need Method: Kcal/kg  Weight Used For Protein Calculations: 80.3 kg (177 lb 0.5 oz)  Protein Requirements: 96 gm protein/d; 1.2 gm/kg  Fluid Requirements (mL): 2007 ml/d; 1ml/kcal  CHO Requirement: 225 gm CHO/d     Enteral Nutrition     Patient not receiving enteral nutrition at this time.    Parenteral Nutrition     Patient not receiving parenteral nutrition support at this time.    Evaluation of Received Nutrient Intake    Calories: not meeting estimated needs; fair po intake  Protein: not meeting estimated needs    Patient Education "     Education Provided: diabetic diet  Teaching Method: explanation and printed materials  Comprehension: verbalizes understanding  Barriers to Learning: none evident  Expected Compliance: fair  Comments: All questions were answered and dietitian's contact information was provided.     Nutrition Diagnosis     PES: Altered nutrition related laboratory values related to chronic illness as evidenced by Gluc (H); H/H(L). (new)     PES:            Nutrition Interventions     Intervention(s): modified composition of meals/snacks, commercial beverage, multivitamin/mineral supplement therapy, purpose of nutrition education, and collaboration with other providers  Intervention(s):      Goal: Meet greater than 80% of nutritional needs by follow-up. (new)  Goal: Maintain weight throughout hospitalization. (new)    Nutrition Goals & Monitoring     Dietitian will monitor: food and beverage intake, weight, and food/nutrition knowledge skill  Discharge planning: continue consistent carb diet with boost glucose control  oral supplements  Nutrition Risk/Follow-Up: dietitian will follow-up one time per week   Please consult if re-assessment needed sooner.

## 2025-07-12 ENCOUNTER — TELEPHONE (OUTPATIENT)
Dept: OPHTHALMOLOGY | Facility: CLINIC | Age: 85
End: 2025-07-12
Payer: MEDICARE

## 2025-07-12 NOTE — TELEPHONE ENCOUNTER
Left VM for patient at listed numbers for patient to call the eye clinic at our listed number to set up an appointment.

## 2025-07-13 LAB
BACTERIA BLD CULT: NORMAL
BACTERIA BLD CULT: NORMAL